# Patient Record
Sex: MALE | Race: WHITE | NOT HISPANIC OR LATINO | Employment: OTHER | URBAN - METROPOLITAN AREA
[De-identification: names, ages, dates, MRNs, and addresses within clinical notes are randomized per-mention and may not be internally consistent; named-entity substitution may affect disease eponyms.]

---

## 2018-01-01 ENCOUNTER — APPOINTMENT (OUTPATIENT)
Dept: ULTRASOUND IMAGING | Facility: HOSPITAL | Age: 83
End: 2018-01-01
Attending: HOSPITALIST

## 2018-01-01 ENCOUNTER — APPOINTMENT (OUTPATIENT)
Dept: GENERAL RADIOLOGY | Facility: HOSPITAL | Age: 83
End: 2018-01-01

## 2018-01-01 ENCOUNTER — APPOINTMENT (OUTPATIENT)
Dept: CARDIOLOGY | Facility: HOSPITAL | Age: 83
End: 2018-01-01

## 2018-01-01 ENCOUNTER — HOSPITAL ENCOUNTER (INPATIENT)
Facility: HOSPITAL | Age: 83
LOS: 9 days | Discharge: NURSING, REHAB OR LONG-TERM CARE | End: 2018-05-10
Attending: EMERGENCY MEDICINE | Admitting: FAMILY MEDICINE

## 2018-01-01 ENCOUNTER — HOSPITAL ENCOUNTER (INPATIENT)
Facility: HOSPITAL | Age: 83
LOS: 2 days | Discharge: HOSPICE | End: 2018-05-28
Attending: EMERGENCY MEDICINE | Admitting: INTERNAL MEDICINE

## 2018-01-01 ENCOUNTER — ANCILLARY PROCEDURE (OUTPATIENT)
Dept: SPEECH THERAPY | Facility: HOSPITAL | Age: 83
End: 2018-01-01
Attending: INTERNAL MEDICINE

## 2018-01-01 ENCOUNTER — HOSPITAL ENCOUNTER (INPATIENT)
Facility: HOSPITAL | Age: 83
LOS: 12 days | End: 2018-06-09
Attending: INTERNAL MEDICINE | Admitting: INTERNAL MEDICINE

## 2018-01-01 ENCOUNTER — APPOINTMENT (OUTPATIENT)
Dept: CT IMAGING | Facility: HOSPITAL | Age: 83
End: 2018-01-01

## 2018-01-01 VITALS
BODY MASS INDEX: 20.54 KG/M2 | DIASTOLIC BLOOD PRESSURE: 67 MMHG | SYSTOLIC BLOOD PRESSURE: 127 MMHG | RESPIRATION RATE: 16 BRPM | HEART RATE: 74 BPM | TEMPERATURE: 98.6 F | WEIGHT: 160.05 LBS | OXYGEN SATURATION: 96 % | HEIGHT: 74 IN

## 2018-01-01 VITALS
DIASTOLIC BLOOD PRESSURE: 51 MMHG | WEIGHT: 154.2 LBS | BODY MASS INDEX: 19.79 KG/M2 | OXYGEN SATURATION: 93 % | RESPIRATION RATE: 16 BRPM | SYSTOLIC BLOOD PRESSURE: 126 MMHG | TEMPERATURE: 97.8 F | HEART RATE: 43 BPM | HEIGHT: 74 IN

## 2018-01-01 VITALS
HEIGHT: 74 IN | SYSTOLIC BLOOD PRESSURE: 108 MMHG | TEMPERATURE: 99.6 F | WEIGHT: 160 LBS | HEART RATE: 78 BPM | DIASTOLIC BLOOD PRESSURE: 62 MMHG | RESPIRATION RATE: 20 BRPM | BODY MASS INDEX: 20.53 KG/M2 | OXYGEN SATURATION: 93 %

## 2018-01-01 DIAGNOSIS — Z78.9 IMPAIRED MOBILITY AND ADLS: ICD-10-CM

## 2018-01-01 DIAGNOSIS — R13.12 OROPHARYNGEAL DYSPHAGIA: ICD-10-CM

## 2018-01-01 DIAGNOSIS — E86.0 DEHYDRATION: Primary | ICD-10-CM

## 2018-01-01 DIAGNOSIS — Z78.9 IMPAIRED MOBILITY AND ACTIVITIES OF DAILY LIVING: ICD-10-CM

## 2018-01-01 DIAGNOSIS — J96.01 ACUTE RESPIRATORY FAILURE WITH HYPOXIA (HCC): ICD-10-CM

## 2018-01-01 DIAGNOSIS — R79.89 ELEVATED SERUM CREATININE: ICD-10-CM

## 2018-01-01 DIAGNOSIS — Z74.09 IMPAIRED MOBILITY AND ACTIVITIES OF DAILY LIVING: ICD-10-CM

## 2018-01-01 DIAGNOSIS — T07.XXXA MULTIPLE CONTUSIONS: ICD-10-CM

## 2018-01-01 DIAGNOSIS — R53.1 GENERALIZED WEAKNESS: ICD-10-CM

## 2018-01-01 DIAGNOSIS — Z74.09 IMPAIRED MOBILITY AND ADLS: ICD-10-CM

## 2018-01-01 DIAGNOSIS — Z74.09 IMPAIRED FUNCTIONAL MOBILITY, BALANCE, GAIT, AND ENDURANCE: ICD-10-CM

## 2018-01-01 DIAGNOSIS — J18.9 PNEUMONIA OF LEFT LOWER LOBE DUE TO INFECTIOUS ORGANISM: Primary | ICD-10-CM

## 2018-01-01 LAB
ALBUMIN SERPL-MCNC: 3 G/DL (ref 3.2–4.8)
ALBUMIN SERPL-MCNC: 3 G/DL (ref 3.2–4.8)
ALBUMIN SERPL-MCNC: 3.5 G/DL (ref 3.2–4.8)
ALBUMIN SERPL-MCNC: 3.9 G/DL (ref 3.2–4.8)
ALBUMIN/GLOB SERPL: 1.1 G/DL (ref 1.5–2.5)
ALBUMIN/GLOB SERPL: 1.2 G/DL (ref 1.5–2.5)
ALP SERPL-CCNC: 104 U/L (ref 25–100)
ALP SERPL-CCNC: 111 U/L (ref 25–100)
ALP SERPL-CCNC: 120 U/L (ref 25–100)
ALP SERPL-CCNC: 171 U/L (ref 25–100)
ALT SERPL W P-5'-P-CCNC: 15 U/L (ref 7–40)
ALT SERPL W P-5'-P-CCNC: 22 U/L (ref 7–40)
ALT SERPL W P-5'-P-CCNC: 23 U/L (ref 7–40)
ALT SERPL W P-5'-P-CCNC: 38 U/L (ref 7–40)
ANION GAP SERPL CALCULATED.3IONS-SCNC: 11 MMOL/L (ref 3–11)
ANION GAP SERPL CALCULATED.3IONS-SCNC: 11 MMOL/L (ref 3–11)
ANION GAP SERPL CALCULATED.3IONS-SCNC: 5 MMOL/L (ref 3–11)
ANION GAP SERPL CALCULATED.3IONS-SCNC: 6 MMOL/L (ref 3–11)
ANION GAP SERPL CALCULATED.3IONS-SCNC: 7 MMOL/L (ref 3–11)
ANION GAP SERPL CALCULATED.3IONS-SCNC: 7 MMOL/L (ref 3–11)
ANION GAP SERPL CALCULATED.3IONS-SCNC: 8 MMOL/L (ref 3–11)
APTT PPP: 35.2 SECONDS (ref 55–70)
APTT PPP: 43.6 SECONDS (ref 55–70)
APTT PPP: 54.2 SECONDS (ref 55–70)
APTT PPP: 62.6 SECONDS (ref 55–70)
APTT PPP: 66.9 SECONDS (ref 55–70)
APTT PPP: 71.7 SECONDS (ref 55–70)
ARTERIAL PATENCY WRIST A: ABNORMAL
ARTICHOKE IGE QN: 75 MG/DL (ref 0–130)
AST SERPL-CCNC: 16 U/L (ref 0–33)
AST SERPL-CCNC: 19 U/L (ref 0–33)
AST SERPL-CCNC: 26 U/L (ref 0–33)
AST SERPL-CCNC: 31 U/L (ref 0–33)
ATMOSPHERIC PRESS: ABNORMAL MMHG
BACTERIA BLD CULT: ABNORMAL
BACTERIA SPEC AEROBE CULT: ABNORMAL
BACTERIA SPEC AEROBE CULT: NORMAL
BACTERIA SPEC RESP CULT: ABNORMAL
BACTERIA SPEC RESP CULT: ABNORMAL
BASE EXCESS BLDA CALC-SCNC: -3 MMOL/L (ref 0–2)
BASOPHILS # BLD AUTO: 0.01 10*3/MM3 (ref 0–0.2)
BASOPHILS # BLD AUTO: 0.02 10*3/MM3 (ref 0–0.2)
BASOPHILS # BLD MANUAL: 0 10*3/MM3 (ref 0–0.2)
BASOPHILS NFR BLD AUTO: 0 % (ref 0–1)
BASOPHILS NFR BLD AUTO: 0.1 % (ref 0–1)
BDY SITE: ABNORMAL
BH CV ECHO MEAS - AO ROOT AREA (BSA CORRECTED): 1.7
BH CV ECHO MEAS - AO ROOT AREA: 9.3 CM^2
BH CV ECHO MEAS - AO ROOT DIAM: 3.4 CM
BH CV ECHO MEAS - BSA(HAYCOCK): 2 M^2
BH CV ECHO MEAS - BSA(HAYCOCK): 2 M^2
BH CV ECHO MEAS - BSA: 2 M^2
BH CV ECHO MEAS - BSA: 2 M^2
BH CV ECHO MEAS - BZI_BMI: 21.1 KILOGRAMS/M^2
BH CV ECHO MEAS - BZI_BMI: 21.1 KILOGRAMS/M^2
BH CV ECHO MEAS - BZI_METRIC_HEIGHT: 188 CM
BH CV ECHO MEAS - BZI_METRIC_HEIGHT: 188 CM
BH CV ECHO MEAS - BZI_METRIC_WEIGHT: 74.4 KG
BH CV ECHO MEAS - BZI_METRIC_WEIGHT: 74.4 KG
BH CV ECHO MEAS - EDV(CUBED): 107 ML
BH CV ECHO MEAS - EDV(TEICH): 104.8 ML
BH CV ECHO MEAS - EF(CUBED): 53.3 %
BH CV ECHO MEAS - EF(TEICH): 45.2 %
BH CV ECHO MEAS - ESV(CUBED): 49.9 ML
BH CV ECHO MEAS - ESV(TEICH): 57.5 ML
BH CV ECHO MEAS - FS: 22.4 %
BH CV ECHO MEAS - IVS/LVPW: 1
BH CV ECHO MEAS - IVSD: 0.94 CM
BH CV ECHO MEAS - LA DIMENSION: 2.1 CM
BH CV ECHO MEAS - LA/AO: 0.61
BH CV ECHO MEAS - LV MASS(C)D: 153.2 GRAMS
BH CV ECHO MEAS - LV MASS(C)DI: 76.7 GRAMS/M^2
BH CV ECHO MEAS - LVIDD: 4.7 CM
BH CV ECHO MEAS - LVIDS: 3.7 CM
BH CV ECHO MEAS - LVPWD: 0.94 CM
BH CV ECHO MEAS - MV A MAX VEL: 121.9 CM/SEC
BH CV ECHO MEAS - MV DEC SLOPE: 414.6 CM/SEC^2
BH CV ECHO MEAS - MV DEC TIME: 0.23 SEC
BH CV ECHO MEAS - MV E MAX VEL: 97.2 CM/SEC
BH CV ECHO MEAS - MV E/A: 0.8
BH CV ECHO MEAS - MV P1/2T MAX VEL: 115.4 CM/SEC
BH CV ECHO MEAS - MV P1/2T: 81.5 MSEC
BH CV ECHO MEAS - MVA P1/2T LCG: 1.9 CM^2
BH CV ECHO MEAS - MVA(P1/2T): 2.7 CM^2
BH CV ECHO MEAS - PA ACC SLOPE: 453.4 CM/SEC^2
BH CV ECHO MEAS - PA ACC TIME: 0.17 SEC
BH CV ECHO MEAS - PA PR(ACCEL): 4.5 MMHG
BH CV ECHO MEAS - RAP SYSTOLE: 3 MMHG
BH CV ECHO MEAS - RV MAX PG: 1.7 MMHG
BH CV ECHO MEAS - RV V1 MAX: 65.2 CM/SEC
BH CV ECHO MEAS - RVSP: 30 MMHG
BH CV ECHO MEAS - SI(CUBED): 28.6 ML/M^2
BH CV ECHO MEAS - SI(TEICH): 23.7 ML/M^2
BH CV ECHO MEAS - SV(CUBED): 57.1 ML
BH CV ECHO MEAS - SV(TEICH): 47.3 ML
BH CV ECHO MEAS - TAPSE (>1.6): 2.1 CM2
BH CV ECHO MEAS - TR MAX VEL: 258 CM/SEC
BH CV LOWER VASCULAR LEFT COMMON FEMORAL AUGMENT: NORMAL
BH CV LOWER VASCULAR LEFT COMMON FEMORAL COMPRESS: NORMAL
BH CV LOWER VASCULAR LEFT COMMON FEMORAL PHASIC: NORMAL
BH CV LOWER VASCULAR LEFT COMMON FEMORAL SPONT: NORMAL
BH CV LOWER VASCULAR LEFT DISTAL FEMORAL COMPRESS: NORMAL
BH CV LOWER VASCULAR LEFT GASTRONEMIUS COMPRESS: NORMAL
BH CV LOWER VASCULAR LEFT GREATER SAPH AK COMPRESS: NORMAL
BH CV LOWER VASCULAR LEFT GREATER SAPH BK COMPRESS: NORMAL
BH CV LOWER VASCULAR LEFT MID FEMORAL AUGMENT: NORMAL
BH CV LOWER VASCULAR LEFT MID FEMORAL COMPRESS: NORMAL
BH CV LOWER VASCULAR LEFT MID FEMORAL PHASIC: NORMAL
BH CV LOWER VASCULAR LEFT MID FEMORAL SPONT: NORMAL
BH CV LOWER VASCULAR LEFT POPLITEAL AUGMENT: NORMAL
BH CV LOWER VASCULAR LEFT POPLITEAL COMPRESS: NORMAL
BH CV LOWER VASCULAR LEFT POPLITEAL PHASIC: NORMAL
BH CV LOWER VASCULAR LEFT POPLITEAL SPONT: NORMAL
BH CV LOWER VASCULAR LEFT POSTERIOR TIBIAL COMPRESS: NORMAL
BH CV LOWER VASCULAR LEFT PROXIMAL FEMORAL COMPRESS: NORMAL
BH CV LOWER VASCULAR LEFT SAPHENOFEMORAL JUNCTION AUGMENT: NORMAL
BH CV LOWER VASCULAR LEFT SAPHENOFEMORAL JUNCTION COMPRESS: NORMAL
BH CV LOWER VASCULAR LEFT SAPHENOFEMORAL JUNCTION PHASIC: NORMAL
BH CV LOWER VASCULAR LEFT SAPHENOFEMORAL JUNCTION SPONT: NORMAL
BH CV LOWER VASCULAR RIGHT COMMON FEMORAL AUGMENT: NORMAL
BH CV LOWER VASCULAR RIGHT COMMON FEMORAL COMPRESS: NORMAL
BH CV LOWER VASCULAR RIGHT COMMON FEMORAL PHASIC: NORMAL
BH CV LOWER VASCULAR RIGHT COMMON FEMORAL SPONT: NORMAL
BH CV LOWER VASCULAR RIGHT DISTAL FEMORAL COMPRESS: NORMAL
BH CV LOWER VASCULAR RIGHT GASTRONEMIUS COMPRESS: NORMAL
BH CV LOWER VASCULAR RIGHT GREATER SAPH BK COMPRESS: NORMAL
BH CV LOWER VASCULAR RIGHT MID FEMORAL AUGMENT: NORMAL
BH CV LOWER VASCULAR RIGHT MID FEMORAL COMPRESS: NORMAL
BH CV LOWER VASCULAR RIGHT MID FEMORAL PHASIC: NORMAL
BH CV LOWER VASCULAR RIGHT MID FEMORAL SPONT: NORMAL
BH CV LOWER VASCULAR RIGHT PERONEAL COMPRESS: NORMAL
BH CV LOWER VASCULAR RIGHT POPLITEAL AUGMENT: NORMAL
BH CV LOWER VASCULAR RIGHT POPLITEAL COMPRESS: NORMAL
BH CV LOWER VASCULAR RIGHT POPLITEAL PHASIC: NORMAL
BH CV LOWER VASCULAR RIGHT POPLITEAL SPONT: NORMAL
BH CV LOWER VASCULAR RIGHT POSTERIOR TIBIAL COMPRESS: NORMAL
BH CV LOWER VASCULAR RIGHT PROXIMAL FEMORAL COMPRESS: NORMAL
BH CV LOWER VASCULAR RIGHT SAPHENOFEMORAL JUNCTION AUGMENT: NORMAL
BH CV LOWER VASCULAR RIGHT SAPHENOFEMORAL JUNCTION COMPRESS: NORMAL
BH CV LOWER VASCULAR RIGHT SAPHENOFEMORAL JUNCTION PHASIC: NORMAL
BH CV LOWER VASCULAR RIGHT SAPHENOFEMORAL JUNCTION SPONT: NORMAL
BH CV XLRA - RV BASE: 3.6 CM
BH CV XLRA - RV LENGTH: 6.5 CM
BH CV XLRA - RV MID: 3.3 CM
BH CV XLRA - TDI S': 11.6 CM/SEC
BILIRUB SERPL-MCNC: 0.6 MG/DL (ref 0.3–1.2)
BILIRUB SERPL-MCNC: 0.7 MG/DL (ref 0.3–1.2)
BILIRUB SERPL-MCNC: 0.8 MG/DL (ref 0.3–1.2)
BILIRUB SERPL-MCNC: 1.1 MG/DL (ref 0.3–1.2)
BILIRUB UR QL STRIP: NEGATIVE
BNP SERPL-MCNC: 1252 PG/ML (ref 0–100)
BNP SERPL-MCNC: 1401 PG/ML (ref 0–100)
BNP SERPL-MCNC: 302 PG/ML (ref 0–100)
BNP SERPL-MCNC: 974 PG/ML (ref 0–100)
BUN BLD-MCNC: 19 MG/DL (ref 9–23)
BUN BLD-MCNC: 19 MG/DL (ref 9–23)
BUN BLD-MCNC: 20 MG/DL (ref 9–23)
BUN BLD-MCNC: 22 MG/DL (ref 9–23)
BUN BLD-MCNC: 28 MG/DL (ref 9–23)
BUN BLD-MCNC: 29 MG/DL (ref 9–23)
BUN BLD-MCNC: 29 MG/DL (ref 9–23)
BUN BLD-MCNC: 35 MG/DL (ref 9–23)
BUN/CREAT SERPL: 15.8 (ref 7–25)
BUN/CREAT SERPL: 16.9 (ref 7–25)
BUN/CREAT SERPL: 17.1 (ref 7–25)
BUN/CREAT SERPL: 17.3 (ref 7–25)
BUN/CREAT SERPL: 18.3 (ref 7–25)
BUN/CREAT SERPL: 18.4 (ref 7–25)
BUN/CREAT SERPL: 18.7 (ref 7–25)
BUN/CREAT SERPL: 20 (ref 7–25)
BUN/CREAT SERPL: 20 (ref 7–25)
BUN/CREAT SERPL: 32.2 (ref 7–25)
CALCIUM SPEC-SCNC: 8.1 MG/DL (ref 8.7–10.4)
CALCIUM SPEC-SCNC: 8.2 MG/DL (ref 8.7–10.4)
CALCIUM SPEC-SCNC: 8.3 MG/DL (ref 8.7–10.4)
CALCIUM SPEC-SCNC: 8.3 MG/DL (ref 8.7–10.4)
CALCIUM SPEC-SCNC: 8.6 MG/DL (ref 8.7–10.4)
CALCIUM SPEC-SCNC: 8.7 MG/DL (ref 8.7–10.4)
CALCIUM SPEC-SCNC: 8.8 MG/DL (ref 8.7–10.4)
CALCIUM SPEC-SCNC: 9.7 MG/DL (ref 8.7–10.4)
CHLORIDE SERPL-SCNC: 106 MMOL/L (ref 99–109)
CHLORIDE SERPL-SCNC: 106 MMOL/L (ref 99–109)
CHLORIDE SERPL-SCNC: 110 MMOL/L (ref 99–109)
CHLORIDE SERPL-SCNC: 110 MMOL/L (ref 99–109)
CHLORIDE SERPL-SCNC: 111 MMOL/L (ref 99–109)
CHLORIDE SERPL-SCNC: 111 MMOL/L (ref 99–109)
CHLORIDE SERPL-SCNC: 112 MMOL/L (ref 99–109)
CHLORIDE SERPL-SCNC: 112 MMOL/L (ref 99–109)
CHLORIDE SERPL-SCNC: 113 MMOL/L (ref 99–109)
CHLORIDE SERPL-SCNC: 114 MMOL/L (ref 99–109)
CHOLEST SERPL-MCNC: 121 MG/DL (ref 0–200)
CLARITY UR: CLEAR
CO2 BLDA-SCNC: 21.9 MMOL/L (ref 22–33)
CO2 SERPL-SCNC: 22 MMOL/L (ref 20–31)
CO2 SERPL-SCNC: 23 MMOL/L (ref 20–31)
CO2 SERPL-SCNC: 23 MMOL/L (ref 20–31)
CO2 SERPL-SCNC: 25 MMOL/L (ref 20–31)
CO2 SERPL-SCNC: 26 MMOL/L (ref 20–31)
CO2 SERPL-SCNC: 27 MMOL/L (ref 20–31)
CO2 SERPL-SCNC: 27 MMOL/L (ref 20–31)
CO2 SERPL-SCNC: 28 MMOL/L (ref 20–31)
COHGB MFR BLD: 1.1 % (ref 0–2)
COLOR UR: YELLOW
CREAT BLD-MCNC: 0.9 MG/DL (ref 0.6–1.3)
CREAT BLD-MCNC: 1 MG/DL (ref 0.6–1.3)
CREAT BLD-MCNC: 1.1 MG/DL (ref 0.6–1.3)
CREAT BLD-MCNC: 1.1 MG/DL (ref 0.6–1.3)
CREAT BLD-MCNC: 1.2 MG/DL (ref 0.6–1.3)
CREAT BLD-MCNC: 1.2 MG/DL (ref 0.6–1.3)
CREAT BLD-MCNC: 1.3 MG/DL (ref 0.6–1.3)
CREAT BLD-MCNC: 1.5 MG/DL (ref 0.6–1.3)
CREAT BLD-MCNC: 1.7 MG/DL (ref 0.6–1.3)
CREAT BLD-MCNC: 1.9 MG/DL (ref 0.6–1.3)
CREAT UR-MCNC: 93.3 MG/DL
D-LACTATE SERPL-SCNC: 1.1 MMOL/L (ref 0.5–2)
D-LACTATE SERPL-SCNC: 2.6 MMOL/L (ref 0.5–2)
D-LACTATE SERPL-SCNC: 2.6 MMOL/L (ref 0.5–2)
DEPRECATED RDW RBC AUTO: 47.8 FL (ref 37–54)
DEPRECATED RDW RBC AUTO: 47.9 FL (ref 37–54)
DEPRECATED RDW RBC AUTO: 48 FL (ref 37–54)
DEPRECATED RDW RBC AUTO: 48.1 FL (ref 37–54)
DEPRECATED RDW RBC AUTO: 48.7 FL (ref 37–54)
DEPRECATED RDW RBC AUTO: 49.4 FL (ref 37–54)
DEPRECATED RDW RBC AUTO: 52.3 FL (ref 37–54)
DEPRECATED RDW RBC AUTO: 53.3 FL (ref 37–54)
DEPRECATED RDW RBC AUTO: 53.6 FL (ref 37–54)
DEPRECATED RDW RBC AUTO: 53.6 FL (ref 37–54)
DIGOXIN SERPL-MCNC: 1.25 NG/ML (ref 0.8–2)
EOSINOPHIL # BLD AUTO: 0 10*3/MM3 (ref 0–0.3)
EOSINOPHIL # BLD AUTO: 0 10*3/MM3 (ref 0–0.3)
EOSINOPHIL # BLD AUTO: 0.01 10*3/MM3 (ref 0–0.3)
EOSINOPHIL # BLD AUTO: 0.02 10*3/MM3 (ref 0–0.3)
EOSINOPHIL # BLD MANUAL: 0 10*3/MM3 (ref 0.1–0.3)
EOSINOPHIL NFR BLD AUTO: 0 % (ref 0–3)
EOSINOPHIL NFR BLD AUTO: 0.3 % (ref 0–3)
EOSINOPHIL NFR BLD MANUAL: 0 % (ref 0–3)
ERYTHROCYTE [DISTWIDTH] IN BLOOD BY AUTOMATED COUNT: 13.5 % (ref 11.3–14.5)
ERYTHROCYTE [DISTWIDTH] IN BLOOD BY AUTOMATED COUNT: 13.6 % (ref 11.3–14.5)
ERYTHROCYTE [DISTWIDTH] IN BLOOD BY AUTOMATED COUNT: 13.7 % (ref 11.3–14.5)
ERYTHROCYTE [DISTWIDTH] IN BLOOD BY AUTOMATED COUNT: 15.2 % (ref 11.3–14.5)
ERYTHROCYTE [DISTWIDTH] IN BLOOD BY AUTOMATED COUNT: 15.2 % (ref 11.3–14.5)
ERYTHROCYTE [DISTWIDTH] IN BLOOD BY AUTOMATED COUNT: 15.3 % (ref 11.3–14.5)
ERYTHROCYTE [DISTWIDTH] IN BLOOD BY AUTOMATED COUNT: 15.4 % (ref 11.3–14.5)
FERRITIN SERPL-MCNC: 590 NG/ML (ref 22–322)
GFR SERPL CREATININE-BSD FRML MDRD: 34 ML/MIN/1.73
GFR SERPL CREATININE-BSD FRML MDRD: 38 ML/MIN/1.73
GFR SERPL CREATININE-BSD FRML MDRD: 44 ML/MIN/1.73
GFR SERPL CREATININE-BSD FRML MDRD: 52 ML/MIN/1.73
GFR SERPL CREATININE-BSD FRML MDRD: 57 ML/MIN/1.73
GFR SERPL CREATININE-BSD FRML MDRD: 57 ML/MIN/1.73
GFR SERPL CREATININE-BSD FRML MDRD: 63 ML/MIN/1.73
GFR SERPL CREATININE-BSD FRML MDRD: 63 ML/MIN/1.73
GFR SERPL CREATININE-BSD FRML MDRD: 71 ML/MIN/1.73
GFR SERPL CREATININE-BSD FRML MDRD: 80 ML/MIN/1.73
GLOBULIN UR ELPH-MCNC: 2.5 GM/DL
GLOBULIN UR ELPH-MCNC: 2.6 GM/DL
GLOBULIN UR ELPH-MCNC: 3 GM/DL
GLOBULIN UR ELPH-MCNC: 3.6 GM/DL
GLUCOSE BLD-MCNC: 100 MG/DL (ref 70–100)
GLUCOSE BLD-MCNC: 101 MG/DL (ref 70–100)
GLUCOSE BLD-MCNC: 102 MG/DL (ref 70–100)
GLUCOSE BLD-MCNC: 105 MG/DL (ref 70–100)
GLUCOSE BLD-MCNC: 129 MG/DL (ref 70–100)
GLUCOSE BLD-MCNC: 140 MG/DL (ref 70–100)
GLUCOSE BLD-MCNC: 85 MG/DL (ref 70–100)
GLUCOSE BLD-MCNC: 87 MG/DL (ref 70–100)
GLUCOSE BLD-MCNC: 93 MG/DL (ref 70–100)
GLUCOSE BLD-MCNC: 96 MG/DL (ref 70–100)
GLUCOSE UR STRIP-MCNC: NEGATIVE MG/DL
GRAM STN SPEC: ABNORMAL
HBA1C MFR BLD: 6 % (ref 4.8–5.6)
HCO3 BLDA-SCNC: 20.9 MMOL/L (ref 20–26)
HCT VFR BLD AUTO: 26.1 % (ref 38.9–50.9)
HCT VFR BLD AUTO: 27 % (ref 38.9–50.9)
HCT VFR BLD AUTO: 28.2 % (ref 38.9–50.9)
HCT VFR BLD AUTO: 28.5 % (ref 38.9–50.9)
HCT VFR BLD AUTO: 28.5 % (ref 38.9–50.9)
HCT VFR BLD AUTO: 29.1 % (ref 38.9–50.9)
HCT VFR BLD AUTO: 29.6 % (ref 38.9–50.9)
HCT VFR BLD AUTO: 30.1 % (ref 38.9–50.9)
HCT VFR BLD AUTO: 30.2 % (ref 38.9–50.9)
HCT VFR BLD AUTO: 37.2 % (ref 38.9–50.9)
HCT VFR BLD CALC: 32 %
HDLC SERPL-MCNC: 33 MG/DL (ref 40–60)
HGB BLD-MCNC: 11.4 G/DL (ref 13.1–17.5)
HGB BLD-MCNC: 8.1 G/DL (ref 13.1–17.5)
HGB BLD-MCNC: 8.4 G/DL (ref 13.1–17.5)
HGB BLD-MCNC: 8.7 G/DL (ref 13.1–17.5)
HGB BLD-MCNC: 8.8 G/DL (ref 13.1–17.5)
HGB BLD-MCNC: 8.8 G/DL (ref 13.1–17.5)
HGB BLD-MCNC: 8.9 G/DL (ref 13.1–17.5)
HGB BLD-MCNC: 9.1 G/DL (ref 13.1–17.5)
HGB BLD-MCNC: 9.2 G/DL (ref 13.1–17.5)
HGB BLD-MCNC: 9.3 G/DL (ref 13.1–17.5)
HGB BLDA-MCNC: 10.4 G/DL (ref 13.5–17.5)
HGB UR QL STRIP.AUTO: NEGATIVE
HOLD SPECIMEN: NORMAL
HOLD SPECIMEN: NORMAL
HOROWITZ INDEX BLD+IHG-RTO: 80 %
IMM GRANULOCYTES # BLD: 0.02 10*3/MM3 (ref 0–0.03)
IMM GRANULOCYTES # BLD: 0.14 10*3/MM3 (ref 0–0.03)
IMM GRANULOCYTES # BLD: 0.16 10*3/MM3 (ref 0–0.03)
IMM GRANULOCYTES # BLD: 0.18 10*3/MM3 (ref 0–0.03)
IMM GRANULOCYTES NFR BLD: 0.3 % (ref 0–0.6)
IMM GRANULOCYTES NFR BLD: 0.7 % (ref 0–0.6)
IMM GRANULOCYTES NFR BLD: 0.8 % (ref 0–0.6)
IMM GRANULOCYTES NFR BLD: 1.3 % (ref 0–0.6)
INR PPP: 1.31 (ref 0.91–1.09)
IRON 24H UR-MRATE: 10 MCG/DL (ref 50–175)
IRON 24H UR-MRATE: 7 MCG/DL (ref 50–175)
IRON SATN MFR SERPL: 4 % (ref 20–50)
IRON SATN MFR SERPL: 4 % (ref 20–50)
ISOLATED FROM: ABNORMAL
KETONES UR QL STRIP: NEGATIVE
LEFT ATRIUM VOLUME INDEX: 25 ML/M2
LEFT ATRIUM VOLUME: 50 CM3
LEUKOCYTE ESTERASE UR QL STRIP.AUTO: NEGATIVE
LYMPHOCYTES # BLD AUTO: 0.28 10*3/MM3 (ref 0.6–4.8)
LYMPHOCYTES # BLD AUTO: 0.51 10*3/MM3 (ref 0.6–4.8)
LYMPHOCYTES # BLD AUTO: 0.54 10*3/MM3 (ref 0.6–4.8)
LYMPHOCYTES # BLD AUTO: 1.33 10*3/MM3 (ref 0.6–4.8)
LYMPHOCYTES # BLD MANUAL: 0.32 10*3/MM3 (ref 0.6–4.8)
LYMPHOCYTES NFR BLD AUTO: 1.5 % (ref 24–44)
LYMPHOCYTES NFR BLD AUTO: 3.7 % (ref 24–44)
LYMPHOCYTES NFR BLD AUTO: 6.3 % (ref 24–44)
LYMPHOCYTES NFR BLD AUTO: 7 % (ref 24–44)
LYMPHOCYTES NFR BLD MANUAL: 2 % (ref 24–44)
LYMPHOCYTES NFR BLD MANUAL: 4 % (ref 0–12)
MAGNESIUM SERPL-MCNC: 1.7 MG/DL (ref 1.3–2.7)
MAGNESIUM SERPL-MCNC: 2 MG/DL (ref 1.3–2.7)
MAGNESIUM SERPL-MCNC: 2.1 MG/DL (ref 1.3–2.7)
MAGNESIUM SERPL-MCNC: 2.2 MG/DL (ref 1.3–2.7)
MAGNESIUM SERPL-MCNC: 2.3 MG/DL (ref 1.3–2.7)
MAXIMAL PREDICTED HEART RATE: 134 BPM
MCH RBC QN AUTO: 29.5 PG (ref 27–31)
MCH RBC QN AUTO: 29.6 PG (ref 27–31)
MCH RBC QN AUTO: 29.7 PG (ref 27–31)
MCH RBC QN AUTO: 29.8 PG (ref 27–31)
MCH RBC QN AUTO: 29.8 PG (ref 27–31)
MCH RBC QN AUTO: 29.9 PG (ref 27–31)
MCH RBC QN AUTO: 30 PG (ref 27–31)
MCH RBC QN AUTO: 30.2 PG (ref 27–31)
MCHC RBC AUTO-ENTMCNC: 30.2 G/DL (ref 32–36)
MCHC RBC AUTO-ENTMCNC: 30.6 G/DL (ref 32–36)
MCHC RBC AUTO-ENTMCNC: 30.6 G/DL (ref 32–36)
MCHC RBC AUTO-ENTMCNC: 30.8 G/DL (ref 32–36)
MCHC RBC AUTO-ENTMCNC: 30.9 G/DL (ref 32–36)
MCHC RBC AUTO-ENTMCNC: 31 G/DL (ref 32–36)
MCHC RBC AUTO-ENTMCNC: 31.1 G/DL (ref 32–36)
MCHC RBC AUTO-ENTMCNC: 31.1 G/DL (ref 32–36)
MCV RBC AUTO: 94.9 FL (ref 80–99)
MCV RBC AUTO: 95.6 FL (ref 80–99)
MCV RBC AUTO: 95.6 FL (ref 80–99)
MCV RBC AUTO: 96.2 FL (ref 80–99)
MCV RBC AUTO: 96.4 FL (ref 80–99)
MCV RBC AUTO: 96.4 FL (ref 80–99)
MCV RBC AUTO: 96.9 FL (ref 80–99)
MCV RBC AUTO: 97.1 FL (ref 80–99)
MCV RBC AUTO: 97.9 FL (ref 80–99)
MCV RBC AUTO: 98.7 FL (ref 80–99)
METAMYELOCYTES NFR BLD MANUAL: 1 % (ref 0–0)
METHGB BLD QL: 1.3 % (ref 0–1.5)
MODALITY: ABNORMAL
MONOCYTES # BLD AUTO: 0.65 10*3/MM3 (ref 0–1)
MONOCYTES # BLD AUTO: 0.66 10*3/MM3 (ref 0–1)
MONOCYTES # BLD AUTO: 0.84 10*3/MM3 (ref 0–1)
MONOCYTES # BLD AUTO: 1.01 10*3/MM3 (ref 0–1)
MONOCYTES # BLD AUTO: 1.24 10*3/MM3 (ref 0–1)
MONOCYTES NFR BLD AUTO: 16 % (ref 0–12)
MONOCYTES NFR BLD AUTO: 4.4 % (ref 0–12)
MONOCYTES NFR BLD AUTO: 4.8 % (ref 0–12)
MONOCYTES NFR BLD AUTO: 4.8 % (ref 0–12)
NEUTROPHILS # BLD AUTO: 12.61 10*3/MM3 (ref 1.5–8.3)
NEUTROPHILS # BLD AUTO: 15 10*3/MM3 (ref 1.5–8.3)
NEUTROPHILS # BLD AUTO: 17.84 10*3/MM3 (ref 1.5–8.3)
NEUTROPHILS # BLD AUTO: 18.87 10*3/MM3 (ref 1.5–8.3)
NEUTROPHILS # BLD AUTO: 5.92 10*3/MM3 (ref 1.5–8.3)
NEUTROPHILS NFR BLD AUTO: 76.6 % (ref 41–71)
NEUTROPHILS NFR BLD AUTO: 88.8 % (ref 41–71)
NEUTROPHILS NFR BLD AUTO: 91.4 % (ref 41–71)
NEUTROPHILS NFR BLD AUTO: 94 % (ref 41–71)
NEUTROPHILS NFR BLD MANUAL: 74 % (ref 41–71)
NEUTS BAND NFR BLD MANUAL: 19 % (ref 0–5)
NITRITE UR QL STRIP: NEGATIVE
OXYHGB MFR BLDV: 93.3 % (ref 94–99)
PCO2 BLDA: 32.8 MM HG (ref 35–48)
PH BLDA: 7.41 PH UNITS (ref 7.35–7.45)
PH UR STRIP.AUTO: <=5 [PH] (ref 5–8)
PLAT MORPH BLD: NORMAL
PLAT MORPH BLD: NORMAL
PLATELET # BLD AUTO: 149 10*3/MM3 (ref 150–450)
PLATELET # BLD AUTO: 158 10*3/MM3 (ref 150–450)
PLATELET # BLD AUTO: 167 10*3/MM3 (ref 150–450)
PLATELET # BLD AUTO: 175 10*3/MM3 (ref 150–450)
PLATELET # BLD AUTO: 214 10*3/MM3 (ref 150–450)
PLATELET # BLD AUTO: 221 10*3/MM3 (ref 150–450)
PLATELET # BLD AUTO: 256 10*3/MM3 (ref 150–450)
PLATELET # BLD AUTO: 305 10*3/MM3 (ref 150–450)
PLATELET # BLD AUTO: 338 10*3/MM3 (ref 150–450)
PLATELET # BLD AUTO: 341 10*3/MM3 (ref 150–450)
PMV BLD AUTO: 10 FL (ref 6–12)
PMV BLD AUTO: 10 FL (ref 6–12)
PMV BLD AUTO: 10.2 FL (ref 6–12)
PMV BLD AUTO: 10.3 FL (ref 6–12)
PMV BLD AUTO: 10.4 FL (ref 6–12)
PMV BLD AUTO: 10.4 FL (ref 6–12)
PMV BLD AUTO: 10.5 FL (ref 6–12)
PMV BLD AUTO: 10.6 FL (ref 6–12)
PMV BLD AUTO: 10.7 FL (ref 6–12)
PMV BLD AUTO: 10.8 FL (ref 6–12)
PO2 BLDA: 80.5 MM HG (ref 83–108)
POTASSIUM BLD-SCNC: 3.4 MMOL/L (ref 3.5–5.5)
POTASSIUM BLD-SCNC: 3.7 MMOL/L (ref 3.5–5.5)
POTASSIUM BLD-SCNC: 3.8 MMOL/L (ref 3.5–5.5)
POTASSIUM BLD-SCNC: 4 MMOL/L (ref 3.5–5.5)
PROCALCITONIN SERPL-MCNC: 2.39 NG/ML
PROT SERPL-MCNC: 5.5 G/DL (ref 5.7–8.2)
PROT SERPL-MCNC: 5.6 G/DL (ref 5.7–8.2)
PROT SERPL-MCNC: 6.5 G/DL (ref 5.7–8.2)
PROT SERPL-MCNC: 7.5 G/DL (ref 5.7–8.2)
PROT UR QL STRIP: ABNORMAL
PROT UR-MCNC: 23 MG/DL (ref 1–14)
PROTHROMBIN TIME: 13.8 SECONDS (ref 9.6–11.5)
RBC # BLD AUTO: 2.73 10*6/MM3 (ref 4.2–5.76)
RBC # BLD AUTO: 2.8 10*6/MM3 (ref 4.2–5.76)
RBC # BLD AUTO: 2.91 10*6/MM3 (ref 4.2–5.76)
RBC # BLD AUTO: 2.91 10*6/MM3 (ref 4.2–5.76)
RBC # BLD AUTO: 2.98 10*6/MM3 (ref 4.2–5.76)
RBC # BLD AUTO: 3.02 10*6/MM3 (ref 4.2–5.76)
RBC # BLD AUTO: 3.05 10*6/MM3 (ref 4.2–5.76)
RBC # BLD AUTO: 3.12 10*6/MM3 (ref 4.2–5.76)
RBC # BLD AUTO: 3.14 10*6/MM3 (ref 4.2–5.76)
RBC # BLD AUTO: 3.83 10*6/MM3 (ref 4.2–5.76)
RBC MORPH BLD: NORMAL
RBC MORPH BLD: NORMAL
SODIUM BLD-SCNC: 139 MMOL/L (ref 132–146)
SODIUM BLD-SCNC: 140 MMOL/L (ref 132–146)
SODIUM BLD-SCNC: 142 MMOL/L (ref 132–146)
SODIUM BLD-SCNC: 143 MMOL/L (ref 132–146)
SODIUM BLD-SCNC: 144 MMOL/L (ref 132–146)
SODIUM BLD-SCNC: 145 MMOL/L (ref 132–146)
SODIUM BLD-SCNC: 145 MMOL/L (ref 132–146)
SODIUM BLD-SCNC: 146 MMOL/L (ref 132–146)
SODIUM UR-SCNC: 61 MMOL/L (ref 30–90)
SP GR UR STRIP: 1.02 (ref 1–1.03)
STRESS TARGET HR: 114 BPM
TIBC SERPL-MCNC: 176 MCG/DL (ref 250–450)
TIBC SERPL-MCNC: 228 MCG/DL (ref 250–450)
TRIGL SERPL-MCNC: 72 MG/DL (ref 0–150)
TROPONIN I SERPL-MCNC: 0.01 NG/ML (ref 0–0.07)
TROPONIN I SERPL-MCNC: 0.04 NG/ML
TROPONIN I SERPL-MCNC: 0.05 NG/ML
TROPONIN I SERPL-MCNC: 0.06 NG/ML
TROPONIN I SERPL-MCNC: 0.09 NG/ML (ref 0–0.07)
TROPONIN I SERPL-MCNC: 0.57 NG/ML (ref 0–0.07)
TROPONIN I SERPL-MCNC: 1.96 NG/ML
TROPONIN I SERPL-MCNC: 2.21 NG/ML
TSH SERPL DL<=0.05 MIU/L-ACNC: 1.03 MIU/ML (ref 0.35–5.35)
UROBILINOGEN UR QL STRIP: ABNORMAL
VANCOMYCIN SERPL-MCNC: 16.4 MCG/ML (ref 5–40)
VIT B12 BLD-MCNC: 1387 PG/ML (ref 211–911)
VIT B12 BLD-MCNC: 931 PG/ML (ref 211–911)
WBC MORPH BLD: NORMAL
WBC MORPH BLD: NORMAL
WBC NRBC COR # BLD: 13.79 10*3/MM3 (ref 3.5–10.8)
WBC NRBC COR # BLD: 16.13 10*3/MM3 (ref 3.5–10.8)
WBC NRBC COR # BLD: 18.97 10*3/MM3 (ref 3.5–10.8)
WBC NRBC COR # BLD: 21.24 10*3/MM3 (ref 3.5–10.8)
WBC NRBC COR # BLD: 6.5 10*3/MM3 (ref 3.5–10.8)
WBC NRBC COR # BLD: 6.59 10*3/MM3 (ref 3.5–10.8)
WBC NRBC COR # BLD: 7.56 10*3/MM3 (ref 3.5–10.8)
WBC NRBC COR # BLD: 7.71 10*3/MM3 (ref 3.5–10.8)
WBC NRBC COR # BLD: 7.73 10*3/MM3 (ref 3.5–10.8)
WBC NRBC COR # BLD: 7.99 10*3/MM3 (ref 3.5–10.8)
WHOLE BLOOD HOLD SPECIMEN: NORMAL
WHOLE BLOOD HOLD SPECIMEN: NORMAL

## 2018-01-01 PROCEDURE — 25010000002 HEPARIN (PORCINE) PER 1000 UNITS

## 2018-01-01 PROCEDURE — 73060 X-RAY EXAM OF HUMERUS: CPT

## 2018-01-01 PROCEDURE — 25010000002 PIPERACILLIN SOD-TAZOBACTAM PER 1 G: Performed by: FAMILY MEDICINE

## 2018-01-01 PROCEDURE — 63710000001 PREDNISONE PER 1 MG: Performed by: FAMILY MEDICINE

## 2018-01-01 PROCEDURE — 82570 ASSAY OF URINE CREATININE: CPT | Performed by: HOSPITALIST

## 2018-01-01 PROCEDURE — 25010000002 HEPARIN (PORCINE) PER 1000 UNITS: Performed by: PHYSICIAN ASSISTANT

## 2018-01-01 PROCEDURE — A9270 NON-COVERED ITEM OR SERVICE: HCPCS | Performed by: FAMILY MEDICINE

## 2018-01-01 PROCEDURE — 93005 ELECTROCARDIOGRAM TRACING: CPT | Performed by: EMERGENCY MEDICINE

## 2018-01-01 PROCEDURE — 85730 THROMBOPLASTIN TIME PARTIAL: CPT | Performed by: INTERNAL MEDICINE

## 2018-01-01 PROCEDURE — 94760 N-INVAS EAR/PLS OXIMETRY 1: CPT

## 2018-01-01 PROCEDURE — 99285 EMERGENCY DEPT VISIT HI MDM: CPT

## 2018-01-01 PROCEDURE — 25010000002 LORAZEPAM PER 2 MG: Performed by: NURSE PRACTITIONER

## 2018-01-01 PROCEDURE — 25010000002 MORPHINE SULFATE (PF) 2 MG/ML SOLUTION: Performed by: INTERNAL MEDICINE

## 2018-01-01 PROCEDURE — 97110 THERAPEUTIC EXERCISES: CPT

## 2018-01-01 PROCEDURE — 84300 ASSAY OF URINE SODIUM: CPT | Performed by: HOSPITALIST

## 2018-01-01 PROCEDURE — 25010000002 DIGOXIN PER 500 MCG: Performed by: PHYSICIAN ASSISTANT

## 2018-01-01 PROCEDURE — 99222 1ST HOSP IP/OBS MODERATE 55: CPT | Performed by: PHYSICIAN ASSISTANT

## 2018-01-01 PROCEDURE — 87077 CULTURE AEROBIC IDENTIFY: CPT | Performed by: EMERGENCY MEDICINE

## 2018-01-01 PROCEDURE — 99233 SBSQ HOSP IP/OBS HIGH 50: CPT | Performed by: PHYSICIAN ASSISTANT

## 2018-01-01 PROCEDURE — 85027 COMPLETE CBC AUTOMATED: CPT | Performed by: NURSE PRACTITIONER

## 2018-01-01 PROCEDURE — 70450 CT HEAD/BRAIN W/O DYE: CPT

## 2018-01-01 PROCEDURE — 83880 ASSAY OF NATRIURETIC PEPTIDE: CPT | Performed by: EMERGENCY MEDICINE

## 2018-01-01 PROCEDURE — 93010 ELECTROCARDIOGRAM REPORT: CPT | Performed by: INTERNAL MEDICINE

## 2018-01-01 PROCEDURE — 25010000002 LEVOFLOXACIN PER 250 MG: Performed by: EMERGENCY MEDICINE

## 2018-01-01 PROCEDURE — 63710000001 ASPIRIN EC 325 MG TABLET DELAYED-RELEASE: Performed by: FAMILY MEDICINE

## 2018-01-01 PROCEDURE — 93005 ELECTROCARDIOGRAM TRACING: CPT | Performed by: FAMILY MEDICINE

## 2018-01-01 PROCEDURE — 29584 APPL MLTLAY CMPRN SYS UP ARM: CPT

## 2018-01-01 PROCEDURE — 99232 SBSQ HOSP IP/OBS MODERATE 35: CPT | Performed by: PHYSICIAN ASSISTANT

## 2018-01-01 PROCEDURE — 82728 ASSAY OF FERRITIN: CPT | Performed by: PHYSICIAN ASSISTANT

## 2018-01-01 PROCEDURE — 94640 AIRWAY INHALATION TREATMENT: CPT

## 2018-01-01 PROCEDURE — 99284 EMERGENCY DEPT VISIT MOD MDM: CPT

## 2018-01-01 PROCEDURE — 25010000002 MORPHINE PER 10 MG: Performed by: NURSE PRACTITIONER

## 2018-01-01 PROCEDURE — 85007 BL SMEAR W/DIFF WBC COUNT: CPT | Performed by: FAMILY MEDICINE

## 2018-01-01 PROCEDURE — 81003 URINALYSIS AUTO W/O SCOPE: CPT | Performed by: PHYSICIAN ASSISTANT

## 2018-01-01 PROCEDURE — 71045 X-RAY EXAM CHEST 1 VIEW: CPT

## 2018-01-01 PROCEDURE — 99223 1ST HOSP IP/OBS HIGH 75: CPT | Performed by: FAMILY MEDICINE

## 2018-01-01 PROCEDURE — 25010000002 DEXAMETHASONE PER 1 MG: Performed by: NURSE PRACTITIONER

## 2018-01-01 PROCEDURE — 99239 HOSP IP/OBS DSCHRG MGMT >30: CPT | Performed by: NURSE PRACTITIONER

## 2018-01-01 PROCEDURE — 99232 SBSQ HOSP IP/OBS MODERATE 35: CPT | Performed by: NURSE PRACTITIONER

## 2018-01-01 PROCEDURE — 82607 VITAMIN B-12: CPT | Performed by: INTERNAL MEDICINE

## 2018-01-01 PROCEDURE — 82805 BLOOD GASES W/O2 SATURATION: CPT | Performed by: EMERGENCY MEDICINE

## 2018-01-01 PROCEDURE — 85025 COMPLETE CBC W/AUTO DIFF WBC: CPT | Performed by: FAMILY MEDICINE

## 2018-01-01 PROCEDURE — 83735 ASSAY OF MAGNESIUM: CPT | Performed by: FAMILY MEDICINE

## 2018-01-01 PROCEDURE — 84156 ASSAY OF PROTEIN URINE: CPT | Performed by: HOSPITALIST

## 2018-01-01 PROCEDURE — 93970 EXTREMITY STUDY: CPT

## 2018-01-01 PROCEDURE — 25010000002 VANCOMYCIN 10 G RECONSTITUTED SOLUTION: Performed by: EMERGENCY MEDICINE

## 2018-01-01 PROCEDURE — 25010000002 HEPARIN (PORCINE) PER 1000 UNITS: Performed by: FAMILY MEDICINE

## 2018-01-01 PROCEDURE — 97530 THERAPEUTIC ACTIVITIES: CPT

## 2018-01-01 PROCEDURE — 83735 ASSAY OF MAGNESIUM: CPT | Performed by: INTERNAL MEDICINE

## 2018-01-01 PROCEDURE — 25010000002 PIPERACILLIN-TAZOBACTAM: Performed by: EMERGENCY MEDICINE

## 2018-01-01 PROCEDURE — 25010000002 MORPHINE SULFATE (PF) 2 MG/ML SOLUTION: Performed by: NURSE PRACTITIONER

## 2018-01-01 PROCEDURE — 99239 HOSP IP/OBS DSCHRG MGMT >30: CPT | Performed by: INTERNAL MEDICINE

## 2018-01-01 PROCEDURE — 87040 BLOOD CULTURE FOR BACTERIA: CPT | Performed by: EMERGENCY MEDICINE

## 2018-01-01 PROCEDURE — 73590 X-RAY EXAM OF LOWER LEG: CPT

## 2018-01-01 PROCEDURE — 99222 1ST HOSP IP/OBS MODERATE 55: CPT | Performed by: INTERNAL MEDICINE

## 2018-01-01 PROCEDURE — 25010000002 LORAZEPAM PER 2 MG: Performed by: INTERNAL MEDICINE

## 2018-01-01 PROCEDURE — 83735 ASSAY OF MAGNESIUM: CPT | Performed by: HOSPITALIST

## 2018-01-01 PROCEDURE — 94799 UNLISTED PULMONARY SVC/PX: CPT

## 2018-01-01 PROCEDURE — 99291 CRITICAL CARE FIRST HOUR: CPT | Performed by: INTERNAL MEDICINE

## 2018-01-01 PROCEDURE — 83605 ASSAY OF LACTIC ACID: CPT | Performed by: FAMILY MEDICINE

## 2018-01-01 PROCEDURE — 83605 ASSAY OF LACTIC ACID: CPT | Performed by: EMERGENCY MEDICINE

## 2018-01-01 PROCEDURE — 92610 EVALUATE SWALLOWING FUNCTION: CPT

## 2018-01-01 PROCEDURE — 85027 COMPLETE CBC AUTOMATED: CPT | Performed by: HOSPITALIST

## 2018-01-01 PROCEDURE — 25010000002 FUROSEMIDE PER 20 MG: Performed by: INTERNAL MEDICINE

## 2018-01-01 PROCEDURE — 80202 ASSAY OF VANCOMYCIN: CPT | Performed by: FAMILY MEDICINE

## 2018-01-01 PROCEDURE — 87186 SC STD MICRODIL/AGAR DIL: CPT | Performed by: EMERGENCY MEDICINE

## 2018-01-01 PROCEDURE — 36600 WITHDRAWAL OF ARTERIAL BLOOD: CPT | Performed by: EMERGENCY MEDICINE

## 2018-01-01 PROCEDURE — 87150 DNA/RNA AMPLIFIED PROBE: CPT | Performed by: EMERGENCY MEDICINE

## 2018-01-01 PROCEDURE — 25010000002 MAGNESIUM SULFATE 2 GM/50ML SOLUTION

## 2018-01-01 PROCEDURE — 82607 VITAMIN B-12: CPT | Performed by: FAMILY MEDICINE

## 2018-01-01 PROCEDURE — 80048 BASIC METABOLIC PNL TOTAL CA: CPT

## 2018-01-01 PROCEDURE — 80048 BASIC METABOLIC PNL TOTAL CA: CPT | Performed by: PHYSICIAN ASSISTANT

## 2018-01-01 PROCEDURE — 25010000002 DIGOXIN PER 500 MCG: Performed by: FAMILY MEDICINE

## 2018-01-01 PROCEDURE — 25010000002 METHYLPREDNISOLONE PER 40 MG: Performed by: INTERNAL MEDICINE

## 2018-01-01 PROCEDURE — 85730 THROMBOPLASTIN TIME PARTIAL: CPT

## 2018-01-01 PROCEDURE — 99233 SBSQ HOSP IP/OBS HIGH 50: CPT | Performed by: NURSE PRACTITIONER

## 2018-01-01 PROCEDURE — 85730 THROMBOPLASTIN TIME PARTIAL: CPT | Performed by: FAMILY MEDICINE

## 2018-01-01 PROCEDURE — 83540 ASSAY OF IRON: CPT | Performed by: PHYSICIAN ASSISTANT

## 2018-01-01 PROCEDURE — 80053 COMPREHEN METABOLIC PANEL: CPT | Performed by: HOSPITALIST

## 2018-01-01 PROCEDURE — 85007 BL SMEAR W/DIFF WBC COUNT: CPT | Performed by: INTERNAL MEDICINE

## 2018-01-01 PROCEDURE — 93306 TTE W/DOPPLER COMPLETE: CPT

## 2018-01-01 PROCEDURE — 83550 IRON BINDING TEST: CPT | Performed by: INTERNAL MEDICINE

## 2018-01-01 PROCEDURE — 80048 BASIC METABOLIC PNL TOTAL CA: CPT | Performed by: NURSE PRACTITIONER

## 2018-01-01 PROCEDURE — 84484 ASSAY OF TROPONIN QUANT: CPT | Performed by: FAMILY MEDICINE

## 2018-01-01 PROCEDURE — 25010000002 VANCOMYCIN PER 500 MG

## 2018-01-01 PROCEDURE — 97166 OT EVAL MOD COMPLEX 45 MIN: CPT

## 2018-01-01 PROCEDURE — 80061 LIPID PANEL: CPT | Performed by: HOSPITALIST

## 2018-01-01 PROCEDURE — 80053 COMPREHEN METABOLIC PANEL: CPT | Performed by: PHYSICIAN ASSISTANT

## 2018-01-01 PROCEDURE — 80162 ASSAY OF DIGOXIN TOTAL: CPT | Performed by: FAMILY MEDICINE

## 2018-01-01 PROCEDURE — 93005 ELECTROCARDIOGRAM TRACING: CPT | Performed by: PHYSICIAN ASSISTANT

## 2018-01-01 PROCEDURE — 85025 COMPLETE CBC W/AUTO DIFF WBC: CPT | Performed by: EMERGENCY MEDICINE

## 2018-01-01 PROCEDURE — 87070 CULTURE OTHR SPECIMN AEROBIC: CPT | Performed by: PHYSICIAN ASSISTANT

## 2018-01-01 PROCEDURE — 84484 ASSAY OF TROPONIN QUANT: CPT | Performed by: PHYSICIAN ASSISTANT

## 2018-01-01 PROCEDURE — 85025 COMPLETE CBC W/AUTO DIFF WBC: CPT | Performed by: PHYSICIAN ASSISTANT

## 2018-01-01 PROCEDURE — 73560 X-RAY EXAM OF KNEE 1 OR 2: CPT

## 2018-01-01 PROCEDURE — 84484 ASSAY OF TROPONIN QUANT: CPT

## 2018-01-01 PROCEDURE — 97116 GAIT TRAINING THERAPY: CPT

## 2018-01-01 PROCEDURE — 83550 IRON BINDING TEST: CPT | Performed by: PHYSICIAN ASSISTANT

## 2018-01-01 PROCEDURE — 97164 PT RE-EVAL EST PLAN CARE: CPT

## 2018-01-01 PROCEDURE — 83540 ASSAY OF IRON: CPT | Performed by: INTERNAL MEDICINE

## 2018-01-01 PROCEDURE — 99233 SBSQ HOSP IP/OBS HIGH 50: CPT | Performed by: FAMILY MEDICINE

## 2018-01-01 PROCEDURE — 80048 BASIC METABOLIC PNL TOTAL CA: CPT | Performed by: FAMILY MEDICINE

## 2018-01-01 PROCEDURE — 84443 ASSAY THYROID STIM HORMONE: CPT | Performed by: PHYSICIAN ASSISTANT

## 2018-01-01 PROCEDURE — 99232 SBSQ HOSP IP/OBS MODERATE 35: CPT | Performed by: FAMILY MEDICINE

## 2018-01-01 PROCEDURE — 85027 COMPLETE CBC AUTOMATED: CPT | Performed by: PHYSICIAN ASSISTANT

## 2018-01-01 PROCEDURE — 83880 ASSAY OF NATRIURETIC PEPTIDE: CPT | Performed by: PHYSICIAN ASSISTANT

## 2018-01-01 PROCEDURE — 83735 ASSAY OF MAGNESIUM: CPT | Performed by: PHYSICIAN ASSISTANT

## 2018-01-01 PROCEDURE — 92612 ENDOSCOPY SWALLOW (FEES) VID: CPT

## 2018-01-01 PROCEDURE — 85025 COMPLETE CBC W/AUTO DIFF WBC: CPT | Performed by: INTERNAL MEDICINE

## 2018-01-01 PROCEDURE — 76775 US EXAM ABDO BACK WALL LIM: CPT

## 2018-01-01 PROCEDURE — 84145 PROCALCITONIN (PCT): CPT | Performed by: EMERGENCY MEDICINE

## 2018-01-01 PROCEDURE — 80053 COMPREHEN METABOLIC PANEL: CPT | Performed by: EMERGENCY MEDICINE

## 2018-01-01 PROCEDURE — 72125 CT NECK SPINE W/O DYE: CPT

## 2018-01-01 PROCEDURE — 87147 CULTURE TYPE IMMUNOLOGIC: CPT | Performed by: EMERGENCY MEDICINE

## 2018-01-01 PROCEDURE — 97162 PT EVAL MOD COMPLEX 30 MIN: CPT

## 2018-01-01 PROCEDURE — 87205 SMEAR GRAM STAIN: CPT | Performed by: PHYSICIAN ASSISTANT

## 2018-01-01 PROCEDURE — 99233 SBSQ HOSP IP/OBS HIGH 50: CPT | Performed by: HOSPITALIST

## 2018-01-01 PROCEDURE — 83880 ASSAY OF NATRIURETIC PEPTIDE: CPT | Performed by: INTERNAL MEDICINE

## 2018-01-01 PROCEDURE — 85610 PROTHROMBIN TIME: CPT | Performed by: FAMILY MEDICINE

## 2018-01-01 PROCEDURE — 83036 HEMOGLOBIN GLYCOSYLATED A1C: CPT | Performed by: PHYSICIAN ASSISTANT

## 2018-01-01 RX ORDER — METOPROLOL SUCCINATE 25 MG/1
12.5 TABLET, EXTENDED RELEASE ORAL DAILY
COMMUNITY
End: 2018-01-01 | Stop reason: HOSPADM

## 2018-01-01 RX ORDER — IPRATROPIUM BROMIDE AND ALBUTEROL SULFATE 2.5; .5 MG/3ML; MG/3ML
3 SOLUTION RESPIRATORY (INHALATION) EVERY 4 HOURS PRN
COMMUNITY

## 2018-01-01 RX ORDER — LORAZEPAM 2 MG/ML
0.25 INJECTION INTRAMUSCULAR
Status: DISCONTINUED | OUTPATIENT
Start: 2018-01-01 | End: 2018-01-01

## 2018-01-01 RX ORDER — KETOROLAC TROMETHAMINE 30 MG/ML
15 INJECTION, SOLUTION INTRAMUSCULAR; INTRAVENOUS EVERY 6 HOURS PRN
Status: DISCONTINUED | OUTPATIENT
Start: 2018-01-01 | End: 2018-01-01 | Stop reason: HOSPADM

## 2018-01-01 RX ORDER — SENNA AND DOCUSATE SODIUM 50; 8.6 MG/1; MG/1
2 TABLET, FILM COATED ORAL 2 TIMES DAILY
Status: DISCONTINUED | OUTPATIENT
Start: 2018-01-01 | End: 2018-01-01

## 2018-01-01 RX ORDER — SODIUM CHLORIDE 0.9 % (FLUSH) 0.9 %
10 SYRINGE (ML) INJECTION AS NEEDED
Status: DISCONTINUED | OUTPATIENT
Start: 2018-01-01 | End: 2018-01-01 | Stop reason: HOSPADM

## 2018-01-01 RX ORDER — LORAZEPAM 2 MG/ML
0.5 INJECTION INTRAMUSCULAR EVERY 4 HOURS PRN
Status: CANCELLED | OUTPATIENT
Start: 2018-01-01 | End: 2018-01-01

## 2018-01-01 RX ORDER — DIGOXIN 0.25 MG/ML
250 INJECTION INTRAMUSCULAR; INTRAVENOUS ONCE
Status: COMPLETED | OUTPATIENT
Start: 2018-01-01 | End: 2018-01-01

## 2018-01-01 RX ORDER — PANTOPRAZOLE SODIUM 40 MG/1
40 TABLET, DELAYED RELEASE ORAL NIGHTLY
Status: DISCONTINUED | OUTPATIENT
Start: 2018-01-01 | End: 2018-01-01

## 2018-01-01 RX ORDER — LEVOFLOXACIN 5 MG/ML
750 INJECTION, SOLUTION INTRAVENOUS ONCE
Status: COMPLETED | OUTPATIENT
Start: 2018-01-01 | End: 2018-01-01

## 2018-01-01 RX ORDER — MORPHINE SULFATE 2 MG/ML
1 INJECTION, SOLUTION INTRAMUSCULAR; INTRAVENOUS EVERY 6 HOURS
Status: DISCONTINUED | OUTPATIENT
Start: 2018-01-01 | End: 2018-01-01 | Stop reason: HOSPADM

## 2018-01-01 RX ORDER — DOCUSATE SODIUM 100 MG/1
100 CAPSULE, LIQUID FILLED ORAL 2 TIMES DAILY PRN
Status: DISCONTINUED | OUTPATIENT
Start: 2018-01-01 | End: 2018-01-01

## 2018-01-01 RX ORDER — HYDROCODONE BITARTRATE AND ACETAMINOPHEN 5; 325 MG/1; MG/1
1 TABLET ORAL EVERY 6 HOURS PRN
Status: DISCONTINUED | OUTPATIENT
Start: 2018-01-01 | End: 2018-01-01

## 2018-01-01 RX ORDER — POTASSIUM CHLORIDE 7.45 MG/ML
10 INJECTION INTRAVENOUS
Status: DISCONTINUED | OUTPATIENT
Start: 2018-01-01 | End: 2018-01-01

## 2018-01-01 RX ORDER — MORPHINE SULFATE 2 MG/ML
2 INJECTION, SOLUTION INTRAMUSCULAR; INTRAVENOUS
Status: DISCONTINUED | OUTPATIENT
Start: 2018-01-01 | End: 2018-01-01 | Stop reason: HOSPADM

## 2018-01-01 RX ORDER — MINERAL OIL AND WHITE PETROLATUM 150; 830 MG/G; MG/G
OINTMENT OPHTHALMIC 2 TIMES DAILY
Status: DISCONTINUED | OUTPATIENT
Start: 2018-01-01 | End: 2018-01-01 | Stop reason: HOSPADM

## 2018-01-01 RX ORDER — IPRATROPIUM BROMIDE AND ALBUTEROL SULFATE 2.5; .5 MG/3ML; MG/3ML
3 SOLUTION RESPIRATORY (INHALATION)
Status: CANCELLED | OUTPATIENT
Start: 2018-01-01

## 2018-01-01 RX ORDER — SODIUM CHLORIDE 0.9 % (FLUSH) 0.9 %
1-10 SYRINGE (ML) INJECTION AS NEEDED
Status: DISCONTINUED | OUTPATIENT
Start: 2018-01-01 | End: 2018-01-01 | Stop reason: HOSPADM

## 2018-01-01 RX ORDER — SODIUM CHLORIDE FOR INHALATION 3 %
4 VIAL, NEBULIZER (ML) INHALATION
Status: DISCONTINUED | OUTPATIENT
Start: 2018-01-01 | End: 2018-01-01

## 2018-01-01 RX ORDER — MORPHINE SULFATE 1 MG/ML
INJECTION INTRAVENOUS CONTINUOUS
Status: DISCONTINUED | OUTPATIENT
Start: 2018-01-01 | End: 2018-01-01

## 2018-01-01 RX ORDER — HEPARIN SODIUM 5000 [USP'U]/ML
5000 INJECTION, SOLUTION INTRAVENOUS; SUBCUTANEOUS EVERY 8 HOURS SCHEDULED
Status: DISCONTINUED | OUTPATIENT
Start: 2018-01-01 | End: 2018-01-01 | Stop reason: HOSPADM

## 2018-01-01 RX ORDER — MORPHINE SULFATE 2 MG/ML
1 INJECTION, SOLUTION INTRAMUSCULAR; INTRAVENOUS EVERY 6 HOURS
Status: CANCELLED | OUTPATIENT
Start: 2018-01-01

## 2018-01-01 RX ORDER — MORPHINE SULFATE 2 MG/ML
1 INJECTION, SOLUTION INTRAMUSCULAR; INTRAVENOUS
Status: CANCELLED | OUTPATIENT
Start: 2018-01-01

## 2018-01-01 RX ORDER — DIGOXIN 0.25 MG/ML
125 INJECTION INTRAMUSCULAR; INTRAVENOUS
Status: DISCONTINUED | OUTPATIENT
Start: 2018-01-01 | End: 2018-01-01 | Stop reason: HOSPADM

## 2018-01-01 RX ORDER — IPRATROPIUM BROMIDE AND ALBUTEROL SULFATE 2.5; .5 MG/3ML; MG/3ML
3 SOLUTION RESPIRATORY (INHALATION) EVERY 4 HOURS PRN
Status: DISCONTINUED | OUTPATIENT
Start: 2018-01-01 | End: 2018-01-01 | Stop reason: HOSPADM

## 2018-01-01 RX ORDER — VANCOMYCIN HYDROCHLORIDE 1 G/200ML
1000 INJECTION, SOLUTION INTRAVENOUS EVERY 24 HOURS
Status: DISCONTINUED | OUTPATIENT
Start: 2018-01-01 | End: 2018-01-01

## 2018-01-01 RX ORDER — DIGOXIN 0.25 MG/ML
125 INJECTION INTRAMUSCULAR; INTRAVENOUS
Status: CANCELLED | OUTPATIENT
Start: 2018-01-01

## 2018-01-01 RX ORDER — LEVOFLOXACIN 750 MG/1
750 TABLET ORAL DAILY
COMMUNITY
Start: 2018-01-01 | End: 2018-01-01

## 2018-01-01 RX ORDER — DEXTROMETHORPHAN POLISTIREX 30 MG/5ML
10 SUSPENSION ORAL 2 TIMES DAILY PRN
COMMUNITY

## 2018-01-01 RX ORDER — GLYCOPYRROLATE 0.2 MG/ML
0.4 INJECTION INTRAMUSCULAR; INTRAVENOUS EVERY 4 HOURS PRN
Status: DISCONTINUED | OUTPATIENT
Start: 2018-01-01 | End: 2018-01-01 | Stop reason: HOSPADM

## 2018-01-01 RX ORDER — LORAZEPAM 2 MG/ML
0.5 INJECTION INTRAMUSCULAR
Status: DISCONTINUED | OUTPATIENT
Start: 2018-01-01 | End: 2018-01-01

## 2018-01-01 RX ORDER — POTASSIUM CHLORIDE 1.5 G/1.77G
40 POWDER, FOR SOLUTION ORAL AS NEEDED
Status: DISCONTINUED | OUTPATIENT
Start: 2018-01-01 | End: 2018-01-01

## 2018-01-01 RX ORDER — POLYVINYL ALCOHOL 14 MG/ML
1 SOLUTION/ DROPS OPHTHALMIC
Status: DISCONTINUED | OUTPATIENT
Start: 2018-01-01 | End: 2018-01-01 | Stop reason: HOSPADM

## 2018-01-01 RX ORDER — SODIUM CHLORIDE 9 MG/ML
100 INJECTION, SOLUTION INTRAVENOUS CONTINUOUS
Status: DISCONTINUED | OUTPATIENT
Start: 2018-01-01 | End: 2018-01-01

## 2018-01-01 RX ORDER — DEXAMETHASONE SODIUM PHOSPHATE 4 MG/ML
4 INJECTION, SOLUTION INTRA-ARTICULAR; INTRALESIONAL; INTRAMUSCULAR; INTRAVENOUS; SOFT TISSUE 2 TIMES DAILY
Status: DISCONTINUED | OUTPATIENT
Start: 2018-01-01 | End: 2018-01-01

## 2018-01-01 RX ORDER — PANTOPRAZOLE SODIUM 40 MG/10ML
40 INJECTION, POWDER, LYOPHILIZED, FOR SOLUTION INTRAVENOUS
Status: DISCONTINUED | OUTPATIENT
Start: 2018-01-01 | End: 2018-01-01

## 2018-01-01 RX ORDER — TAMSULOSIN HYDROCHLORIDE 0.4 MG/1
0.4 CAPSULE ORAL NIGHTLY
COMMUNITY

## 2018-01-01 RX ORDER — MORPHINE SULFATE 1 MG/ML
INJECTION INTRAVENOUS CONTINUOUS
Status: DISCONTINUED | OUTPATIENT
Start: 2018-01-01 | End: 2018-01-01 | Stop reason: HOSPADM

## 2018-01-01 RX ORDER — DILTIAZEM HYDROCHLORIDE 5 MG/ML
10 INJECTION INTRAVENOUS ONCE
Status: DISCONTINUED | OUTPATIENT
Start: 2018-01-01 | End: 2018-01-01 | Stop reason: CLARIF

## 2018-01-01 RX ORDER — MORPHINE SULFATE 2 MG/ML
1 INJECTION, SOLUTION INTRAMUSCULAR; INTRAVENOUS
Status: DISCONTINUED | OUTPATIENT
Start: 2018-01-01 | End: 2018-01-01 | Stop reason: HOSPADM

## 2018-01-01 RX ORDER — POTASSIUM CHLORIDE 750 MG/1
40 CAPSULE, EXTENDED RELEASE ORAL ONCE
Status: COMPLETED | OUTPATIENT
Start: 2018-01-01 | End: 2018-01-01

## 2018-01-01 RX ORDER — TRAMADOL HYDROCHLORIDE 50 MG/1
50 TABLET ORAL EVERY 6 HOURS PRN
Qty: 12 TABLET | Refills: 0
Start: 2018-01-01 | End: 2018-01-01

## 2018-01-01 RX ORDER — MORPHINE SULFATE 2 MG/ML
1 INJECTION, SOLUTION INTRAMUSCULAR; INTRAVENOUS EVERY 6 HOURS
Status: DISCONTINUED | OUTPATIENT
Start: 2018-01-01 | End: 2018-01-01

## 2018-01-01 RX ORDER — SODIUM CHLORIDE FOR INHALATION 3 %
4 VIAL, NEBULIZER (ML) INHALATION EVERY 4 HOURS PRN
Status: DISCONTINUED | OUTPATIENT
Start: 2018-01-01 | End: 2018-01-01 | Stop reason: HOSPADM

## 2018-01-01 RX ORDER — METHYLPREDNISOLONE SODIUM SUCCINATE 40 MG/ML
20 INJECTION, POWDER, LYOPHILIZED, FOR SOLUTION INTRAMUSCULAR; INTRAVENOUS EVERY 12 HOURS
Status: DISCONTINUED | OUTPATIENT
Start: 2018-01-01 | End: 2018-01-01 | Stop reason: HOSPADM

## 2018-01-01 RX ORDER — MAGNESIUM SULFATE HEPTAHYDRATE 40 MG/ML
2 INJECTION, SOLUTION INTRAVENOUS AS NEEDED
Status: DISCONTINUED | OUTPATIENT
Start: 2018-01-01 | End: 2018-01-01

## 2018-01-01 RX ORDER — MORPHINE SULFATE 2 MG/ML
2 INJECTION, SOLUTION INTRAMUSCULAR; INTRAVENOUS EVERY 4 HOURS
Status: DISCONTINUED | OUTPATIENT
Start: 2018-01-01 | End: 2018-01-01

## 2018-01-01 RX ORDER — DIGOXIN 0.25 MG/ML
250 INJECTION INTRAMUSCULAR; INTRAVENOUS
Status: COMPLETED | OUTPATIENT
Start: 2018-01-01 | End: 2018-01-01

## 2018-01-01 RX ORDER — SODIUM CHLORIDE 0.9 % (FLUSH) 0.9 %
1-10 SYRINGE (ML) INJECTION AS NEEDED
Status: CANCELLED | OUTPATIENT
Start: 2018-01-01

## 2018-01-01 RX ORDER — FUROSEMIDE 40 MG/1
40 TABLET ORAL DAILY
COMMUNITY
End: 2018-01-01 | Stop reason: HOSPADM

## 2018-01-01 RX ORDER — PREDNISONE 20 MG/1
20 TABLET ORAL DAILY
Status: COMPLETED | OUTPATIENT
Start: 2018-01-01 | End: 2018-01-01

## 2018-01-01 RX ORDER — TAMSULOSIN HYDROCHLORIDE 0.4 MG/1
0.4 CAPSULE ORAL NIGHTLY
Status: DISCONTINUED | OUTPATIENT
Start: 2018-01-01 | End: 2018-01-01 | Stop reason: HOSPADM

## 2018-01-01 RX ORDER — KETOROLAC TROMETHAMINE 30 MG/ML
15 INJECTION, SOLUTION INTRAMUSCULAR; INTRAVENOUS EVERY 6 HOURS PRN
Status: ACTIVE | OUTPATIENT
Start: 2018-01-01 | End: 2018-01-01

## 2018-01-01 RX ORDER — ASPIRIN 81 MG/1
81 TABLET ORAL DAILY
Status: DISCONTINUED | OUTPATIENT
Start: 2018-01-01 | End: 2018-01-01 | Stop reason: HOSPADM

## 2018-01-01 RX ORDER — BISACODYL 5 MG/1
10 TABLET, DELAYED RELEASE ORAL DAILY PRN
Status: DISCONTINUED | OUTPATIENT
Start: 2018-01-01 | End: 2018-01-01 | Stop reason: HOSPADM

## 2018-01-01 RX ORDER — IPRATROPIUM BROMIDE AND ALBUTEROL SULFATE 2.5; .5 MG/3ML; MG/3ML
3 SOLUTION RESPIRATORY (INHALATION)
Status: DISCONTINUED | OUTPATIENT
Start: 2018-01-01 | End: 2018-01-01

## 2018-01-01 RX ORDER — BISACODYL 10 MG
10 SUPPOSITORY, RECTAL RECTAL DAILY PRN
Status: DISCONTINUED | OUTPATIENT
Start: 2018-01-01 | End: 2018-01-01 | Stop reason: HOSPADM

## 2018-01-01 RX ORDER — HEPARIN SODIUM 1000 [USP'U]/ML
30 INJECTION, SOLUTION INTRAVENOUS; SUBCUTANEOUS AS NEEDED
Status: DISCONTINUED | OUTPATIENT
Start: 2018-01-01 | End: 2018-01-01 | Stop reason: SDUPTHER

## 2018-01-01 RX ORDER — MAGNESIUM SULFATE HEPTAHYDRATE 40 MG/ML
2 INJECTION, SOLUTION INTRAVENOUS ONCE
Status: COMPLETED | OUTPATIENT
Start: 2018-01-01 | End: 2018-01-01

## 2018-01-01 RX ORDER — MAGNESIUM SULFATE HEPTAHYDRATE 40 MG/ML
4 INJECTION, SOLUTION INTRAVENOUS AS NEEDED
Status: DISCONTINUED | OUTPATIENT
Start: 2018-01-01 | End: 2018-01-01

## 2018-01-01 RX ORDER — BISACODYL 10 MG
10 SUPPOSITORY, RECTAL RECTAL DAILY
Status: DISCONTINUED | OUTPATIENT
Start: 2018-01-01 | End: 2018-01-01

## 2018-01-01 RX ORDER — MORPHINE SULFATE 2 MG/ML
2 INJECTION, SOLUTION INTRAMUSCULAR; INTRAVENOUS
Status: DISCONTINUED | OUTPATIENT
Start: 2018-01-01 | End: 2018-01-01

## 2018-01-01 RX ORDER — MAGNESIUM CARB/ALUMINUM HYDROX 105-160MG
296 TABLET,CHEWABLE ORAL ONCE
Status: DISCONTINUED | OUTPATIENT
Start: 2018-01-01 | End: 2018-01-01 | Stop reason: HOSPADM

## 2018-01-01 RX ORDER — ASPIRIN 325 MG
325 TABLET, DELAYED RELEASE (ENTERIC COATED) ORAL DAILY
Status: DISCONTINUED | OUTPATIENT
Start: 2018-01-01 | End: 2018-01-01

## 2018-01-01 RX ORDER — SENNA AND DOCUSATE SODIUM 50; 8.6 MG/1; MG/1
2 TABLET, FILM COATED ORAL 2 TIMES DAILY
Status: DISCONTINUED | OUTPATIENT
Start: 2018-01-01 | End: 2018-01-01 | Stop reason: HOSPADM

## 2018-01-01 RX ORDER — DOCUSATE SODIUM 100 MG/1
100 CAPSULE, LIQUID FILLED ORAL 2 TIMES DAILY
Status: DISCONTINUED | OUTPATIENT
Start: 2018-01-01 | End: 2018-01-01

## 2018-01-01 RX ORDER — POTASSIUM CHLORIDE 750 MG/1
40 CAPSULE, EXTENDED RELEASE ORAL AS NEEDED
Status: DISCONTINUED | OUTPATIENT
Start: 2018-01-01 | End: 2018-01-01

## 2018-01-01 RX ORDER — GLYCOPYRROLATE 0.2 MG/ML
0.1 INJECTION INTRAMUSCULAR; INTRAVENOUS EVERY 4 HOURS PRN
Status: DISCONTINUED | OUTPATIENT
Start: 2018-01-01 | End: 2018-01-01

## 2018-01-01 RX ORDER — MORPHINE SULFATE 2 MG/ML
2 INJECTION, SOLUTION INTRAMUSCULAR; INTRAVENOUS
Status: CANCELLED | OUTPATIENT
Start: 2018-01-01

## 2018-01-01 RX ORDER — GUAIFENESIN 600 MG/1
600 TABLET, EXTENDED RELEASE ORAL 2 TIMES DAILY
COMMUNITY

## 2018-01-01 RX ORDER — SENNA AND DOCUSATE SODIUM 50; 8.6 MG/1; MG/1
2 TABLET, FILM COATED ORAL 2 TIMES DAILY
Qty: 60 TABLET | Refills: 0
Start: 2018-01-01

## 2018-01-01 RX ORDER — ACETAMINOPHEN 325 MG/1
650 TABLET ORAL EVERY 6 HOURS PRN
Status: DISCONTINUED | OUTPATIENT
Start: 2018-01-01 | End: 2018-01-01 | Stop reason: HOSPADM

## 2018-01-01 RX ORDER — SODIUM CHLORIDE FOR INHALATION 3 %
4 VIAL, NEBULIZER (ML) INHALATION ONCE
Status: COMPLETED | OUTPATIENT
Start: 2018-01-01 | End: 2018-01-01

## 2018-01-01 RX ORDER — BISACODYL 10 MG
10 SUPPOSITORY, RECTAL RECTAL ONCE
Status: DISCONTINUED | OUTPATIENT
Start: 2018-01-01 | End: 2018-01-01 | Stop reason: HOSPADM

## 2018-01-01 RX ORDER — HEPARIN SODIUM 1000 [USP'U]/ML
60 INJECTION, SOLUTION INTRAVENOUS; SUBCUTANEOUS AS NEEDED
Status: DISCONTINUED | OUTPATIENT
Start: 2018-01-01 | End: 2018-01-01 | Stop reason: SDUPTHER

## 2018-01-01 RX ORDER — HYDROCODONE BITARTRATE AND ACETAMINOPHEN 5; 325 MG/1; MG/1
1 TABLET ORAL EVERY 4 HOURS PRN
Status: DISCONTINUED | OUTPATIENT
Start: 2018-01-01 | End: 2018-01-01

## 2018-01-01 RX ORDER — DIGOXIN 0.25 MG/ML
250 INJECTION INTRAMUSCULAR; INTRAVENOUS EVERY 4 HOURS
Status: COMPLETED | OUTPATIENT
Start: 2018-01-01 | End: 2018-01-01

## 2018-01-01 RX ORDER — HEPARIN SODIUM 5000 [USP'U]/.5ML
5000 INJECTION, SOLUTION INTRAVENOUS; SUBCUTANEOUS EVERY 8 HOURS SCHEDULED
COMMUNITY
Start: 2018-01-01 | End: 2018-01-01

## 2018-01-01 RX ORDER — ACETAMINOPHEN 325 MG/1
650 TABLET ORAL EVERY 4 HOURS PRN
Status: DISCONTINUED | OUTPATIENT
Start: 2018-01-01 | End: 2018-01-01

## 2018-01-01 RX ORDER — LORAZEPAM 2 MG/ML
0.5 INJECTION INTRAMUSCULAR EVERY 4 HOURS PRN
Status: DISCONTINUED | OUTPATIENT
Start: 2018-01-01 | End: 2018-01-01

## 2018-01-01 RX ORDER — TRAMADOL HYDROCHLORIDE 50 MG/1
25 TABLET ORAL EVERY 8 HOURS PRN
COMMUNITY

## 2018-01-01 RX ORDER — DIGOXIN 0.25 MG/ML
125 INJECTION INTRAMUSCULAR; INTRAVENOUS
Status: DISCONTINUED | OUTPATIENT
Start: 2018-01-01 | End: 2018-01-01 | Stop reason: SDUPTHER

## 2018-01-01 RX ORDER — POLYVINYL ALCOHOL 14 MG/ML
2 SOLUTION/ DROPS OPHTHALMIC 3 TIMES DAILY
Status: DISCONTINUED | OUTPATIENT
Start: 2018-01-01 | End: 2018-01-01

## 2018-01-01 RX ORDER — MORPHINE SULFATE 2 MG/ML
1 INJECTION, SOLUTION INTRAMUSCULAR; INTRAVENOUS
Status: DISCONTINUED | OUTPATIENT
Start: 2018-01-01 | End: 2018-01-01

## 2018-01-01 RX ORDER — ASPIRIN 325 MG
325 TABLET, DELAYED RELEASE (ENTERIC COATED) ORAL DAILY
COMMUNITY

## 2018-01-01 RX ORDER — LORAZEPAM 2 MG/ML
1 INJECTION INTRAMUSCULAR
Status: DISCONTINUED | OUTPATIENT
Start: 2018-01-01 | End: 2018-01-01 | Stop reason: HOSPADM

## 2018-01-01 RX ORDER — MAGNESIUM SULFATE 1 G/100ML
1 INJECTION INTRAVENOUS AS NEEDED
Status: DISCONTINUED | OUTPATIENT
Start: 2018-01-01 | End: 2018-01-01

## 2018-01-01 RX ORDER — PANTOPRAZOLE SODIUM 40 MG/1
40 TABLET, DELAYED RELEASE ORAL NIGHTLY
Qty: 30 TABLET | Refills: 0
Start: 2018-01-01

## 2018-01-01 RX ORDER — GLYCOPYRROLATE 0.2 MG/ML
0.2 INJECTION INTRAMUSCULAR; INTRAVENOUS EVERY 4 HOURS PRN
Status: DISCONTINUED | OUTPATIENT
Start: 2018-01-01 | End: 2018-01-01

## 2018-01-01 RX ORDER — NALOXONE HCL 0.4 MG/ML
0.1 VIAL (ML) INJECTION
Status: DISCONTINUED | OUTPATIENT
Start: 2018-01-01 | End: 2018-01-01

## 2018-01-01 RX ORDER — IPRATROPIUM BROMIDE AND ALBUTEROL SULFATE 2.5; .5 MG/3ML; MG/3ML
3 SOLUTION RESPIRATORY (INHALATION)
Status: DISCONTINUED | OUTPATIENT
Start: 2018-01-01 | End: 2018-01-01 | Stop reason: HOSPADM

## 2018-01-01 RX ORDER — HEPARIN SODIUM 5000 [USP'U]/.5ML
5000 INJECTION, SOLUTION INTRAVENOUS; SUBCUTANEOUS EVERY 12 HOURS SCHEDULED
Status: DISCONTINUED | OUTPATIENT
Start: 2018-01-01 | End: 2018-01-01

## 2018-01-01 RX ORDER — SODIUM CHLORIDE 9 MG/ML
75 INJECTION, SOLUTION INTRAVENOUS CONTINUOUS
Status: ACTIVE | OUTPATIENT
Start: 2018-01-01 | End: 2018-01-01

## 2018-01-01 RX ORDER — FUROSEMIDE 10 MG/ML
40 INJECTION INTRAMUSCULAR; INTRAVENOUS ONCE
Status: COMPLETED | OUTPATIENT
Start: 2018-01-01 | End: 2018-01-01

## 2018-01-01 RX ORDER — LORAZEPAM 2 MG/ML
0.5 INJECTION INTRAMUSCULAR EVERY 4 HOURS PRN
Status: DISCONTINUED | OUTPATIENT
Start: 2018-01-01 | End: 2018-01-01 | Stop reason: HOSPADM

## 2018-01-01 RX ORDER — BISACODYL 5 MG/1
10 TABLET, DELAYED RELEASE ORAL ONCE
Status: COMPLETED | OUTPATIENT
Start: 2018-01-01 | End: 2018-01-01

## 2018-01-01 RX ORDER — LORAZEPAM 2 MG/ML
0.5 INJECTION INTRAMUSCULAR EVERY 6 HOURS
Status: DISCONTINUED | OUTPATIENT
Start: 2018-01-01 | End: 2018-01-01 | Stop reason: HOSPADM

## 2018-01-01 RX ORDER — LORAZEPAM 2 MG/ML
0.25 INJECTION INTRAMUSCULAR NIGHTLY
Status: DISCONTINUED | OUTPATIENT
Start: 2018-01-01 | End: 2018-01-01

## 2018-01-01 RX ORDER — HEPARIN SODIUM 1000 [USP'U]/ML
2000 INJECTION, SOLUTION INTRAVENOUS; SUBCUTANEOUS ONCE
Status: COMPLETED | OUTPATIENT
Start: 2018-01-01 | End: 2018-01-01

## 2018-01-01 RX ORDER — PANTOPRAZOLE SODIUM 40 MG/10ML
40 INJECTION, POWDER, LYOPHILIZED, FOR SOLUTION INTRAVENOUS
Status: CANCELLED | OUTPATIENT
Start: 2018-01-01

## 2018-01-01 RX ORDER — PANTOPRAZOLE SODIUM 40 MG/10ML
40 INJECTION, POWDER, LYOPHILIZED, FOR SOLUTION INTRAVENOUS
Status: DISCONTINUED | OUTPATIENT
Start: 2018-01-01 | End: 2018-01-01 | Stop reason: HOSPADM

## 2018-01-01 RX ORDER — PANTOPRAZOLE SODIUM 40 MG/1
40 TABLET, DELAYED RELEASE ORAL NIGHTLY
Status: DISCONTINUED | OUTPATIENT
Start: 2018-01-01 | End: 2018-01-01 | Stop reason: HOSPADM

## 2018-01-01 RX ORDER — ONDANSETRON 4 MG/1
4 TABLET, FILM COATED ORAL EVERY 6 HOURS PRN
Status: DISCONTINUED | OUTPATIENT
Start: 2018-01-01 | End: 2018-01-01

## 2018-01-01 RX ORDER — METHYLPREDNISOLONE SODIUM SUCCINATE 40 MG/ML
20 INJECTION, POWDER, LYOPHILIZED, FOR SOLUTION INTRAMUSCULAR; INTRAVENOUS EVERY 12 HOURS
Status: CANCELLED | OUTPATIENT
Start: 2018-01-01

## 2018-01-01 RX ORDER — SODIUM CHLORIDE 0.9 % (FLUSH) 0.9 %
10 SYRINGE (ML) INJECTION AS NEEDED
Status: CANCELLED | OUTPATIENT
Start: 2018-01-01

## 2018-01-01 RX ORDER — IPRATROPIUM BROMIDE AND ALBUTEROL SULFATE 2.5; .5 MG/3ML; MG/3ML
3 SOLUTION RESPIRATORY (INHALATION) EVERY 4 HOURS PRN
Status: CANCELLED | OUTPATIENT
Start: 2018-01-01

## 2018-01-01 RX ORDER — FUROSEMIDE 10 MG/ML
20 INJECTION INTRAMUSCULAR; INTRAVENOUS ONCE
Status: DISCONTINUED | OUTPATIENT
Start: 2018-01-01 | End: 2018-01-01

## 2018-01-01 RX ORDER — POTASSIUM CHLORIDE 750 MG/1
10 TABLET, EXTENDED RELEASE ORAL DAILY
COMMUNITY
End: 2018-01-01 | Stop reason: HOSPADM

## 2018-01-01 RX ORDER — LORAZEPAM 2 MG/ML
0.25 INJECTION INTRAMUSCULAR EVERY 8 HOURS
Status: DISCONTINUED | OUTPATIENT
Start: 2018-01-01 | End: 2018-01-01

## 2018-01-01 RX ORDER — TAMSULOSIN HYDROCHLORIDE 0.4 MG/1
0.4 CAPSULE ORAL NIGHTLY
Status: DISCONTINUED | OUTPATIENT
Start: 2018-01-01 | End: 2018-01-01

## 2018-01-01 RX ORDER — SODIUM CHLORIDE 9 MG/ML
75 INJECTION, SOLUTION INTRAVENOUS CONTINUOUS
Status: DISCONTINUED | OUTPATIENT
Start: 2018-01-01 | End: 2018-01-01

## 2018-01-01 RX ORDER — SODIUM CHLORIDE 9 MG/ML
50 INJECTION, SOLUTION INTRAVENOUS CONTINUOUS
Status: ACTIVE | OUTPATIENT
Start: 2018-01-01 | End: 2018-01-01

## 2018-01-01 RX ORDER — TRAMADOL HYDROCHLORIDE 50 MG/1
50 TABLET ORAL EVERY 6 HOURS PRN
Status: DISCONTINUED | OUTPATIENT
Start: 2018-01-01 | End: 2018-01-01 | Stop reason: HOSPADM

## 2018-01-01 RX ORDER — MORPHINE SULFATE 2 MG/ML
1 INJECTION, SOLUTION INTRAMUSCULAR; INTRAVENOUS EVERY 4 HOURS PRN
Status: DISCONTINUED | OUTPATIENT
Start: 2018-01-01 | End: 2018-01-01

## 2018-01-01 RX ORDER — METHYLPREDNISOLONE SODIUM SUCCINATE 40 MG/ML
20 INJECTION, POWDER, LYOPHILIZED, FOR SOLUTION INTRAMUSCULAR; INTRAVENOUS EVERY 12 HOURS
Status: DISCONTINUED | OUTPATIENT
Start: 2018-01-01 | End: 2018-01-01

## 2018-01-01 RX ADMIN — MORPHINE SULFATE 1 MG: 10 INJECTION INTRAVENOUS at 05:15

## 2018-01-01 RX ADMIN — ASPIRIN 325 MG: 325 TABLET, DELAYED RELEASE ORAL at 09:36

## 2018-01-01 RX ADMIN — HYDROGEN PEROXIDE: 3 LIQUID TOPICAL at 20:34

## 2018-01-01 RX ADMIN — METHYLPREDNISOLONE SODIUM SUCCINATE 20 MG: 40 INJECTION, POWDER, FOR SOLUTION INTRAMUSCULAR; INTRAVENOUS at 11:20

## 2018-01-01 RX ADMIN — MORPHINE SULFATE: 1 INJECTION INTRAVENOUS at 20:34

## 2018-01-01 RX ADMIN — MORPHINE SULFATE 2 MG: 10 INJECTION INTRAVENOUS at 07:41

## 2018-01-01 RX ADMIN — METOPROLOL TARTRATE 25 MG: 25 TABLET ORAL at 20:54

## 2018-01-01 RX ADMIN — PANTOPRAZOLE SODIUM 40 MG: 40 TABLET, DELAYED RELEASE ORAL at 20:26

## 2018-01-01 RX ADMIN — NYSTATIN 500000 UNITS: 100000 SUSPENSION ORAL at 12:31

## 2018-01-01 RX ADMIN — PANTOPRAZOLE SODIUM 40 MG: 40 TABLET, DELAYED RELEASE ORAL at 21:25

## 2018-01-01 RX ADMIN — HEPARIN SODIUM 5000 UNITS: 5000 INJECTION, SOLUTION INTRAVENOUS; SUBCUTANEOUS at 05:13

## 2018-01-01 RX ADMIN — DICLOFENAC SODIUM 2 G: 10 GEL TOPICAL at 23:11

## 2018-01-01 RX ADMIN — METOPROLOL TARTRATE 25 MG: 25 TABLET ORAL at 22:08

## 2018-01-01 RX ADMIN — LORAZEPAM 0.5 MG: 2 INJECTION INTRAMUSCULAR; INTRAVENOUS at 05:38

## 2018-01-01 RX ADMIN — DICLOFENAC SODIUM 2 G: 10 GEL TOPICAL at 12:59

## 2018-01-01 RX ADMIN — ASPIRIN 325 MG: 325 TABLET, DELAYED RELEASE ORAL at 10:28

## 2018-01-01 RX ADMIN — HYDROGEN PEROXIDE: 3 LIQUID TOPICAL at 09:19

## 2018-01-01 RX ADMIN — LORAZEPAM 0.5 MG: 2 INJECTION INTRAMUSCULAR; INTRAVENOUS at 12:37

## 2018-01-01 RX ADMIN — HEPARIN SODIUM 5000 UNITS: 5000 INJECTION, SOLUTION INTRAVENOUS; SUBCUTANEOUS at 05:22

## 2018-01-01 RX ADMIN — POLYVINYL ALCOHOL 2 DROP: 14 SOLUTION/ DROPS OPHTHALMIC at 09:43

## 2018-01-01 RX ADMIN — HEPARIN SODIUM 5000 UNITS: 5000 INJECTION, SOLUTION INTRAVENOUS; SUBCUTANEOUS at 15:43

## 2018-01-01 RX ADMIN — LORAZEPAM 0.5 MG: 2 INJECTION INTRAMUSCULAR; INTRAVENOUS at 13:01

## 2018-01-01 RX ADMIN — MORPHINE SULFATE 2 MG: 2 INJECTION, SOLUTION INTRAMUSCULAR; INTRAVENOUS at 15:33

## 2018-01-01 RX ADMIN — POLYETHYLENE GLYCOL (3350) 17 G: 17 POWDER, FOR SOLUTION ORAL at 09:31

## 2018-01-01 RX ADMIN — HEPARIN SODIUM 5000 UNITS: 5000 INJECTION, SOLUTION INTRAVENOUS; SUBCUTANEOUS at 21:03

## 2018-01-01 RX ADMIN — SODIUM CHLORIDE 75 ML/HR: 9 INJECTION, SOLUTION INTRAVENOUS at 21:43

## 2018-01-01 RX ADMIN — LORAZEPAM 0.5 MG: 2 INJECTION INTRAMUSCULAR; INTRAVENOUS at 18:01

## 2018-01-01 RX ADMIN — HEPARIN SODIUM 5000 UNITS: 5000 INJECTION, SOLUTION INTRAVENOUS; SUBCUTANEOUS at 06:05

## 2018-01-01 RX ADMIN — LORAZEPAM 0.5 MG: 2 INJECTION INTRAMUSCULAR; INTRAVENOUS at 11:18

## 2018-01-01 RX ADMIN — Medication 10 ML: at 15:40

## 2018-01-01 RX ADMIN — DICLOFENAC SODIUM 2 G: 10 GEL TOPICAL at 15:43

## 2018-01-01 RX ADMIN — LORAZEPAM 0.5 MG: 2 INJECTION INTRAMUSCULAR; INTRAVENOUS at 00:12

## 2018-01-01 RX ADMIN — NYSTATIN 500000 UNITS: 100000 SUSPENSION ORAL at 13:44

## 2018-01-01 RX ADMIN — LORAZEPAM 0.5 MG: 2 INJECTION INTRAMUSCULAR; INTRAVENOUS at 17:02

## 2018-01-01 RX ADMIN — BISACODYL 10 MG: 10 SUPPOSITORY RECTAL at 09:06

## 2018-01-01 RX ADMIN — IPRATROPIUM BROMIDE AND ALBUTEROL SULFATE 3 ML: 2.5; .5 SOLUTION RESPIRATORY (INHALATION) at 19:33

## 2018-01-01 RX ADMIN — SODIUM CHLORIDE 100 ML/HR: 9 INJECTION, SOLUTION INTRAVENOUS at 18:08

## 2018-01-01 RX ADMIN — PREDNISONE 20 MG: 20 TABLET ORAL at 08:16

## 2018-01-01 RX ADMIN — MORPHINE SULFATE 1 MG: 10 INJECTION INTRAVENOUS at 21:34

## 2018-01-01 RX ADMIN — ASPIRIN 325 MG: 325 TABLET, DELAYED RELEASE ORAL at 09:23

## 2018-01-01 RX ADMIN — Medication 2 TABLET: at 16:04

## 2018-01-01 RX ADMIN — IPRATROPIUM BROMIDE AND ALBUTEROL SULFATE 3 ML: 2.5; .5 SOLUTION RESPIRATORY (INHALATION) at 22:53

## 2018-01-01 RX ADMIN — LORAZEPAM 0.5 MG: 2 INJECTION INTRAMUSCULAR; INTRAVENOUS at 06:16

## 2018-01-01 RX ADMIN — METOPROLOL TARTRATE 25 MG: 25 TABLET ORAL at 21:02

## 2018-01-01 RX ADMIN — DOXYCYCLINE 100 MG: 100 INJECTION, POWDER, LYOPHILIZED, FOR SOLUTION INTRAVENOUS at 11:06

## 2018-01-01 RX ADMIN — PANTOPRAZOLE SODIUM 40 MG: 40 TABLET, DELAYED RELEASE ORAL at 21:03

## 2018-01-01 RX ADMIN — SODIUM CHLORIDE 500 ML: 9 INJECTION, SOLUTION INTRAVENOUS at 12:59

## 2018-01-01 RX ADMIN — ASPIRIN 81 MG: 81 TABLET, COATED ORAL at 08:07

## 2018-01-01 RX ADMIN — Medication 2 TABLET: at 08:44

## 2018-01-01 RX ADMIN — ASPIRIN 81 MG: 81 TABLET, COATED ORAL at 08:16

## 2018-01-01 RX ADMIN — HEPARIN SODIUM 5000 UNITS: 5000 INJECTION, SOLUTION INTRAVENOUS; SUBCUTANEOUS at 06:07

## 2018-01-01 RX ADMIN — DICLOFENAC SODIUM 2 G: 10 GEL TOPICAL at 17:59

## 2018-01-01 RX ADMIN — NYSTATIN 500000 UNITS: 100000 SUSPENSION ORAL at 08:43

## 2018-01-01 RX ADMIN — MORPHINE SULFATE 2 MG: 10 INJECTION INTRAVENOUS at 11:43

## 2018-01-01 RX ADMIN — LORAZEPAM 0.5 MG: 2 INJECTION INTRAMUSCULAR; INTRAVENOUS at 17:40

## 2018-01-01 RX ADMIN — HEPARIN SODIUM 5000 UNITS: 5000 INJECTION, SOLUTION INTRAVENOUS; SUBCUTANEOUS at 16:04

## 2018-01-01 RX ADMIN — METHYLPREDNISOLONE SODIUM SUCCINATE 20 MG: 40 INJECTION, POWDER, FOR SOLUTION INTRAMUSCULAR; INTRAVENOUS at 11:05

## 2018-01-01 RX ADMIN — IPRATROPIUM BROMIDE AND ALBUTEROL SULFATE 3 ML: 2.5; .5 SOLUTION RESPIRATORY (INHALATION) at 16:47

## 2018-01-01 RX ADMIN — MORPHINE SULFATE 1 MG: 10 INJECTION INTRAVENOUS at 02:53

## 2018-01-01 RX ADMIN — NYSTATIN 500000 UNITS: 100000 SUSPENSION ORAL at 21:00

## 2018-01-01 RX ADMIN — METHYLPREDNISOLONE SODIUM SUCCINATE 20 MG: 40 INJECTION, POWDER, FOR SOLUTION INTRAMUSCULAR; INTRAVENOUS at 00:22

## 2018-01-01 RX ADMIN — TAZOBACTAM SODIUM AND PIPERACILLIN SODIUM 4.5 G: 500; 4 INJECTION, SOLUTION INTRAVENOUS at 13:36

## 2018-01-01 RX ADMIN — MORPHINE SULFATE 1 MG: 10 INJECTION INTRAVENOUS at 12:25

## 2018-01-01 RX ADMIN — HEPARIN SODIUM 12 UNITS/KG/HR: 10000 INJECTION, SOLUTION INTRAVENOUS at 22:43

## 2018-01-01 RX ADMIN — IPRATROPIUM BROMIDE AND ALBUTEROL SULFATE 3 ML: 2.5; .5 SOLUTION RESPIRATORY (INHALATION) at 16:07

## 2018-01-01 RX ADMIN — MORPHINE SULFATE 2 MG: 10 INJECTION INTRAVENOUS at 23:57

## 2018-01-01 RX ADMIN — PANTOPRAZOLE SODIUM 40 MG: 40 TABLET, DELAYED RELEASE ORAL at 20:38

## 2018-01-01 RX ADMIN — TAMSULOSIN HYDROCHLORIDE 0.4 MG: 0.4 CAPSULE ORAL at 20:40

## 2018-01-01 RX ADMIN — GLYCOPYRROLATE 0.4 MG: 0.2 INJECTION, SOLUTION INTRAMUSCULAR; INTRAVENOUS at 13:15

## 2018-01-01 RX ADMIN — GLYCERIN 2 G: 2 SUPPOSITORY RECTAL at 14:01

## 2018-01-01 RX ADMIN — MORPHINE SULFATE 1 MG: 10 INJECTION INTRAVENOUS at 17:33

## 2018-01-01 RX ADMIN — LORAZEPAM 0.25 MG: 2 INJECTION INTRAMUSCULAR; INTRAVENOUS at 21:10

## 2018-01-01 RX ADMIN — PANTOPRAZOLE SODIUM 40 MG: 40 TABLET, DELAYED RELEASE ORAL at 21:02

## 2018-01-01 RX ADMIN — DICLOFENAC SODIUM 2 G: 10 GEL TOPICAL at 21:28

## 2018-01-01 RX ADMIN — HEPARIN SODIUM 5000 UNITS: 5000 INJECTION, SOLUTION INTRAVENOUS; SUBCUTANEOUS at 06:30

## 2018-01-01 RX ADMIN — LORAZEPAM 0.5 MG: 2 INJECTION INTRAMUSCULAR; INTRAVENOUS at 12:32

## 2018-01-01 RX ADMIN — MORPHINE SULFATE 2 MG: 2 INJECTION, SOLUTION INTRAMUSCULAR; INTRAVENOUS at 04:36

## 2018-01-01 RX ADMIN — METOPROLOL TARTRATE 25 MG: 25 TABLET ORAL at 08:16

## 2018-01-01 RX ADMIN — HYDROGEN PEROXIDE: 3 LIQUID TOPICAL at 21:49

## 2018-01-01 RX ADMIN — SODIUM CHLORIDE 500 ML: 9 INJECTION, SOLUTION INTRAVENOUS at 15:47

## 2018-01-01 RX ADMIN — LORAZEPAM 0.5 MG: 2 INJECTION INTRAMUSCULAR; INTRAVENOUS at 11:42

## 2018-01-01 RX ADMIN — TAMSULOSIN HYDROCHLORIDE 0.4 MG: 0.4 CAPSULE ORAL at 21:03

## 2018-01-01 RX ADMIN — DOCUSATE SODIUM 100 MG: 100 CAPSULE, LIQUID FILLED ORAL at 11:06

## 2018-01-01 RX ADMIN — MORPHINE SULFATE 2 MG: 10 INJECTION INTRAVENOUS at 18:10

## 2018-01-01 RX ADMIN — DOCUSATE SODIUM 100 MG: 100 CAPSULE, LIQUID FILLED ORAL at 20:38

## 2018-01-01 RX ADMIN — HYDROGEN PEROXIDE: 3 LIQUID TOPICAL at 20:02

## 2018-01-01 RX ADMIN — SODIUM CHLORIDE SOLN NEBU 3% 4 ML: 3 NEBU SOLN at 17:43

## 2018-01-01 RX ADMIN — ASPIRIN 81 MG: 81 TABLET, COATED ORAL at 08:43

## 2018-01-01 RX ADMIN — HYDROGEN PEROXIDE: 3 LIQUID TOPICAL at 08:45

## 2018-01-01 RX ADMIN — MAGNESIUM SULFATE IN WATER 2 G: 40 INJECTION, SOLUTION INTRAVENOUS at 15:26

## 2018-01-01 RX ADMIN — HYDROGEN PEROXIDE: 3 LIQUID TOPICAL at 09:43

## 2018-01-01 RX ADMIN — LORAZEPAM 0.5 MG: 2 INJECTION INTRAMUSCULAR; INTRAVENOUS at 17:50

## 2018-01-01 RX ADMIN — MORPHINE SULFATE 2 MG: 10 INJECTION INTRAVENOUS at 15:42

## 2018-01-01 RX ADMIN — MORPHINE SULFATE: 1 INJECTION INTRAVENOUS at 10:12

## 2018-01-01 RX ADMIN — HEPARIN SODIUM 5000 UNITS: 5000 INJECTION, SOLUTION INTRAVENOUS; SUBCUTANEOUS at 21:26

## 2018-01-01 RX ADMIN — MORPHINE SULFATE 1 MG: 10 INJECTION INTRAVENOUS at 17:28

## 2018-01-01 RX ADMIN — PREDNISONE 20 MG: 20 TABLET ORAL at 08:08

## 2018-01-01 RX ADMIN — MORPHINE SULFATE 1 MG: 10 INJECTION INTRAVENOUS at 23:33

## 2018-01-01 RX ADMIN — Medication 2 TABLET: at 09:24

## 2018-01-01 RX ADMIN — LORAZEPAM 0.5 MG: 2 INJECTION INTRAMUSCULAR; INTRAVENOUS at 05:23

## 2018-01-01 RX ADMIN — MORPHINE SULFATE 2 MG: 2 INJECTION, SOLUTION INTRAMUSCULAR; INTRAVENOUS at 18:02

## 2018-01-01 RX ADMIN — TAZOBACTAM SODIUM AND PIPERACILLIN SODIUM 4.5 G: 500; 4 INJECTION, SOLUTION INTRAVENOUS at 20:27

## 2018-01-01 RX ADMIN — DICLOFENAC SODIUM 2 G: 10 GEL TOPICAL at 21:06

## 2018-01-01 RX ADMIN — MORPHINE SULFATE: 1 INJECTION INTRAVENOUS at 16:41

## 2018-01-01 RX ADMIN — HEPARIN SODIUM 15 UNITS/KG/HR: 10000 INJECTION, SOLUTION INTRAVENOUS at 00:24

## 2018-01-01 RX ADMIN — LORAZEPAM 0.5 MG: 2 INJECTION INTRAMUSCULAR; INTRAVENOUS at 23:43

## 2018-01-01 RX ADMIN — POLYETHYLENE GLYCOL (3350) 17 G: 17 POWDER, FOR SOLUTION ORAL at 09:24

## 2018-01-01 RX ADMIN — MORPHINE SULFATE 2 MG: 10 INJECTION INTRAVENOUS at 03:57

## 2018-01-01 RX ADMIN — HEPARIN SODIUM 5000 UNITS: 5000 INJECTION, SOLUTION INTRAVENOUS; SUBCUTANEOUS at 14:10

## 2018-01-01 RX ADMIN — HYDROGEN PEROXIDE: 3 LIQUID TOPICAL at 21:11

## 2018-01-01 RX ADMIN — HEPARIN SODIUM 5000 UNITS: 5000 INJECTION, SOLUTION INTRAVENOUS; SUBCUTANEOUS at 05:27

## 2018-01-01 RX ADMIN — MORPHINE SULFATE 1 MG: 10 INJECTION INTRAVENOUS at 11:54

## 2018-01-01 RX ADMIN — DIGOXIN 250 MCG: 0.25 INJECTION INTRAMUSCULAR; INTRAVENOUS at 13:36

## 2018-01-01 RX ADMIN — DEXAMETHASONE SODIUM PHOSPHATE 4 MG: 4 INJECTION, SOLUTION INTRAMUSCULAR; INTRAVENOUS at 09:00

## 2018-01-01 RX ADMIN — Medication 2 TABLET: at 21:24

## 2018-01-01 RX ADMIN — PANTOPRAZOLE SODIUM 40 MG: 40 INJECTION, POWDER, FOR SOLUTION INTRAVENOUS at 05:15

## 2018-01-01 RX ADMIN — POLYETHYLENE GLYCOL (3350) 17 G: 17 POWDER, FOR SOLUTION ORAL at 17:38

## 2018-01-01 RX ADMIN — HEPARIN SODIUM 5000 UNITS: 5000 INJECTION, SOLUTION INTRAVENOUS; SUBCUTANEOUS at 17:56

## 2018-01-01 RX ADMIN — GLYCOPYRROLATE 0.1 MG: 0.2 INJECTION INTRAMUSCULAR; INTRAVENOUS at 20:27

## 2018-01-01 RX ADMIN — IPRATROPIUM BROMIDE AND ALBUTEROL SULFATE 3 ML: 2.5; .5 SOLUTION RESPIRATORY (INHALATION) at 19:44

## 2018-01-01 RX ADMIN — TAZOBACTAM SODIUM AND PIPERACILLIN SODIUM 4.5 G: 500; 4 INJECTION, SOLUTION INTRAVENOUS at 03:04

## 2018-01-01 RX ADMIN — HYDROGEN PEROXIDE: 3 LIQUID TOPICAL at 21:09

## 2018-01-01 RX ADMIN — TAMSULOSIN HYDROCHLORIDE 0.4 MG: 0.4 CAPSULE ORAL at 20:54

## 2018-01-01 RX ADMIN — HEPARIN SODIUM 5000 UNITS: 5000 INJECTION, SOLUTION INTRAVENOUS; SUBCUTANEOUS at 21:43

## 2018-01-01 RX ADMIN — BISACODYL 10 MG: 5 TABLET, COATED ORAL at 13:56

## 2018-01-01 RX ADMIN — MORPHINE SULFATE 2 MG: 2 INJECTION, SOLUTION INTRAMUSCULAR; INTRAVENOUS at 16:33

## 2018-01-01 RX ADMIN — PANTOPRAZOLE SODIUM 40 MG: 40 TABLET, DELAYED RELEASE ORAL at 20:54

## 2018-01-01 RX ADMIN — MORPHINE SULFATE 2 MG: 10 INJECTION INTRAVENOUS at 19:51

## 2018-01-01 RX ADMIN — HYDROGEN PEROXIDE: 3 LIQUID TOPICAL at 10:11

## 2018-01-01 RX ADMIN — DOCUSATE SODIUM 100 MG: 100 CAPSULE, LIQUID FILLED ORAL at 09:09

## 2018-01-01 RX ADMIN — ASPIRIN 325 MG: 325 TABLET, DELAYED RELEASE ORAL at 08:13

## 2018-01-01 RX ADMIN — DICLOFENAC SODIUM 2 G: 10 GEL TOPICAL at 08:19

## 2018-01-01 RX ADMIN — DEXAMETHASONE SODIUM PHOSPHATE 4 MG: 4 INJECTION, SOLUTION INTRAMUSCULAR; INTRAVENOUS at 12:25

## 2018-01-01 RX ADMIN — HYDROGEN PEROXIDE: 3 LIQUID TOPICAL at 08:15

## 2018-01-01 RX ADMIN — MORPHINE SULFATE 1 MG: 10 INJECTION INTRAVENOUS at 12:39

## 2018-01-01 RX ADMIN — NYSTATIN 500000 UNITS: 100000 SUSPENSION ORAL at 12:26

## 2018-01-01 RX ADMIN — MORPHINE SULFATE 1 MG: 10 INJECTION INTRAVENOUS at 06:10

## 2018-01-01 RX ADMIN — LEVOFLOXACIN 750 MG: 5 INJECTION, SOLUTION INTRAVENOUS at 13:48

## 2018-01-01 RX ADMIN — DOXYCYCLINE 100 MG: 100 INJECTION, POWDER, LYOPHILIZED, FOR SOLUTION INTRAVENOUS at 00:22

## 2018-01-01 RX ADMIN — MINERAL OIL AND WHITE PETROLATUM: 150; 830 OINTMENT OPHTHALMIC at 20:25

## 2018-01-01 RX ADMIN — POLYETHYLENE GLYCOL (3350) 17 G: 17 POWDER, FOR SOLUTION ORAL at 08:12

## 2018-01-01 RX ADMIN — DIGOXIN 250 MCG: 0.25 INJECTION INTRAMUSCULAR; INTRAVENOUS at 18:10

## 2018-01-01 RX ADMIN — MINERAL OIL AND WHITE PETROLATUM: 150; 830 OINTMENT OPHTHALMIC at 08:15

## 2018-01-01 RX ADMIN — ASPIRIN 81 MG: 81 TABLET, COATED ORAL at 09:09

## 2018-01-01 RX ADMIN — DICLOFENAC SODIUM 2 G: 10 GEL TOPICAL at 12:52

## 2018-01-01 RX ADMIN — TAZOBACTAM SODIUM AND PIPERACILLIN SODIUM 4.5 G: .5; 4 INJECTION, POWDER, LYOPHILIZED, FOR SOLUTION INTRAVENOUS at 13:05

## 2018-01-01 RX ADMIN — HEPARIN SODIUM 5000 UNITS: 5000 INJECTION, SOLUTION INTRAVENOUS; SUBCUTANEOUS at 20:55

## 2018-01-01 RX ADMIN — HEPARIN SODIUM 5000 UNITS: 5000 INJECTION, SOLUTION INTRAVENOUS; SUBCUTANEOUS at 13:55

## 2018-01-01 RX ADMIN — Medication 10 ML: at 15:46

## 2018-01-01 RX ADMIN — HEPARIN SODIUM 2000 UNITS: 1000 INJECTION, SOLUTION INTRAVENOUS; SUBCUTANEOUS at 15:14

## 2018-01-01 RX ADMIN — HEPARIN SODIUM 5000 UNITS: 5000 INJECTION, SOLUTION INTRAVENOUS; SUBCUTANEOUS at 20:40

## 2018-01-01 RX ADMIN — Medication 2 TABLET: at 08:13

## 2018-01-01 RX ADMIN — VANCOMYCIN HYDROCHLORIDE 1000 MG: 1 INJECTION, SOLUTION INTRAVENOUS at 11:47

## 2018-01-01 RX ADMIN — LORAZEPAM 0.5 MG: 2 INJECTION INTRAMUSCULAR; INTRAVENOUS at 17:12

## 2018-01-01 RX ADMIN — MINERAL OIL AND WHITE PETROLATUM: 150; 830 OINTMENT OPHTHALMIC at 23:56

## 2018-01-01 RX ADMIN — PREDNISONE 20 MG: 20 TABLET ORAL at 16:04

## 2018-01-01 RX ADMIN — MORPHINE SULFATE 1 MG: 10 INJECTION INTRAVENOUS at 06:29

## 2018-01-01 RX ADMIN — HYDROGEN PEROXIDE: 3 LIQUID TOPICAL at 17:09

## 2018-01-01 RX ADMIN — ACETAMINOPHEN 650 MG: 325 TABLET ORAL at 22:10

## 2018-01-01 RX ADMIN — NYSTATIN 500000 UNITS: 100000 SUSPENSION ORAL at 17:05

## 2018-01-01 RX ADMIN — PANTOPRAZOLE SODIUM 40 MG: 40 TABLET, DELAYED RELEASE ORAL at 20:40

## 2018-01-01 RX ADMIN — HYDROGEN PEROXIDE: 3 LIQUID TOPICAL at 20:09

## 2018-01-01 RX ADMIN — ASPIRIN 325 MG: 325 TABLET, DELAYED RELEASE ORAL at 08:25

## 2018-01-01 RX ADMIN — DEXAMETHASONE SODIUM PHOSPHATE 4 MG: 4 INJECTION, SOLUTION INTRAMUSCULAR; INTRAVENOUS at 08:05

## 2018-01-01 RX ADMIN — VANCOMYCIN HYDROCHLORIDE 1500 MG: 10 INJECTION, POWDER, LYOPHILIZED, FOR SOLUTION INTRAVENOUS at 13:03

## 2018-01-01 RX ADMIN — HEPARIN SODIUM 5000 UNITS: 5000 INJECTION, SOLUTION INTRAVENOUS; SUBCUTANEOUS at 14:58

## 2018-01-01 RX ADMIN — LORAZEPAM 0.5 MG: 2 INJECTION INTRAMUSCULAR; INTRAVENOUS at 11:29

## 2018-01-01 RX ADMIN — LORAZEPAM 0.5 MG: 2 INJECTION INTRAMUSCULAR; INTRAVENOUS at 05:44

## 2018-01-01 RX ADMIN — TRAMADOL HYDROCHLORIDE 50 MG: 50 TABLET, COATED ORAL at 11:44

## 2018-01-01 RX ADMIN — MORPHINE SULFATE 2 MG: 2 INJECTION, SOLUTION INTRAMUSCULAR; INTRAVENOUS at 20:02

## 2018-01-01 RX ADMIN — MINERAL OIL AND WHITE PETROLATUM: 150; 830 OINTMENT OPHTHALMIC at 12:49

## 2018-01-01 RX ADMIN — HEPARIN SODIUM 5000 UNITS: 5000 INJECTION, SOLUTION INTRAVENOUS; SUBCUTANEOUS at 20:39

## 2018-01-01 RX ADMIN — MORPHINE SULFATE 1 MG: 10 INJECTION INTRAVENOUS at 23:41

## 2018-01-01 RX ADMIN — HEPARIN SODIUM 5000 UNITS: 5000 INJECTION, SOLUTION INTRAVENOUS; SUBCUTANEOUS at 13:56

## 2018-01-01 RX ADMIN — FUROSEMIDE 40 MG: 10 INJECTION, SOLUTION INTRAMUSCULAR; INTRAVENOUS at 13:38

## 2018-01-01 RX ADMIN — HYDROGEN PEROXIDE: 3 LIQUID TOPICAL at 17:03

## 2018-01-01 RX ADMIN — POLYETHYLENE GLYCOL (3350) 17 G: 17 POWDER, FOR SOLUTION ORAL at 08:25

## 2018-01-01 RX ADMIN — Medication 2 TABLET: at 08:16

## 2018-01-01 RX ADMIN — METOPROLOL TARTRATE 25 MG: 25 TABLET ORAL at 09:09

## 2018-01-01 RX ADMIN — TAMSULOSIN HYDROCHLORIDE 0.4 MG: 0.4 CAPSULE ORAL at 21:26

## 2018-01-01 RX ADMIN — Medication 10 ML: at 22:09

## 2018-01-01 RX ADMIN — PANTOPRAZOLE SODIUM 40 MG: 40 TABLET, DELAYED RELEASE ORAL at 22:08

## 2018-01-01 RX ADMIN — MORPHINE SULFATE 1 MG: 10 INJECTION INTRAVENOUS at 05:13

## 2018-01-01 RX ADMIN — MORPHINE SULFATE 2 MG: 2 INJECTION, SOLUTION INTRAMUSCULAR; INTRAVENOUS at 17:59

## 2018-01-01 RX ADMIN — MORPHINE SULFATE 1 MG: 10 INJECTION INTRAVENOUS at 17:01

## 2018-01-01 RX ADMIN — PREDNISONE 20 MG: 20 TABLET ORAL at 08:43

## 2018-01-01 RX ADMIN — MORPHINE SULFATE 2 MG: 10 INJECTION INTRAVENOUS at 14:35

## 2018-01-01 RX ADMIN — BISACODYL 10 MG: 10 SUPPOSITORY RECTAL at 17:03

## 2018-01-01 RX ADMIN — DEXAMETHASONE SODIUM PHOSPHATE 4 MG: 4 INJECTION, SOLUTION INTRAMUSCULAR; INTRAVENOUS at 15:13

## 2018-01-01 RX ADMIN — LORAZEPAM 0.5 MG: 2 INJECTION INTRAMUSCULAR; INTRAVENOUS at 20:27

## 2018-01-01 RX ADMIN — POLYETHYLENE GLYCOL (3350) 17 G: 17 POWDER, FOR SOLUTION ORAL at 09:09

## 2018-01-01 RX ADMIN — MORPHINE SULFATE 2 MG: 10 INJECTION INTRAVENOUS at 08:05

## 2018-01-01 RX ADMIN — METOPROLOL TARTRATE 25 MG: 25 TABLET ORAL at 21:25

## 2018-01-01 RX ADMIN — NYSTATIN 500000 UNITS: 100000 SUSPENSION ORAL at 21:34

## 2018-01-01 RX ADMIN — DICLOFENAC SODIUM 2 G: 10 GEL TOPICAL at 08:07

## 2018-01-01 RX ADMIN — SODIUM CHLORIDE 75 ML/HR: 9 INJECTION, SOLUTION INTRAVENOUS at 02:09

## 2018-01-01 RX ADMIN — HYDROGEN PEROXIDE: 3 LIQUID TOPICAL at 16:58

## 2018-01-01 RX ADMIN — LORAZEPAM 0.25 MG: 2 INJECTION INTRAMUSCULAR; INTRAVENOUS at 16:24

## 2018-01-01 RX ADMIN — DOCUSATE SODIUM 100 MG: 100 CAPSULE, LIQUID FILLED ORAL at 20:54

## 2018-01-01 RX ADMIN — Medication 2 TABLET: at 21:02

## 2018-01-01 RX ADMIN — HYDROGEN PEROXIDE: 3 LIQUID TOPICAL at 15:42

## 2018-01-01 RX ADMIN — NYSTATIN 500000 UNITS: 100000 SUSPENSION ORAL at 17:34

## 2018-01-01 RX ADMIN — HYDROGEN PEROXIDE: 3 LIQUID TOPICAL at 15:43

## 2018-01-01 RX ADMIN — LORAZEPAM 0.5 MG: 2 INJECTION INTRAMUSCULAR; INTRAVENOUS at 23:19

## 2018-01-01 RX ADMIN — IPRATROPIUM BROMIDE AND ALBUTEROL SULFATE 3 ML: 2.5; .5 SOLUTION RESPIRATORY (INHALATION) at 12:14

## 2018-01-01 RX ADMIN — MORPHINE SULFATE 2 MG: 2 INJECTION, SOLUTION INTRAMUSCULAR; INTRAVENOUS at 13:15

## 2018-01-01 RX ADMIN — METOPROLOL TARTRATE 25 MG: 25 TABLET ORAL at 20:38

## 2018-01-01 RX ADMIN — LORAZEPAM 0.5 MG: 2 INJECTION INTRAMUSCULAR; INTRAVENOUS at 12:13

## 2018-01-01 RX ADMIN — HEPARIN SODIUM 5000 UNITS: 5000 INJECTION, SOLUTION INTRAVENOUS; SUBCUTANEOUS at 14:01

## 2018-01-01 RX ADMIN — MINERAL OIL AND WHITE PETROLATUM: 150; 830 OINTMENT OPHTHALMIC at 08:36

## 2018-01-01 RX ADMIN — MORPHINE SULFATE 2 MG: 10 INJECTION INTRAVENOUS at 13:43

## 2018-01-01 RX ADMIN — TAMSULOSIN HYDROCHLORIDE 0.4 MG: 0.4 CAPSULE ORAL at 21:02

## 2018-01-01 RX ADMIN — IPRATROPIUM BROMIDE AND ALBUTEROL SULFATE 3 ML: 2.5; .5 SOLUTION RESPIRATORY (INHALATION) at 08:03

## 2018-01-01 RX ADMIN — MORPHINE SULFATE 1 MG: 10 INJECTION INTRAVENOUS at 19:31

## 2018-01-01 RX ADMIN — HEPARIN SODIUM 5000 UNITS: 5000 INJECTION, SOLUTION INTRAVENOUS; SUBCUTANEOUS at 06:24

## 2018-01-01 RX ADMIN — MORPHINE SULFATE 2 MG: 2 INJECTION, SOLUTION INTRAMUSCULAR; INTRAVENOUS at 11:53

## 2018-01-01 RX ADMIN — GLYCOPYRROLATE 0.4 MG: 0.2 INJECTION, SOLUTION INTRAMUSCULAR; INTRAVENOUS at 20:02

## 2018-01-01 RX ADMIN — PREDNISONE 20 MG: 20 TABLET ORAL at 09:24

## 2018-01-01 RX ADMIN — HYDROGEN PEROXIDE: 3 LIQUID TOPICAL at 08:06

## 2018-01-01 RX ADMIN — POLYETHYLENE GLYCOL (3350) 17 G: 17 POWDER, FOR SOLUTION ORAL at 11:05

## 2018-01-01 RX ADMIN — ASPIRIN 325 MG: 325 TABLET, DELAYED RELEASE ORAL at 11:06

## 2018-01-01 RX ADMIN — DEXAMETHASONE SODIUM PHOSPHATE 4 MG: 4 INJECTION, SOLUTION INTRAMUSCULAR; INTRAVENOUS at 11:42

## 2018-01-01 RX ADMIN — MORPHINE SULFATE 1 MG: 10 INJECTION INTRAVENOUS at 23:13

## 2018-01-01 RX ADMIN — TAMSULOSIN HYDROCHLORIDE 0.4 MG: 0.4 CAPSULE ORAL at 20:26

## 2018-01-01 RX ADMIN — ACETAMINOPHEN 650 MG: 325 TABLET, FILM COATED ORAL at 21:02

## 2018-01-01 RX ADMIN — IPRATROPIUM BROMIDE AND ALBUTEROL SULFATE 3 ML: 2.5; .5 SOLUTION RESPIRATORY (INHALATION) at 07:42

## 2018-01-01 RX ADMIN — LORAZEPAM 0.5 MG: 2 INJECTION INTRAMUSCULAR; INTRAVENOUS at 23:56

## 2018-01-01 RX ADMIN — PANTOPRAZOLE SODIUM 40 MG: 40 INJECTION, POWDER, FOR SOLUTION INTRAVENOUS at 11:22

## 2018-01-01 RX ADMIN — LORAZEPAM 0.25 MG: 2 INJECTION INTRAMUSCULAR; INTRAVENOUS at 20:09

## 2018-01-01 RX ADMIN — TAMSULOSIN HYDROCHLORIDE 0.4 MG: 0.4 CAPSULE ORAL at 22:08

## 2018-01-01 RX ADMIN — LORAZEPAM 0.25 MG: 2 INJECTION INTRAMUSCULAR; INTRAVENOUS at 22:06

## 2018-01-01 RX ADMIN — Medication 2 TABLET: at 22:08

## 2018-01-01 RX ADMIN — METOPROLOL TARTRATE 25 MG: 25 TABLET ORAL at 09:14

## 2018-01-01 RX ADMIN — POTASSIUM CHLORIDE 40 MEQ: 750 CAPSULE, EXTENDED RELEASE ORAL at 09:36

## 2018-01-01 RX ADMIN — METOPROLOL TARTRATE 25 MG: 25 TABLET ORAL at 08:43

## 2018-01-01 RX ADMIN — Medication 2 TABLET: at 20:26

## 2018-01-01 RX ADMIN — HYDROGEN PEROXIDE: 3 LIQUID TOPICAL at 07:41

## 2018-01-01 RX ADMIN — MORPHINE SULFATE: 1 INJECTION INTRAVENOUS at 12:48

## 2018-01-01 RX ADMIN — MORPHINE SULFATE 2 MG: 10 INJECTION INTRAVENOUS at 11:18

## 2018-01-01 RX ADMIN — BISACODYL 10 MG: 5 TABLET, COATED ORAL at 14:10

## 2018-01-01 RX ADMIN — PANTOPRAZOLE SODIUM 40 MG: 40 INJECTION, POWDER, FOR SOLUTION INTRAVENOUS at 06:09

## 2018-01-01 RX ADMIN — MORPHINE SULFATE 1 MG: 10 INJECTION INTRAVENOUS at 17:53

## 2018-01-01 RX ADMIN — DICLOFENAC SODIUM 2 G: 10 GEL TOPICAL at 09:23

## 2018-01-01 RX ADMIN — DEXAMETHASONE SODIUM PHOSPHATE 4 MG: 4 INJECTION, SOLUTION INTRAMUSCULAR; INTRAVENOUS at 09:06

## 2018-01-01 RX ADMIN — MORPHINE SULFATE 2 MG: 2 INJECTION, SOLUTION INTRAMUSCULAR; INTRAVENOUS at 01:54

## 2018-01-01 RX ADMIN — HYDROGEN PEROXIDE: 3 LIQUID TOPICAL at 16:32

## 2018-01-01 RX ADMIN — TAMSULOSIN HYDROCHLORIDE 0.4 MG: 0.4 CAPSULE ORAL at 21:43

## 2018-01-01 RX ADMIN — LORAZEPAM 0.5 MG: 2 INJECTION INTRAMUSCULAR; INTRAVENOUS at 05:18

## 2018-01-01 RX ADMIN — HEPARIN SODIUM 5000 UNITS: 5000 INJECTION, SOLUTION INTRAVENOUS; SUBCUTANEOUS at 06:06

## 2018-01-01 RX ADMIN — MORPHINE SULFATE 1 MG: 10 INJECTION INTRAVENOUS at 10:51

## 2018-01-01 RX ADMIN — DICLOFENAC SODIUM 2 G: 10 GEL TOPICAL at 16:04

## 2018-01-01 RX ADMIN — DICLOFENAC SODIUM 2 G: 10 GEL TOPICAL at 08:44

## 2018-01-01 RX ADMIN — HYDROGEN PEROXIDE: 3 LIQUID TOPICAL at 16:46

## 2018-01-01 RX ADMIN — METOPROLOL TARTRATE 25 MG: 25 TABLET ORAL at 20:26

## 2018-01-01 RX ADMIN — DIGOXIN 125 MCG: 0.25 INJECTION INTRAMUSCULAR; INTRAVENOUS at 11:23

## 2018-01-01 RX ADMIN — HEPARIN SODIUM 5000 UNITS: 5000 INJECTION, SOLUTION INTRAVENOUS; SUBCUTANEOUS at 16:00

## 2018-01-01 RX ADMIN — MINERAL OIL AND WHITE PETROLATUM: 150; 830 OINTMENT OPHTHALMIC at 21:49

## 2018-01-01 RX ADMIN — DEXAMETHASONE SODIUM PHOSPHATE 4 MG: 4 INJECTION, SOLUTION INTRAMUSCULAR; INTRAVENOUS at 08:42

## 2018-01-01 RX ADMIN — DIGOXIN 250 MCG: 0.25 INJECTION INTRAMUSCULAR; INTRAVENOUS at 09:50

## 2018-01-01 RX ADMIN — MORPHINE SULFATE 2 MG: 2 INJECTION, SOLUTION INTRAMUSCULAR; INTRAVENOUS at 04:38

## 2018-01-01 RX ADMIN — HYDROGEN PEROXIDE: 3 LIQUID TOPICAL at 08:37

## 2018-01-01 RX ADMIN — TAZOBACTAM SODIUM AND PIPERACILLIN SODIUM 4.5 G: 500; 4 INJECTION, SOLUTION INTRAVENOUS at 20:40

## 2018-01-01 RX ADMIN — NYSTATIN 500000 UNITS: 100000 SUSPENSION ORAL at 08:50

## 2018-01-01 RX ADMIN — MINERAL OIL AND WHITE PETROLATUM: 150; 830 OINTMENT OPHTHALMIC at 21:09

## 2018-01-01 RX ADMIN — MORPHINE SULFATE: 1 INJECTION INTRAVENOUS at 02:26

## 2018-01-01 RX ADMIN — MORPHINE SULFATE 2 MG: 10 INJECTION INTRAVENOUS at 09:58

## 2018-01-01 RX ADMIN — NYSTATIN 500000 UNITS: 100000 SUSPENSION ORAL at 17:01

## 2018-01-01 RX ADMIN — DICLOFENAC SODIUM 2 G: 10 GEL TOPICAL at 20:40

## 2018-01-01 RX ADMIN — Medication 2 TABLET: at 21:03

## 2018-01-01 RX ADMIN — HEPARIN SODIUM 5000 UNITS: 5000 INJECTION, SOLUTION INTRAVENOUS; SUBCUTANEOUS at 05:48

## 2018-01-01 RX ADMIN — Medication 2 TABLET: at 08:25

## 2018-01-01 RX ADMIN — TAZOBACTAM SODIUM AND PIPERACILLIN SODIUM 4.5 G: 500; 4 INJECTION, SOLUTION INTRAVENOUS at 08:25

## 2018-01-01 RX ADMIN — DICLOFENAC SODIUM 2 G: 10 GEL TOPICAL at 18:23

## 2018-01-01 RX ADMIN — LORAZEPAM 0.5 MG: 2 INJECTION INTRAMUSCULAR; INTRAVENOUS at 23:36

## 2018-01-01 RX ADMIN — MORPHINE SULFATE: 1 INJECTION INTRAVENOUS at 00:48

## 2018-01-01 RX ADMIN — MINERAL OIL AND WHITE PETROLATUM: 150; 830 OINTMENT OPHTHALMIC at 08:44

## 2018-01-01 RX ADMIN — DICLOFENAC SODIUM 2 G: 10 GEL TOPICAL at 13:11

## 2018-01-01 RX ADMIN — POLYETHYLENE GLYCOL (3350) 17 G: 17 POWDER, FOR SOLUTION ORAL at 09:14

## 2018-01-01 RX ADMIN — MORPHINE SULFATE 2 MG: 10 INJECTION INTRAVENOUS at 15:13

## 2018-01-01 RX ADMIN — LORAZEPAM 0.25 MG: 2 INJECTION INTRAMUSCULAR; INTRAVENOUS at 01:23

## 2018-01-01 RX ADMIN — LORAZEPAM 0.5 MG: 2 INJECTION INTRAMUSCULAR; INTRAVENOUS at 16:31

## 2018-01-01 RX ADMIN — ASPIRIN 81 MG: 81 TABLET, COATED ORAL at 09:14

## 2018-01-01 RX ADMIN — METOPROLOL TARTRATE 25 MG: 25 TABLET ORAL at 08:13

## 2018-01-01 RX ADMIN — HEPARIN SODIUM 5000 UNITS: 5000 INJECTION, SOLUTION INTRAVENOUS; SUBCUTANEOUS at 12:59

## 2018-01-01 RX ADMIN — DIGOXIN 250 MCG: 0.25 INJECTION INTRAMUSCULAR; INTRAVENOUS at 09:41

## 2018-01-01 RX ADMIN — HYDROGEN PEROXIDE: 3 LIQUID TOPICAL at 20:25

## 2018-01-01 RX ADMIN — HEPARIN SODIUM 5000 UNITS: 5000 INJECTION, SOLUTION INTRAVENOUS; SUBCUTANEOUS at 05:15

## 2018-01-01 RX ADMIN — METOPROLOL TARTRATE 25 MG: 25 TABLET ORAL at 08:25

## 2018-01-01 RX ADMIN — HYDROGEN PEROXIDE: 3 LIQUID TOPICAL at 21:00

## 2018-01-01 RX ADMIN — LORAZEPAM 0.5 MG: 2 INJECTION INTRAMUSCULAR; INTRAVENOUS at 17:59

## 2018-01-01 RX ADMIN — DOCUSATE SODIUM 100 MG: 100 CAPSULE, LIQUID FILLED ORAL at 21:03

## 2018-01-01 RX ADMIN — LORAZEPAM 0.5 MG: 2 INJECTION INTRAMUSCULAR; INTRAVENOUS at 23:55

## 2018-01-01 RX ADMIN — SODIUM CHLORIDE 1000 ML: 9 INJECTION, SOLUTION INTRAVENOUS at 23:41

## 2018-01-01 RX ADMIN — DOCUSATE SODIUM 100 MG: 100 CAPSULE, LIQUID FILLED ORAL at 09:14

## 2018-01-01 RX ADMIN — LORAZEPAM 0.25 MG: 2 INJECTION INTRAMUSCULAR; INTRAVENOUS at 05:55

## 2018-01-01 RX ADMIN — NYSTATIN 500000 UNITS: 100000 SUSPENSION ORAL at 20:28

## 2018-01-01 RX ADMIN — HYDROGEN PEROXIDE: 3 LIQUID TOPICAL at 15:54

## 2018-01-01 RX ADMIN — LORAZEPAM 0.5 MG: 2 INJECTION INTRAMUSCULAR; INTRAVENOUS at 17:09

## 2018-01-01 RX ADMIN — TAZOBACTAM SODIUM AND PIPERACILLIN SODIUM 4.5 G: 500; 4 INJECTION, SOLUTION INTRAVENOUS at 02:35

## 2018-01-01 RX ADMIN — LORAZEPAM 0.5 MG: 2 INJECTION INTRAMUSCULAR; INTRAVENOUS at 05:47

## 2018-01-01 RX ADMIN — MORPHINE SULFATE 1 MG: 10 INJECTION INTRAVENOUS at 12:26

## 2018-01-01 RX ADMIN — TAMSULOSIN HYDROCHLORIDE 0.4 MG: 0.4 CAPSULE ORAL at 20:38

## 2018-01-01 RX ADMIN — ASPIRIN 81 MG: 81 TABLET, COATED ORAL at 09:24

## 2018-01-01 RX ADMIN — ASPIRIN 325 MG: 325 TABLET, DELAYED RELEASE ORAL at 18:08

## 2018-05-01 PROBLEM — R73.9 HYPERGLYCEMIA: Status: ACTIVE | Noted: 2018-01-01

## 2018-05-01 PROBLEM — I50.9 CHF (CONGESTIVE HEART FAILURE) (HCC): Status: ACTIVE | Noted: 2018-01-01

## 2018-05-01 PROBLEM — M79.89 LEFT LEG SWELLING: Status: ACTIVE | Noted: 2018-01-01

## 2018-05-01 PROBLEM — I10 HTN (HYPERTENSION): Status: ACTIVE | Noted: 2018-01-01

## 2018-05-01 PROBLEM — R79.89 ELEVATED SERUM CREATININE: Status: ACTIVE | Noted: 2018-01-01

## 2018-05-01 PROBLEM — M48.00 SPINAL STENOSIS: Status: ACTIVE | Noted: 2018-01-01

## 2018-05-01 PROBLEM — D64.9 NORMOCYTIC ANEMIA: Status: ACTIVE | Noted: 2018-01-01

## 2018-05-01 PROBLEM — E86.0 DEHYDRATION: Status: ACTIVE | Noted: 2018-01-01

## 2018-05-01 PROBLEM — R77.8 ELEVATED TROPONIN: Status: ACTIVE | Noted: 2018-01-01

## 2018-05-01 PROBLEM — R53.1 GENERALIZED WEAKNESS: Status: ACTIVE | Noted: 2018-01-01

## 2018-05-01 PROBLEM — R60.0 BILATERAL LOWER EXTREMITY EDEMA: Status: ACTIVE | Noted: 2018-01-01

## 2018-05-01 PROBLEM — I49.3 PVC (PREMATURE VENTRICULAR CONTRACTION): Status: ACTIVE | Noted: 2018-01-01

## 2018-05-01 NOTE — ED PROVIDER NOTES
"Subjective   86-year-old male resents to the emergency department for evaluation for generalized weakness.  The patient states that he had a fall on April 8.  He states that he was in the kitchen and turned off the lights to go to bed and lost his balance and fell on the kitchen floor.  He was assisted back up by his spouse but fell a second and a third time.  He was not evaluated after the fall.  Since then, he has had generalized weakness and complains of pain in the neck, left upper arm and left leg.  Over the past few days, he has been \"too weak to get up\".  He also complains of swelling of the left lower extremity.  The patient denies any headache.  No chest pain or shortness of breath.  No abdominal pain.  No nausea vomiting or diarrhea.  No urinary symptoms.  Past medical history of congestive heart failure, non-Hodgkin's lymphoma (in remission since radiation in 2007), prostate enlargement (on Flomax).  His PCP is Dr. Coley.        History provided by:  Patient and spouse    Associated symptoms: weakness (generalized weakness)    Associated symptoms: no chest pain, no confusion, no fever, no headaches, no nausea, no shortness of breath and no vomiting    Weakness - Generalized   Severity:  Severe  Onset quality:  Gradual  Duration: ongoing since he fell April 8th.  Timing:  Constant  Progression:  Worsening  Chronicity:  New  Relieved by:  Nothing  Worsened by:  Nothing  Ineffective treatments:  None tried  Associated symptoms: no abdominal pain, no chest pain, no cough, no diarrhea, no dysuria, no fever, no headaches, no melena, no nausea, no shortness of breath and no vomiting        Review of Systems   Constitutional: Positive for fatigue. Negative for appetite change, chills and fever.   HENT: Negative for ear pain, nosebleeds and sore throat.    Eyes: Negative for visual disturbance.   Respiratory: Negative for cough and shortness of breath.    Cardiovascular: Positive for leg swelling (left lower leg " swelling). Negative for chest pain.   Gastrointestinal: Negative for abdominal pain, blood in stool, diarrhea, melena, nausea and vomiting.   Endocrine: Negative for polydipsia, polyphagia and polyuria.   Genitourinary: Negative for decreased urine volume and dysuria.   Musculoskeletal: Negative for back pain and neck pain.   Skin: Negative for wound.   Allergic/Immunologic: Negative for immunocompromised state.   Neurological: Positive for syncope, weakness (generalized weakness) and light-headedness. Negative for headaches.   Hematological: Negative.  Negative for adenopathy.   Psychiatric/Behavioral: Negative for confusion.       Past Medical History:   Diagnosis Date   • Congestive heart failure (CHF)    • Hypertension    • Non Hodgkin's lymphoma        No Known Allergies    Past Surgical History:   Procedure Laterality Date   • HERNIA REPAIR     • PROSTATE SURGERY         History reviewed. No pertinent family history.    Social History     Social History   • Marital status:      Social History Main Topics   • Smoking status: Never Smoker   • Smokeless tobacco: Never Used   • Alcohol use No   • Drug use: No     Other Topics Concern   • Not on file           Objective   Physical Exam   Constitutional: He is oriented to person, place, and time. He appears well-developed. No distress.   Frail appearing with poor skin turgor.   HENT:   Head: Normocephalic.   Nose: Nose normal.   Mouth/Throat: Oropharynx is clear and moist.   Eyes: Conjunctivae and EOM are normal. Pupils are equal, round, and reactive to light.   Neck: Normal range of motion.   Mild paravertebral tenderness.  No point tenderness.   Cardiovascular: Normal rate, normal heart sounds and intact distal pulses.    Irregular at times   Pulmonary/Chest: Effort normal and breath sounds normal. No respiratory distress.   Abdominal: Soft. Bowel sounds are normal. There is no tenderness.   Musculoskeletal: Normal range of motion. He exhibits tenderness  (tender left upper arm with moderate bruising noted.  Unable to raise overhead due to pain.  Moderate tenderness on palpaton left anterior knee.  No swelling or redness.  2+ pitting edema in the left lower leg.  Mild edema in right lower leg.).   Neurological: He is alert and oriented to person, place, and time.   Moderate weakness in both legs.   Skin: Skin is warm and dry.   Psychiatric: He has a normal mood and affect.       Procedures         ED Course  ED Course      10:52 PM  The pt appears frail and dehydrated.  He has poor skin turgor.  His tongue is dry and furrowed.  His creatinine is elevated at 1.9.  No old labs for comparison.  Will hydrate with a bolus of saline.  His CT head and c-spine show no acute abnormality.  His xrays shows no fractures.  Awaiting UA as the pt has not been able to void yet.  Unable to doppler his left leg tonight due to no one available to do the study tonight.  I tried to have the RN stand him for orthostatics but he is too weak to stand.  I think he warrants admission for hydration and to get the doppler on his left leg.  I think he will need rehab at discharge.  Spoke with Dr. Roberson who agrees to admit.  Recent Results (from the past 24 hour(s))   Comprehensive Metabolic Panel    Collection Time: 04/30/18  8:26 PM   Result Value Ref Range    Glucose 140 (H) 70 - 100 mg/dL    BUN 35 (H) 9 - 23 mg/dL    Creatinine 1.90 (H) 0.60 - 1.30 mg/dL    Sodium 140 132 - 146 mmol/L    Potassium 3.8 3.5 - 5.5 mmol/L    Chloride 106 99 - 109 mmol/L    CO2 28.0 20.0 - 31.0 mmol/L    Calcium 8.8 8.7 - 10.4 mg/dL    Total Protein 6.5 5.7 - 8.2 g/dL    Albumin 3.50 3.20 - 4.80 g/dL    ALT (SGPT) 15 7 - 40 U/L    AST (SGOT) 16 0 - 33 U/L    Alkaline Phosphatase 104 (H) 25 - 100 U/L    Total Bilirubin 0.6 0.3 - 1.2 mg/dL    eGFR Non African Amer 34 (L) >60 mL/min/1.73    Globulin 3.0 gm/dL    A/G Ratio 1.2 (L) 1.5 - 2.5 g/dL    BUN/Creatinine Ratio 18.4 7.0 - 25.0    Anion Gap 6.0 3.0 -  11.0 mmol/L   CBC Auto Differential    Collection Time: 04/30/18  8:26 PM   Result Value Ref Range    WBC 7.73 3.50 - 10.80 10*3/mm3    RBC 3.14 (L) 4.20 - 5.76 10*6/mm3    Hemoglobin 9.3 (L) 13.1 - 17.5 g/dL    Hematocrit 30.2 (L) 38.9 - 50.9 %    MCV 96.2 80.0 - 99.0 fL    MCH 29.6 27.0 - 31.0 pg    MCHC 30.8 (L) 32.0 - 36.0 g/dL    RDW 13.6 11.3 - 14.5 %    RDW-SD 48.1 37.0 - 54.0 fl    MPV 10.0 6.0 - 12.0 fL    Platelets 175 150 - 450 10*3/mm3    Neutrophil % 76.6 (H) 41.0 - 71.0 %    Lymphocyte % 7.0 (L) 24.0 - 44.0 %    Monocyte % 16.0 (H) 0.0 - 12.0 %    Eosinophil % 0.3 0.0 - 3.0 %    Basophil % 0.1 0.0 - 1.0 %    Immature Grans % 0.3 0.0 - 0.6 %    Neutrophils, Absolute 5.92 1.50 - 8.30 10*3/mm3    Lymphocytes, Absolute 0.54 (L) 0.60 - 4.80 10*3/mm3    Monocytes, Absolute 1.24 (H) 0.00 - 1.00 10*3/mm3    Eosinophils, Absolute 0.02 0.00 - 0.30 10*3/mm3    Basophils, Absolute 0.01 0.00 - 0.20 10*3/mm3    Immature Grans, Absolute 0.02 0.00 - 0.03 10*3/mm3   Troponin    Collection Time: 04/30/18  8:26 PM   Result Value Ref Range    Troponin I 0.040 (H) <=0.039 ng/mL   POC Troponin, Rapid    Collection Time: 04/30/18  8:37 PM   Result Value Ref Range    Troponin I 0.01 0.00 - 0.07 ng/mL   BNP    Collection Time: 04/30/18  9:56 PM   Result Value Ref Range    .0 (H) 0.0 - 100.0 pg/mL     Note: In addition to lab results from this visit, the labs listed above may include labs taken at another facility or during a different encounter within the last 24 hours. Please correlate lab times with ED admission and discharge times for further clarification of the services performed during this visit.    XR Tibia Fibula 2 View Left   Final Result   1.  Minimal DJD of knee.   2.  Knee joint effusion.   3.  Soft tissue swelling.   4.  No dislocation or acute fracture of tibia or fibula.      THIS DOCUMENT HAS BEEN ELECTRONICALLY SIGNED BY PALOMA ROMAN MD      XR Knee 1 or 2 View Left   Final Result   1.  Soft tissue  "swelling.   2.  Joint effusion.   3.  Minimal DJD.   4.  No dislocation or acute fracture visualized.      THIS DOCUMENT HAS BEEN ELECTRONICALLY SIGNED BY PALOMA ROMAN MD      XR Humerus Left   Final Result   1.  DJD of left glenohumeral and acromioclavicular joints.   2.  Mild soft tissue swelling.   3.  No dislocation or acute fracture visualized.      THIS DOCUMENT HAS BEEN ELECTRONICALLY SIGNED BY PALOMA ROMAN MD      CT Head Without Contrast   Final Result   1.  Mild cerebral and cerebellar atrophy.   2.  No acute intracranial hemorrhage.   3.  Microvascular ischemic disease of supratentorial brain.      THIS DOCUMENT HAS BEEN ELECTRONICALLY SIGNED BY PALOMA ROMAN MD      CT Cervical Spine Without Contrast   Final Result   1.  No acute fracture visualized.   2.  Mild anterolisthesis C6-C7, C7-T1, T1-T2, uncertain chronicity.   3.  Multilevel DDD and facet DJD.   4.  Multilevel acquired spinal stenosis.   5.  Suspect C6-C7 disc bulging.   6.  Mild right maxillary sinusitis.      THIS DOCUMENT HAS BEEN ELECTRONICALLY SIGNED BY PALOMA ROMAN MD      XR Chest 1 View   Final Result   1.  Suspect mild cardiomegaly.   2.  No pneumonia or CHF.      THIS DOCUMENT HAS BEEN ELECTRONICALLY SIGNED BY PALOMA ROMAN MD        Vitals:    04/30/18 2002 04/30/18 2005 04/30/18 2150 04/30/18 2155   BP:  123/67 121/76    Pulse: 82   84   Resp: 18      Temp: 98.5 °F (36.9 °C)      TempSrc: Oral      SpO2: 99%  94%    Weight: 72.6 kg (160 lb)      Height: 185.4 cm (73\")        Medications   sodium chloride 0.9 % bolus 1,000 mL (not administered)     ECG/EMG Results (last 24 hours)     Procedure Component Value Units Date/Time    ECG 12 Lead [[id number]] Collected:  04/30/18 2029     Updated:  04/30/18 2030                    MDM    Final diagnoses:   Dehydration   Elevated serum creatinine   Generalized weakness   Impaired mobility and activities of daily living   Multiple contusions            Federico Galvez, " PA  04/30/18 7204

## 2018-05-01 NOTE — PROGRESS NOTES
[facility] Medicine Services  PROGRESS NOTE    Patient Name: Hector Mayfield Jr.  : 1931  MRN: [id number]    Date of Admission: 2018  Length of Stay: 0  Primary Care Physician: Terrence Salazar MD    Subjective   Subjective     CC:  F/u generalized weakness     HPI:  Patient reports that he was unable to stand with assist earlier today.  Continues to note aching and discomfort to his left side and a subjective feeling of weakness on that side but he does note overall weakness.  Has lost 5 pounds over the past month due to decreased appetite.  No fevers or chills.  Denies difficulty urinating.  Denies a previous history of kidney impairment or known history of chronic kidney disease.  Wife at bedside.  They report that his peripheral edema is far worse than his typical and that he has had asymmetric left greater than right peripheral edema for only the past few weeks.    Review of Systems  Gen- No fevers, chills  CV- No chest pain, palpitations  Resp- No cough, dyspnea  GI- No N/V/D, abd pain    Otherwise ROS is negative except as mentioned in the HPI.    Objective   Objective     Vital Signs:   Temp:  [98.2 °F (36.8 °C)-98.5 °F (36.9 °C)] 98.2 °F (36.8 °C)  Heart Rate:  [70-84] 70  Resp:  [16-18] 16  BP: (110-130)/(60-80) 126/65     Physical Exam:  Constitutional: No acute distress, awake, alert, chronically ill-appearing  HENT: NCAT, mucous membranes moist  Respiratory: Clear to auscultation bilaterally, respiratory effort normal   Cardiovascular: RRR, no murmurs, rubs, or gallops, palpable pedal pulses bilaterally  Gastrointestinal: Positive bowel sounds, soft, nontender, nondistended  Musculoskeletal: 3+ left ankle pitting edema, 1+ right ankle pitting edema  Psychiatric: Appropriate affect, cooperative  Neurologic: Oriented x 3, strength symmetric in all extremities but 4/5, Cranial Nerves grossly intact to confrontation, speech clear  Skin: No rashes    Results  Reviewed:  I have personally reviewed current lab, radiology, and data and agree.      Results from last 7 days  Lab Units 05/01/18  0321 04/30/18 2026   WBC 10*3/mm3 6.50 7.73   HEMOGLOBIN g/dL 8.4* 9.3*   HEMATOCRIT % 27.0* 30.2*   PLATELETS 10*3/mm3 158 175       Results from last 7 days  Lab Units 05/01/18  1240 05/01/18  0321 04/30/18 2026   SODIUM mmol/L  --  143 140   POTASSIUM mmol/L  --  3.4* 3.8   CHLORIDE mmol/L  --  111* 106   CO2 mmol/L  --  27.0 28.0   BUN mg/dL  --  29* 35*   CREATININE mg/dL  --  1.70* 1.90*   GLUCOSE mg/dL  --  96 140*   CALCIUM mg/dL  --  8.3* 8.8   ALT (SGPT) U/L  --   --  15   AST (SGOT) U/L  --   --  16   TROPONIN I ng/mL 0.052* 0.062* 0.040*     Estimated Creatinine Clearance: 32.8 mL/min (by C-G formula based on SCr of 1.7 mg/dL (H)).  BNP   Date Value Ref Range Status   04/30/2018 302.0 (H) 0.0 - 100.0 pg/mL Final     Comment:     Results may be falsely decreased if patient taking Biotin.     No results found for: PHART    Microbiology Results Abnormal     None          Imaging Results (last 24 hours)     Procedure Component Value Units Date/Time    XR Tibia Fibula 2 View Left [[id number]] Collected:  04/30/18 2101     Updated:  04/30/18 2206    Narrative:       EXAM:    XR Left Tibia and Fibula, 2 Views    CLINICAL HISTORY:    86 years old, male; Pain; Lower leg; Left; Additional info: Fall, left lower   leg pain    TECHNIQUE:    Frontal and lateral views of the left tibia and fibula.    COMPARISON:    No relevant prior studies available.    FINDINGS:    Bones/joints:  Minimal DJD of knee.  Knee joint effusion.  No dislocation or   acute fracture of tibia or fibula.  Small tibial and patellar osteophyte   formation.  Minimal osteophytes at dorsal aspect of talus and navicular bones.    Soft tissues:  Soft tissue swelling.  No radiodense foreign body.    Vasculature:  Scattered atherosclerotic placques are seen along some vessels.      Impression:       1.  Minimal DJD of  knee.  2.  Knee joint effusion.  3.  Soft tissue swelling.  4.  No dislocation or acute fracture of tibia or fibula.    THIS DOCUMENT HAS BEEN ELECTRONICALLY SIGNED BY PALOMA ROMAN MD    XR Knee 1 or 2 View Left [[id number]] Collected:  04/30/18 2057     Updated:  04/30/18 2200    Narrative:       EXAM:    XR Left Knee, 1 or 2 views    CLINICAL HISTORY:    86 years old, male; Pain; Knee; Left; Additional info: Left knee pain S/P fall    TECHNIQUE:    Frontal and/or lateral views of the left knee.    COMPARISON:    No relevant prior studies available.    FINDINGS:    Bones/joints:  Joint effusion.  Small osteophytes of patella and tibia.    Minimal DJD.  No dislocation or acute fracture visualized.    Soft tissues:  Soft tissue swelling.  No radiodense foreign body.    Vasculature:  Scattered atherosclerotic placques are seen along some vessels.      Impression:       1.  Soft tissue swelling.  2.  Joint effusion.  3.  Minimal DJD.  4.  No dislocation or acute fracture visualized.    THIS DOCUMENT HAS BEEN ELECTRONICALLY SIGNED BY PALOMA ROMAN MD    XR Humerus Left [[id number]] Collected:  04/30/18 2056     Updated:  04/30/18 2158    Narrative:       EXAM:    XR Left Humerus, 2 or More Views    CLINICAL HISTORY:    86 years old, male; Pain; Upper arm; Left; Additional info: Fall, left upper   arm pain    TECHNIQUE:    Frontal and lateral views of the left humerus.    COMPARISON:    No relevant prior studies available.    FINDINGS:    Bones/joints:  DJD of left glenohumeral and acromioclavicular joints.  No   dislocation.  No acute fracture visualized.  No significant elbow joint   effusion visualized.    Soft tissues:  Mild soft tissue swelling.  No radiodense foreign body.      Impression:       1.  DJD of left glenohumeral and acromioclavicular joints.  2.  Mild soft tissue swelling.  3.  No dislocation or acute fracture visualized.    THIS DOCUMENT HAS BEEN ELECTRONICALLY SIGNED BY PALOMA ROMAN MD    CT  Head Without Contrast [[id number]] Collected:  04/30/18 2016     Updated:  04/30/18 2154    Narrative:       EXAM:    CT Head Without Intravenous Contrast    CLINICAL HISTORY:    86 years old, male; Injury or trauma; Fall; Initial encounter; Blunt trauma   (contusions or hematomas); Additional info: Syncope    TECHNIQUE:    Axial computed tomography images of the head/brain without intravenous   contrast.  All CT scans at this facility use one or more dose reduction   techniques, viz.: automated exposure control; ma/kV adjustment per patient size   (including targeted exams where dose is matched to indication; i.e. head); or   iterative reconstruction technique.    Coronal and sagittal reformatted images were created and reviewed.    COMPARISON:    No relevant prior studies available.    FINDINGS:    Brain:  Mild cerebral and cerebellar atrophy.  No acute intracranial   hemorrhage.  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  No midline shift of the brain, acute mass effect, or   downward herniation.  The gray-white matter differentiation pattern is   preserved.    Ventricles:  No ventriculomegaly.    Bones/joints:  DJD of atlantoaxial joint.  No acute fracture of calvarium.    In regards to complete findings of the cervical spine, please refer to specific   CT Cervical Spine report today.    Soft tissues:  No significant soft tissue swelling of the scalp.    Vasculature:  Scattered atherosclerotic placques are seen along some vessels.    Sinuses:  No definite acute sinusitis, as visualized.    Mastoid air cells:  Unremarkable as visualized.  No mastoid effusion.    Orbits:  Visualized orbits are unremarkable.    Nasal cavity/septum:  Slight deviation of nasal septum.    Dental:  Patient is nearly edentulous.      Impression:       1.  Mild cerebral and cerebellar atrophy.  2.  No acute  intracranial hemorrhage.  3.  Microvascular ischemic disease of supratentorial brain.    THIS DOCUMENT HAS BEEN ELECTRONICALLY SIGNED BY PALOMA ROMAN MD    CT Cervical Spine Without Contrast [[id number]] Collected:  04/30/18 2059     Updated:  04/30/18 2146    Narrative:       EXAM:    CT Cervical Spine Without Intravenous Contrast    CLINICAL HISTORY:    86 years old, male; Injury or trauma; Fall; Initial encounter; Blunt trauma;   Additional info: Neck pain S/P fall    TECHNIQUE:    Axial computed tomography images of the cervical spine without intravenous   contrast.  All CT scans at this facility use one or more dose reduction   techniques, viz.: automated exposure control; ma/kV adjustment per patient size   (including targeted exams where dose is matched to indication; i.e. head); or   iterative reconstruction technique.    Coronal and sagittal reformatted images were created and reviewed.    COMPARISON:    No relevant prior studies available.    FINDINGS:    Limitations:  Motion artifact does somewhat limit the sensitivity of this   examination.    Vertebrae:  No acute fracture visualized.  Mild anterolisthesis C6-C7, C7-T1,   T1-T2, of uncertain chronicity.  DJD atlantoaxial joint.  Degenerative benign   cystic changes at odontoid.  Osteophytes are scattered along the spine.    Thickening of transverse ligament bordering the odontoid.  Multilevel facet   joint DJD.  No dislocation.    Discs/spinal canal/neural foramina:  Multilevel degenerative disc narrowing   bordered by osteophytes at C3-C4, C4-C5, C5-C6, C6-C7, C7-T1, T1-T2.    Multifactorial multilevel acquired spinal stenosis related to osteophytes and   subluxation (at some levels) causing narrowing of several lateral neural   foramina and spinal canal.  Suspect C6-C7 disc bulging.    Soft tissues:  Soft tissue swelling of the nasal turbinates, which could   relate to allergic rhinitis.  There is no significant    prevertebral/paravertebral soft  tissue swelling.    Vasculature:  Scattered atherosclerotic placques are seen along some vessels.    Sinuses:  Mild right maxillary sinusitis.    Mastoid air cells:  Visualized mastoids are clear.    Dental:  Patient is nearly edentulous.    Nasal cavity/septum:  Mild deviation of the nasal septum.    Lung apices:  Biapical scarring.  Wedge-shaped infiltrate/atelectasis or   scarring at the apex RUL.    Other findings:  Airway is satisfactory.        Impression:       1.  No acute fracture visualized.  2.  Mild anterolisthesis C6-C7, C7-T1, T1-T2, uncertain chronicity.  3.  Multilevel DDD and facet DJD.  4.  Multilevel acquired spinal stenosis.  5.  Suspect C6-C7 disc bulging.  6.  Mild right maxillary sinusitis.    THIS DOCUMENT HAS BEEN ELECTRONICALLY SIGNED BY PALOMA ROMAN MD    XR Chest 1 View [[id number]] Collected:  04/30/18 2017     Updated:  04/30/18 2119    Narrative:       EXAM:    XR Chest, 1 View    CLINICAL HISTORY:    86 years old, male; Signs and symptoms; Other: Syncope; Additional info:   Syncope, generalized weakness    TECHNIQUE:    Frontal view of the chest.    COMPARISON:    CR - XR CHEST PA AND LATERAL 2013-08-10 19:29    FINDINGS:    Lungs:  No acute infiltrates.  No pneumonia or CHF.  Old granulomatous   disease lung.    Pleural space:  No pleural effusion or pneumothorax.    Heart:  Suboptimal inspiratory effort restricts accurate heart size   evaluation, and it appears somewhat accentuated technically.  However, suspect   mild cardiomegaly.    Mediastinum:  No acute mediastinal abnormality compared to prior study.    Bones/joints:  Minimal scoliosis.  Degenerative changes of the spine.  Mild   DJD of bilateral acromioclavicular and glenohumeral joints.    Vasculature:  Mild aortic tortuosity.    Lymph nodes:  Calcified granuloma at right tracheobronchial angle, (azygos   lymph node region) from old granulomatous disease.      Impression:       1.  Suspect mild cardiomegaly.  2.  No  pneumonia or CHF.    THIS DOCUMENT HAS BEEN ELECTRONICALLY SIGNED BY PALOMA ROMAN MD             I have reviewed the medications.    Assessment/Plan   Assessment / Plan     Hospital Problem List     * (Principal)Generalized weakness    HTN (hypertension)    CHF (congestive heart failure)    Elevated troponin    Hyperglycemia    Elevated serum creatinine    Normocytic anemia    Spinal stenosis    Dehydration    Bilateral lower extremity edema    PVC (premature ventricular contraction)        Brief Hospital Course to date:  Hector Mayfield Jr. is a 86 y.o. male with a past medical history of chronic heart failure, BPH, hypertension, chronic constipation, and non-Hodgkin's lymphoma who presented to the [facility] emergency department late on 4/30 with progressive generalized weakness for the past month associated with recurrent falls without significant trauma, loss of consciousness, or injury.  Patient also reported increasing peripheral edema and evaluation the ER disclosed mildly elevated BNP.  Patient was admitted to observation status for further evaluation and supportive care.      Assessment & Plan:    Progressive Generalized weakness with frequent falls  Soft Tissue injuries due to fall with left-sided pain  -No evidence of acute or significant injuries on imaging  -PT/OT, discussed case with case management at multidisciplinary rounds, anticipate need for short term rehab at discharge   --Pt unable to stand today with OT    Chronic heart failure, data deficient  - Echo pending   - No pulmonary edema on admission CXR, will hold on diuresis for peripheral edema until Echo report complete    Elevated troponin  -No evidence of ACS, ? Related to renal dysfunction, f/u echo    Acute kidney injury versus chronic kidney disease  -Repeat BMP in AM as pt has received gentle hydration  -Unknown baseline creatinine  - Check urine studies, Renal ultrasound, request outside labs from Dr. Salazar  - Strict  I/O    Bilateral lower extremity edema, Asymmetric  - Duplex negative for DVT, f/u echo  - No proteinuria on UA, No evidence of hepatic dysfunction     Normocytic anemia of chronic disease  - Repeat CBC in AM    Hypokalemia  - Check Mg in AM, repeat K in AM    Hypertension  - BP stable    DVT Prophylaxis:  SQ heparin  CODE STATUS: Full Code    Disposition: I expect the patient to be discharged to short term rehab in to be determined days.    I believe that Mr Mayfield meets inpatient criteria due to high risk for morbidity/mortality if his recent, progressive, and rapid loss of functional status is not appropriately addressed.  Patient is unable to stand today under his own power and thus is unable to perform activities of daily living.  In the setting of a history of chronic heart failure, advanced kidney disease (unknown if acute or chronic), and worsening peripheral edema, he warrants an urgent and comprehensive diagnostic evaluation that requires greater than 2 midnights of a hospital stay.    Electronically signed by Gil Cochran MD, 05/01/18, 3:19 PM.

## 2018-05-01 NOTE — THERAPY EVALUATION
Acute Care - Occupational Therapy Initial Evaluation  [facility]     Patient Name: Hector Mayfield Jr.  : 1931  MRN: [id number]  Today's Date: 2018  Onset of Illness/Injury or Date of Surgery: 18  Date of Referral to OT: 18  Referring Physician: ANGEL Jaramillo    Admit Date: 2018       ICD-10-CM ICD-9-CM   1. Dehydration E86.0 276.51   2. Elevated serum creatinine R79.89 790.99   3. Generalized weakness R53.1 780.79   4. Impaired mobility and activities of daily living Z74.09 799.89   5. Multiple contusions T07.XXXA 924.8   6. Impaired mobility and ADLs Z74.09 799.89     Patient Active Problem List   Diagnosis   • HTN (hypertension)   • CHF (congestive heart failure)   • Elevated troponin   • Hyperglycemia   • Elevated serum creatinine   • Normocytic anemia   • Spinal stenosis   • Generalized weakness   • Dehydration   • Bilateral lower extremity edema   • PVC (premature ventricular contraction)     Past Medical History:   Diagnosis Date   • Congestive heart failure (CHF)    • Hypertension    • Non Hodgkin's lymphoma      Past Surgical History:   Procedure Laterality Date   • HERNIA REPAIR     • PROSTATE SURGERY            OT ASSESSMENT FLOWSHEET (last 72 hours)      Occupational Therapy Evaluation     Row Name 18 1417                   OT Evaluation Time/Intention    Subjective Information complains of;weakness;fatigue;pain;swelling  -TB        Document Type evaluation  -TB        Mode of Treatment individual therapy  -TB        Patient Effort adequate  -TB        Symptoms Noted During/After Treatment fatigue;increased pain  -TB        Comment All imaging (CT/XRs) (-) for acute findings, indicates widespread DDD/DJD, C6-7 bulging disc; B LE duplex pending  -TB           General Information    Patient Profile Reviewed? yes  -TB        Onset of Illness/Injury or Date of Surgery 18  -TB        Referring Physician ANGEL Jaramillo  -TB        Patient Observations alert;cooperative  -TB         Patient/Family Observations Pt spouse and dtr present at bedside and providing history  -TB        General Observations of Patient Pt rec'vd supine in bed, room air, IV heplocked  -TB        Prior Level of Function max assist:;ADL's;mod assist:;transfer  -TB        Equipment Currently Used at Home cane, straight  -TB        Pertinent History of Current Functional Problem Pt to ED with c/o progressive generalized weakness [id number]; s/p falls x2 prior to admission with c/o neck pain; B LE edema, LLE>RLE   -TB        Existing Precautions/Restrictions fall   exit alarms  -TB        Limitations/Impairments safety/cognitive  -TB        Risks Reviewed patient and family:;LOB;increased discomfort;lines disloged  -TB        Benefits Reviewed patient and family:;improve function;increase independence;increase strength;decrease pain;increase knowledge  -TB        Barriers to Rehab medically complex;previous functional deficit  -TB           Relationship/Environment    Lives With spouse  -TB        Family Caregiver if Needed child(dominic), adult  -TB           Resource/Environmental Concerns    Current Living Arrangements home/apartment/condo  -TB           Cognitive Assessment/Intervention- PT/OT    Orientation Status (Cognition) oriented to;person;place;situation  -TB        Follows Commands (Cognition) follows one step commands;75-90% accuracy  -TB        Safety Deficit (Cognitive) awareness of need for assistance;insight into deficits/self awareness  -TB           Safety Issues, Functional Mobility    Safety Issues Affecting Function (Mobility) awareness of need for assistance;insight into deficits/self awareness  -TB        Impairments Affecting Function (Mobility) endurance/activity tolerance;pain;range of motion (ROM);strength  -TB           Bed Mobility Assessment/Treatment    Bed Mobility Assessment/Treatment rolling left;rolling right;scooting/bridging;supine-sit;sit-supine  -TB        Rolling Left [place]  (Bed Mobility) maximum assist (25% patient effort);verbal cues  -TB        Rolling Right [place] (Bed Mobility) maximum assist (25% patient effort);verbal cues  -TB        Scooting/Bridging [place] (Bed Mobility) dependent (less than 25% patient effort);2 person assist  -TB        Supine-Sit [place] (Bed Mobility) maximum assist (25% patient effort);verbal cues  -TB        Sit-Supine [place] (Bed Mobility) maximum assist (25% patient effort);2 person assist  -TB        Bed Mobility, Safety Issues decreased use of legs for bridging/pushing;decreased use of arms for pushing/pulling  -TB        Assistive Device (Bed Mobility) draw sheet;bed rails  -TB           Functional Mobility    Functional Mobility- Comment Pt refused; pain, weakness  -TB           Transfer Assessment/Treatment    Comment (Transfers) Pt refused; pain, weakness  -TB           ADL Assessment/Intervention    BADL Assessment/Intervention upper body dressing;lower body dressing  -TB           Upper Body Dressing Assessment/Training    Upper Body Dressing [place] Level don;pajama/robe  -TB        Assistive Devices (Upper Body Dressing) one hand technique  -TB        Upper Body Dressing Position edge of bed sitting  -TB        Comment (Upper Body Dressing) Education initiated for L UE kaden-dressing  -TB           Lower Body Dressing Assessment/Training    Lower Body Dressing [place] Level don;doff;socks;dependent (less than 25% patient effort)  -TB        Assistive Devices (Lower Body Dressing) --   Contraindicated due to skin integrity  -TB        Lower Body Dressing Position edge of bed sitting  -TB           BADL Safety/Performance    Impairments, BADL Safety/Performance balance;endurance/activity tolerance;pain;range of motion;strength  -TB           General ROM    GENERAL ROM COMMENTS B Shoulders limited by ~45% FE, ABduction; pt holds neck in flexed position (chin to chest)  -TB           General Assessment (Manual  Muscle Testing)    General Manual Muscle Testing (MMT) Assessment upper extremity strength deficits identified  -TB        Comment, General Manual Muscle Testing (MMT) Assessment B UE and core strength deficits; B shoulders 2/5, remaining 3/5  -TB           Upper Extremity (Manual Muscle Testing)    Upper Extremity: Manual Muscle Testing (MMT) left shoulder strength deficit;right shoulder strength deficit;left elbow/forearm strength deficit;right elbow/forearm strength deficit  -TB           Left Shoulder (Manual Muscle Testing)    Left Shoulder Manual Muscle Testing (MMT) flexion;abduction  -TB           Right Shoulder (Manual Muscle Testing)    Right Shoulder Manual Muscle Testing (MMT) flexion;abduction  -TB           Motor Assessment/Interventions    Additional Documentation Therapeutic Exercise Interventions (Group)  -TB           Therapeutic Exercise    Upper Extremity Range of Motion (Therapeutic Exercise) shoulder flexion/extension, bilateral;shoulder abduction/adduction, bilateral;shoulder horizontal abduction/adduction, bilateral;shoulder internal/external rotation, bilateral;elbow flexion/extension, bilateral  -TB        Hand (Therapeutic Exercise) hand , bilateral  -TB        Exercise Type (Therapeutic Exercise) AROM (active range of motion);AAROM (active assistive range of motion)  -TB        Position (Therapeutic Exercise) seated  -TB        Sets/Reps (Therapeutic Exercise) x5  -TB        Comment (Therapeutic Exercise) Pt u/a to tolerate B Shoulder FE/ABd >90 degrees  -TB           Coping    Observed Emotional State accepting;calm;cooperative  -TB        Verbalized Emotional State acceptance  -TB           Plan of Care Review    Plan of Care Reviewed With patient;spouse;daughter  -TB           Clinical Impression (OT)    Date of Referral to OT 04/30/18  -TB        OT Diagnosis Impaired mobility and ADL  -TB        Patient/Family Goals Statement (OT Eval) Pt/family requesting assist with referal to  [facility] at d/c  -TB        Criteria for Skilled Therapeutic Interventions Met (OT Eval) yes;treatment indicated  -TB        Rehab Potential (OT Eval) fair, will monitor progress closely  -TB        Therapy Frequency (OT Eval) daily  -TB        Care Plan Review (OT) risks/benefits reviewed;evaluation/treatment results reviewed  -TB        Care Plan Review, Other Participant (OT Eval) daughter;spouse  -TB        Anticipated Equipment Needs at Discharge (OT) bedside commode;tub bench   TBD; TTB vs repair of walk-in shower in home  -TB        Anticipated Discharge Disposition (OT) inpatient rehab facility  -TB           Vital Signs    Pre Systolic BP Rehab --   RN cleared OT, VSS  -TB        O2 Delivery Post Treatment room air  -TB        Pre Patient Position Supine  -TB        Intra Patient Position Sitting  -TB        Post Patient Position Supine  -TB           Planned OT Interventions    Planned Therapy Interventions (OT Eval) BADL retraining;ROM/therapeutic exercise;transfer/mobility retraining;occupation/activity based interventions  -TB           OT Goals    Transfer Goal Selection (OT) transfer, OT goal 1  -TB        Dressing Goal Selection (OT) dressing, OT goal 1  -TB        Grooming Goal Selection (OT) grooming, OT goal 1  -TB        ROM Goal Selection (OT) ROM, OT goal 1  -TB           Transfer Goal 1 (OT)    Activity/Assistive Device (Transfer Goal 1, OT) sit-to-stand/stand-to-sit;toilet;commode, bedside without drop arms;walker, rolling  -TB        [place] Level/Cues Needed (Transfer Goal 1, OT) moderate assist (50-74% patient effort);other (see comments)   x2 person  -TB        Time Frame (Transfer Goal 1, OT) by discharge  -TB        Barriers (Transfers Goal 1, OT) pain, ROM, strength, balance  -TB           Dressing Goal 1 (OT)    Activity/Assistive Device (Dressing Goal 1, OT) upper body dressing  -TB        [place]/Cues Needed (Dressing Goal 1, OT) minimum assist (75% or more patient  effort);verbal cues required  -TB        Time Frame (Dressing Goal 1, OT) by discharge  -TB        Barriers (Dressing Goal 1, OT) pain, ROM, strength, balance  -TB           Grooming Goal 1 (OT)    Activity/Device (Grooming Goal 1, OT) oral care;wash face, hands  -TB        [place] (Grooming Goal 1, OT) minimum assist (75% or more patient effort);verbal cues required  -TB        Time Frame (Grooming Goal 1, OT) by discharge  -TB        Barriers (Grooming Goal 1, OT) pain, ROM, strength, balance  -TB           ROM Goal 1 (OT)    ROM Goal 1 (OT) Pt participates in daily gentle A/AROM per ROM HEP to support pain-free mvmt and ADL performance  -TB        Time Frame (ROM Goal 1, OT) by discharge  -TB        Barriers (ROM Goal 1, OT) pain, ROM, strength, balance  -TB           Living Environment    Home Accessibility tub/shower is not walk in;stairs to enter home   walk-in shower is out of order  -TB          User Key  (r) = Recorded By, (t) = Taken By, (c) = Cosigned By    Initials Name Effective Dates    TB Francisca Lopez, OT 06/22/15 -            Occupational Therapy Education     Title: PT OT SLP Therapies (Done)     Topic: Occupational Therapy (Done)     Point: ADL training (Done)     Description: Instruct learner(s) on proper safety adaptation and remediation techniques during self care or transfers.   Instruct in proper use of assistive devices.   Learning Progress Summary     Learner Status Readiness Method Response Comment Documented by    Patient Done Acceptance E,D VU,NR Education intitiated for benefits of OOB activity/therapy, role of OT, ROM HEP to support ADLs, and recommendation for IRF at d/c  05/01/18 1524    Family Done Acceptance E,D VU,NR Education intitiated for benefits of OOB activity/therapy, role of OT, ROM HEP to support ADLs, and recommendation for IRF at d/c TB 05/01/18 1524          Point: Home exercise program (Done)     Description: Instruct learner(s) on appropriate technique  for monitoring, assisting and/or progressing therapeutic exercises/activities.   Learning Progress Summary     Learner Status Readiness Method Response Comment Documented by    Patient Done Acceptance E,D VU,NR Education intitiated for benefits of OOB activity/therapy, role of OT, ROM HEP to support ADLs, and recommendation for IRF at d/c  05/01/18 1524    Family Done Acceptance E,D VU,NR Education intitiated for benefits of OOB activity/therapy, role of OT, ROM HEP to support ADLs, and recommendation for IRF at d/c  05/01/18 1524                      User Key     Initials Effective Dates Name Provider Type Discipline     06/22/15 -  Francisca Lopez, OT Occupational Therapist OT                  OT Recommendation and Plan  Outcome Summary/Treatment Plan (OT)  Anticipated Equipment Needs at Discharge (OT): (S) bedside commode, tub bench (TBD; TTB vs repair of walk-in shower in home)  Anticipated Discharge Disposition (OT): inpatient rehab facility  Planned Therapy Interventions (OT Eval): BADL retraining, ROM/therapeutic exercise, transfer/mobility retraining, occupation/activity based interventions  Therapy Frequency (OT Eval): daily  Plan of Care Review  Plan of Care Reviewed With: patient, spouse, daughter  Plan of Care Reviewed With: patient, spouse, daughter  Outcome Summary: OT IE completed. Pt presents with significant generalized pain, B LE edema, strength/balance deficits limiting mobility and ADL participation. Max Ax2 bed mobility and dependent LB dressing. Education initiated for HEP. OT to follow. Recommend IRF at d/c for best outcome; to support family goal of return to home with spouse/dtr assist.           Outcome Measures     Row Name 05/01/18 7387             How much help from another is currently needed...    Putting on and taking off regular lower body clothing? 1  -TB      Bathing (including washing, rinsing, and drying) 2  -TB      Toileting (which includes using toilet bed pan or  urinal) 1  -TB      Putting on and taking off regular upper body clothing 2  -TB      Taking care of personal grooming (such as brushing teeth) 3  -TB      Eating meals 3  -TB      Score 12  -TB         Functional Assessment    Outcome Measure Options AM-PAC 6 Clicks Daily Activity (OT)  -TB        User Key  (r) = Recorded By, (t) = Taken By, (c) = Cosigned By    Initials Name Provider Type    TB Francisca Lopez, OT Occupational Therapist          Time Calculation:   OT Start Time: 1417    Therapy Charges for Today     Code Description Service Date Service Provider Modifiers Qty    18094788794  OT EVAL MOD COMPLEXITY 4 5/1/2018 Francisca Lopez OT GO 1    [id number]  OT THERAPEUTIC ACT EA 15 MIN 5/1/2018 Francisca Lopez OT GO 1               Francisca Lopez OT  5/1/2018

## 2018-05-01 NOTE — PLAN OF CARE
Problem: Fall Risk (Adult)  Goal: Identify Related Risk Factors and Signs and Symptoms  Outcome: Ongoing (interventions implemented as appropriate)   05/01/18 0242   Fall Risk (Adult)   Related Risk Factors (Fall Risk) age-related changes;fatigue/slow reaction;fear of falling;gait/mobility problems;history of falls;sleep pattern alteration;slippery/uneven surfaces;bladder function altered;inadequate lighting;polypharmacy     Goal: Absence of Fall  Outcome: Ongoing (interventions implemented as appropriate)      Problem: Patient Care Overview  Goal: Plan of Care Review  Outcome: Ongoing (interventions implemented as appropriate)   05/01/18 0242   Coping/Psychosocial   Plan of Care Reviewed With patient;spouse   Plan of Care Review   Progress no change   OTHER   Outcome Summary Pt admitted from ED with dehydration, falls, and not being able to walk, VSS, plan to ultrasound BLE later today to r/o DVT.     Goal: Individualization and Mutuality  Outcome: Ongoing (interventions implemented as appropriate)    Goal: Discharge Needs Assessment  Outcome: Ongoing (interventions implemented as appropriate)    Goal: Interprofessional Rounds/Family Conf  Outcome: Ongoing (interventions implemented as appropriate)      Problem: Fluid Volume Deficit (Adult)  Goal: Identify Related Risk Factors and Signs and Symptoms  Outcome: Ongoing (interventions implemented as appropriate)    Goal: Optimal Fluid Balance  Outcome: Ongoing (interventions implemented as appropriate)

## 2018-05-01 NOTE — PROGRESS NOTES
"Adult Nutrition  Assessment/PES    Patient Name:  Hector Mayfield Jr.  YOB: 1931  MRN: [id number]  Admit Date:  4/30/2018    Assessment Date:  5/1/2018    Comments:            Adult Nutrition Assessment     Row Name 05/01/18 1101       Nutrition Prescription PO    Current PO Diet Regular       PO Evaluation    Number of Days PO Intake Evaluated Insufficient Data   NO MEALS RECORDED YET    Row Name 05/01/18 1100       Labs/Procedures/Meds    Lab Results Reviewed reviewed   BNP       Physical Findings    Gastrointestinal other (see comments)   WDL PER NURSING DOCUMENTATION    Skin other (see comments)   WDL PER NURSING DOCUMENTATION    Row Name 05/01/18 1059       Anthropometrics    Height 188 cm (74\")    Current Weight Method measured   SLING/ WEIGHTS MAY BE SKEWED BY FLUID & DEHYDRATION.    Weight 74.4 kg (164 lb 1.6 oz)       Ideal Body Weight (IBW)    Ideal Body Weight (IBW) (kg) 87.66    % Ideal Body Weight 84.92       Usual Body Weight (UBW)    Usual Body Weight 72.6 kg (160 lb)    % Usual Body Weight 102.56       Body Mass Index (BMI)    BMI (kg/m2) 21.11       IBW Adjustment, Para/Tetraplegia    5% Adjustment, Para (IBW) 83.28    10% Adjustment, Para (IBW) 78.89    10% Adjustment, Tetra (IBW) 78.89    15% Adjustment, Tetra (IBW) 74.51    Row Name 05/01/18 1055       Nutrition/Diet History    Typical Food/Fluid Intake WIFE REPORTS PT'S APPETITE & ITAKE OF FOOD HAS BEEN ABOUT 50% OF USUAL FOR ABOUT 3 WEEKS. SHE REPORTS NO NOTICEABLE WEIGHT LOSS WITH UBW AROUND 160 #. SHE PROVIDED FOOD PREFS & TOLERANCES & REPORTS PT WOULD MOST LIKELY DRINK A SUPPLEMENT IF NEEDED.      Row Name 05/01/18 1052       Reason for Assessment    Reason For Assessment identified at risk by screening criteria    Diagnosis other (see comments)   GENERALIZED WEAKNESS, ELEVATED TROPONIN, HYPERGLYCEMIA, ELEVATED CREATININE, ANEMIA, DEHYDRATION, B/L LEG EMEMA. HX OF HTN, HF, SPINAL STENOSIS, IRREGULAR HEART BEAT, BPH, NHL " (REMISSION)    Identified At Risk by Screening Criteria MST SCORE 2+          Problem/Interventions:        Problem 1     Row Name 05/01/18 1102       Nutrition Diagnoses Problem 1    Problem 1 Predicted Suboptimal Intake    Etiology (related to) Other (comment)   CLINICAL CONDITION    Signs/Symptoms (evidenced by) Report of Mnimal PO Intake                    Intervention Goal     Row Name 05/01/18 1102       Intervention Goal    General Nutrition support treatment    PO Establish PO            Nutrition Intervention     Row Name 05/01/18 1102       Nutrition Intervention    RD/Tech Action Advise alternate selection;Advise available snack;Interview for preference;Menu adjusted;Encourage intake;Recommend/ordered;Follow Tx progress;Care plan reviewd    Recommended/Ordered Supplement            Nutrition Prescription     Row Name 05/01/18 1102       Nutrition Prescription PO    PO Prescription Begin/change supplement    Supplement Boost Plus    Supplement Frequency 2 times a day    New PO Prescription Ordered? Yes            Education/Evaluation     Row Name 05/01/18 1102       Monitor/Evaluation    Monitor Per protocol;I&O;PO intake;Supplement intake;Pertinent labs;Weight;Skin status;Symptoms        Electronically signed by:  Dorcas Boudreaux RD  05/01/18 11:03 AM

## 2018-05-01 NOTE — PLAN OF CARE
"Problem: Fall Risk (Adult)  Goal: Identify Related Risk Factors and Signs and Symptoms  Outcome: Ongoing (interventions implemented as appropriate)    Goal: Absence of Fall  Outcome: Ongoing (interventions implemented as appropriate)      Problem: Patient Care Overview  Goal: Plan of Care Review  Outcome: Ongoing (interventions implemented as appropriate)   05/01/18 1527 05/01/18 1659   Coping/Psychosocial   Plan of Care Reviewed With patient;spouse;daughter --    Plan of Care Review   Progress --  no change   OTHER   Outcome Summary --  Patient denies any pain or discomfort. Patient reports, \"I feel a lot better today.\" Patient has significant edema in LLE. Patient's vitals are stable on RA. will continue to monitor.       Problem: Fluid Volume Deficit (Adult)  Goal: Identify Related Risk Factors and Signs and Symptoms  Outcome: Ongoing (interventions implemented as appropriate)    Goal: Optimal Fluid Balance  Outcome: Ongoing (interventions implemented as appropriate)        "

## 2018-05-01 NOTE — PLAN OF CARE
Problem: Patient Care Overview  Goal: Plan of Care Review  Outcome: Ongoing (interventions implemented as appropriate)   05/01/18 5167   Coping/Psychosocial   Plan of Care Reviewed With patient;spouse;daughter   OTHER   Outcome Summary OT IE completed. Pt presents with significant generalized pain, B LE edema, strength/balance deficits limiting mobility and ADL participation. Max Ax2 bed mobility and dependent LB dressing. Education initiated for HEP. OT to follow. Recommend IRF at d/c for best outcome; to support family goal of return to home with spouse/dtr assist.

## 2018-05-01 NOTE — PROGRESS NOTES
Discharge Planning Assessment  [facility]     Patient Name: Hector Mayfield Jr.  MRN: [id number]  Today's Date: 5/1/2018    Admit Date: 4/30/2018          Discharge Needs Assessment     Row Name 05/01/18 1110       Living Environment    Lives With spouse    Current Living Arrangements home/apartment/condo    Primary Care Provided by self;spouse/significant other    Provides Primary Care For no one    Family Caregiver if Needed child(dominic), adult;spouse    Quality of Family Relationships supportive    Able to Return to Prior Arrangements yes       Resource/Environmental Concerns    Transportation Concerns car, none       Transition Planning    Patient/Family Anticipates Transition to inpatient rehabilitation facility    Patient/Family Anticipated Services at Transition rehabilitation services    Transportation Anticipated family or friend will provide       Discharge Needs Assessment    Readmission Within the Last 30 Days no previous admission in last 30 days    Concerns to be Addressed discharge planning    Equipment Currently Used at Home cane, straight    Anticipated Changes Related to Illness inability to care for self    Equipment Needed After Discharge none            Discharge Plan     Row Name 05/01/18 1112       Plan    Plan ONGOING    Patient/Family in Agreement with Plan yes    Plan Comments Met with pt and wife at bedside.  They reside in [facility].  Pt requires some assistance with ADLs.  He reports several recent falls.  Currently uses a can with ambulation.  Discussed possible STR.  He has expressed an interest in [facility].  Called referral to [facility].  Confirmed he has Medicare and supplemental insurance and Rx coverage.  CM will cont to follow for possible STR needs (vs home with HH).          Destination     No service coordination in this encounter.      Durable Medical Equipment     No service coordination in this encounter.      Dialysis/Infusion     No service coordination in this encounter.       Home Medical Care     No service coordination in this encounter.      Social Care     No service coordination in this encounter.                Demographic Summary     Row Name 05/01/18 1107       General Information    Referral Source admission list    Reason for Consult discharge planning    Preferred Language English       Contact Information    Permission Granted to Share Info With     Contact Information Obtained for             Functional Status     Row Name 05/01/18 1108       Functional Status    Usual Activity Tolerance moderate       Functional Status, IADL    Medications independent    Meal Preparation assistive person    Housekeeping assistive person    Laundry assistive person    Shopping assistive person            Psychosocial    No documentation.           Abuse/Neglect    No documentation.           Legal    No documentation.           Substance Abuse    No documentation.           Patient Forms    No documentation.         Guadalupe Cook

## 2018-05-01 NOTE — H&P
[facility] Medicine Services  HISTORY AND PHYSICAL    Patient Name: Hector Mayfield Jr.  : 1931  MRN: [id number]  Primary Care Physician: Terrence Salazar MD    Subjective   Subjective     Chief Complaint:  Generalized weakness    HPI:  Hector Mayfield Jr. is an 86 y.o. male with a past medial history significant for CHF, BPH, Hypertension, and Non Hodgkin's lymphoma (in remission since radiation in ) who presents with progressive generalized weakness over the past month. States he fell after his legs gave out 2018 then had recurrent falls in the days following. Denies head trauma or LOC. He has not been evaluated for the falls prior to arrival. He is on ASA daily but no chronic anticoagulation. Patient also complaining of bilateral lower extremity edema. Notes marked increase in left lower extremity since bout of falls. He adds complaints of neck stiffness since fall. Denies cough, congestion, fever, chest pain, SOB, abdominal pain, blood in stool, or dysuria. Will admit for further evaluation and treatment.    Emergency Department Evaluation; vitals stable. Troponin elevated to 0.04. . Blood glucose 140. BUN/Cr 35 and 1.9 respectively.  H/H 9.3 and 30.2 respectively. Imaging showed no acute changes or fractures. EKG stable.    Review of Systems       Otherwise 10-system ROS reviewed and is negative except as mentioned in the HPI.    Personal History     Past Medical History:   Diagnosis Date   • Congestive heart failure (CHF)    • Hypertension    • Non Hodgkin's lymphoma        Past Surgical History:   Procedure Laterality Date   • HERNIA REPAIR     • PROSTATE SURGERY         Family History: family history is not on file.     Social History:  reports that he has never smoked. He has never used smokeless tobacco. He reports that he does not drink alcohol or use drugs.  Social History     Social History Narrative   • No narrative on file        Medications:  Prescriptions Prior to Admission   Medication Sig Dispense Refill Last Dose   • aspirin  MG tablet Take 325 mg by mouth Daily.      • furosemide (LASIX) 40 MG tablet Take 40 mg by mouth Daily.      • metoprolol succinate XL (TOPROL-XL) 25 MG 24 hr tablet Take 12.5 mg by mouth Daily.      • potassium chloride (K-DUR,KLOR-CON) 10 MEQ CR tablet Take 10 mEq by mouth Daily.      • tamsulosin (FLOMAX) 0.4 MG capsule 24 hr capsule Take 1 capsule by mouth Every Night.          No Known Allergies    Objective   Objective     Vital Signs:   Temp:  [98.5 °F (36.9 °C)] 98.5 °F (36.9 °C)  Heart Rate:  [82-84] 84  Resp:  [18] 18  BP: (110-130)/(60-76) 110/60        Physical Exam   Constitutional: No acute distress, awake, alert  Eyes: PERRLA, sclerae anicteric, no conjunctival injection  HENT: NCAT, mucous membranes moist  Neck: Supple, no thyromegaly, no lymphadenopathy, trachea midline  Respiratory: Clear to auscultation bilaterally, nonlabored respirations   Cardiovascular: RRR, no murmurs, rubs, or gallops, palpable pedal pulses bilaterally  Gastrointestinal: Positive bowel sounds, soft, nontender, nondistended  Musculoskeletal:  no clubbing or cyanosis to extremities  BLE edema, pitting Left> right. milding tenderness to palpaiton  Psychiatric: Appropriate affect, cooperative  Neurologic: Oriented x 3, strength symmetric in all extremities, Cranial Nerves grossly intact to confrontation, speech clear  Skin: ecchymotic bruising on left arm.      Results Reviewed:  I have personally reviewed current lab, radiology, and data and agree.      Results from last 7 days  Lab Units 04/30/18 2026   WBC 10*3/mm3 7.73   HEMOGLOBIN g/dL 9.3*   HEMATOCRIT % 30.2*   PLATELETS 10*3/mm3 175       Results from last 7 days  Lab Units 04/30/18 2026   SODIUM mmol/L 140   POTASSIUM mmol/L 3.8   CHLORIDE mmol/L 106   CO2 mmol/L 28.0   BUN mg/dL 35*   CREATININE mg/dL 1.90*   GLUCOSE mg/dL 140*   CALCIUM mg/dL 8.8   ALT  (SGPT) U/L 15   AST (SGOT) U/L 16   TROPONIN I ng/mL 0.040*     Estimated Creatinine Clearance: 28.7 mL/min (by C-G formula based on SCr of 1.9 mg/dL (H)).  Brief Urine Lab Results     None        BNP   Date Value Ref Range Status   04/30/2018 302.0 (H) 0.0 - 100.0 pg/mL Final     Comment:     Results may be falsely decreased if patient taking Biotin.     No results found for: PHART  Imaging Results (last 24 hours)     Procedure Component Value Units Date/Time    XR Tibia Fibula 2 View Left [[id number]] Collected:  04/30/18 2101     Updated:  04/30/18 2206    Narrative:       EXAM:    XR Left Tibia and Fibula, 2 Views    CLINICAL HISTORY:    86 years old, male; Pain; Lower leg; Left; Additional info: Fall, left lower   leg pain    TECHNIQUE:    Frontal and lateral views of the left tibia and fibula.    COMPARISON:    No relevant prior studies available.    FINDINGS:    Bones/joints:  Minimal DJD of knee.  Knee joint effusion.  No dislocation or   acute fracture of tibia or fibula.  Small tibial and patellar osteophyte   formation.  Minimal osteophytes at dorsal aspect of talus and navicular bones.    Soft tissues:  Soft tissue swelling.  No radiodense foreign body.    Vasculature:  Scattered atherosclerotic placques are seen along some vessels.      Impression:       1.  Minimal DJD of knee.  2.  Knee joint effusion.  3.  Soft tissue swelling.  4.  No dislocation or acute fracture of tibia or fibula.    THIS DOCUMENT HAS BEEN ELECTRONICALLY SIGNED BY PALOMA ROMAN MD    XR Knee 1 or 2 View Left [[id number]] Collected:  04/30/18 2057     Updated:  04/30/18 2200    Narrative:       EXAM:    XR Left Knee, 1 or 2 views    CLINICAL HISTORY:    86 years old, male; Pain; Knee; Left; Additional info: Left knee pain S/P fall    TECHNIQUE:    Frontal and/or lateral views of the left knee.    COMPARISON:    No relevant prior studies available.    FINDINGS:    Bones/joints:  Joint effusion.  Small osteophytes of patella and  tibia.    Minimal DJD.  No dislocation or acute fracture visualized.    Soft tissues:  Soft tissue swelling.  No radiodense foreign body.    Vasculature:  Scattered atherosclerotic placques are seen along some vessels.      Impression:       1.  Soft tissue swelling.  2.  Joint effusion.  3.  Minimal DJD.  4.  No dislocation or acute fracture visualized.    THIS DOCUMENT HAS BEEN ELECTRONICALLY SIGNED BY PALOMA ROMAN MD    XR Humerus Left [[id number]] Collected:  04/30/18 2056     Updated:  04/30/18 2158    Narrative:       EXAM:    XR Left Humerus, 2 or More Views    CLINICAL HISTORY:    86 years old, male; Pain; Upper arm; Left; Additional info: Fall, left upper   arm pain    TECHNIQUE:    Frontal and lateral views of the left humerus.    COMPARISON:    No relevant prior studies available.    FINDINGS:    Bones/joints:  DJD of left glenohumeral and acromioclavicular joints.  No   dislocation.  No acute fracture visualized.  No significant elbow joint   effusion visualized.    Soft tissues:  Mild soft tissue swelling.  No radiodense foreign body.      Impression:       1.  DJD of left glenohumeral and acromioclavicular joints.  2.  Mild soft tissue swelling.  3.  No dislocation or acute fracture visualized.    THIS DOCUMENT HAS BEEN ELECTRONICALLY SIGNED BY PALOMA ROMAN MD    CT Head Without Contrast [[id number]] Collected:  04/30/18 2016     Updated:  04/30/18 2154    Narrative:       EXAM:    CT Head Without Intravenous Contrast    CLINICAL HISTORY:    86 years old, male; Injury or trauma; Fall; Initial encounter; Blunt trauma   (contusions or hematomas); Additional info: Syncope    TECHNIQUE:    Axial computed tomography images of the head/brain without intravenous   contrast.  All CT scans at this facility use one or more dose reduction   techniques, viz.: automated exposure control; ma/kV adjustment per patient size   (including targeted exams where dose is matched to indication; i.e. head); or   iterative  reconstruction technique.    Coronal and sagittal reformatted images were created and reviewed.    COMPARISON:    No relevant prior studies available.    FINDINGS:    Brain:  Mild cerebral and cerebellar atrophy.  No acute intracranial   hemorrhage.  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  No midline shift of the brain, acute mass effect, or   downward herniation.  The gray-white matter differentiation pattern is   preserved.    Ventricles:  No ventriculomegaly.    Bones/joints:  DJD of atlantoaxial joint.  No acute fracture of calvarium.    In regards to complete findings of the cervical spine, please refer to specific   CT Cervical Spine report today.    Soft tissues:  No significant soft tissue swelling of the scalp.    Vasculature:  Scattered atherosclerotic placques are seen along some vessels.    Sinuses:  No definite acute sinusitis, as visualized.    Mastoid air cells:  Unremarkable as visualized.  No mastoid effusion.    Orbits:  Visualized orbits are unremarkable.    Nasal cavity/septum:  Slight deviation of nasal septum.    Dental:  Patient is nearly edentulous.      Impression:       1.  Mild cerebral and cerebellar atrophy.  2.  No acute intracranial hemorrhage.  3.  Microvascular ischemic disease of supratentorial brain.    THIS DOCUMENT HAS BEEN ELECTRONICALLY SIGNED BY PALOMA ROMAN MD    CT Cervical Spine Without Contrast [[id number]] Collected:  04/30/18 2059     Updated:  04/30/18 2146    Narrative:       EXAM:    CT Cervical Spine Without Intravenous Contrast    CLINICAL HISTORY:    86 years old, male; Injury or trauma; Fall; Initial encounter; Blunt trauma;   Additional info: Neck pain S/P fall    TECHNIQUE:    Axial computed tomography images of the cervical spine without intravenous   contrast.  All CT scans at this facility use one or more dose reduction    techniques, viz.: automated exposure control; ma/kV adjustment per patient size   (including targeted exams where dose is matched to indication; i.e. head); or   iterative reconstruction technique.    Coronal and sagittal reformatted images were created and reviewed.    COMPARISON:    No relevant prior studies available.    FINDINGS:    Limitations:  Motion artifact does somewhat limit the sensitivity of this   examination.    Vertebrae:  No acute fracture visualized.  Mild anterolisthesis C6-C7, C7-T1,   T1-T2, of uncertain chronicity.  DJD atlantoaxial joint.  Degenerative benign   cystic changes at odontoid.  Osteophytes are scattered along the spine.    Thickening of transverse ligament bordering the odontoid.  Multilevel facet   joint DJD.  No dislocation.    Discs/spinal canal/neural foramina:  Multilevel degenerative disc narrowing   bordered by osteophytes at C3-C4, C4-C5, C5-C6, C6-C7, C7-T1, T1-T2.    Multifactorial multilevel acquired spinal stenosis related to osteophytes and   subluxation (at some levels) causing narrowing of several lateral neural   foramina and spinal canal.  Suspect C6-C7 disc bulging.    Soft tissues:  Soft tissue swelling of the nasal turbinates, which could   relate to allergic rhinitis.  There is no significant    prevertebral/paravertebral soft tissue swelling.    Vasculature:  Scattered atherosclerotic placques are seen along some vessels.    Sinuses:  Mild right maxillary sinusitis.    Mastoid air cells:  Visualized mastoids are clear.    Dental:  Patient is nearly edentulous.    Nasal cavity/septum:  Mild deviation of the nasal septum.    Lung apices:  Biapical scarring.  Wedge-shaped infiltrate/atelectasis or   scarring at the apex RUL.    Other findings:  Airway is satisfactory.        Impression:       1.  No acute fracture visualized.  2.  Mild anterolisthesis C6-C7, C7-T1, T1-T2, uncertain chronicity.  3.  Multilevel DDD and facet DJD.  4.  Multilevel acquired spinal  stenosis.  5.  Suspect C6-C7 disc bulging.  6.  Mild right maxillary sinusitis.    THIS DOCUMENT HAS BEEN ELECTRONICALLY SIGNED BY PALOMA ROMAN MD    XR Chest 1 View [[id number]] Collected:  04/30/18 2017     Updated:  04/30/18 2119    Narrative:       EXAM:    XR Chest, 1 View    CLINICAL HISTORY:    86 years old, male; Signs and symptoms; Other: Syncope; Additional info:   Syncope, generalized weakness    TECHNIQUE:    Frontal view of the chest.    COMPARISON:    CR - XR CHEST PA AND LATERAL 2013-08-10 19:29    FINDINGS:    Lungs:  No acute infiltrates.  No pneumonia or CHF.  Old granulomatous   disease lung.    Pleural space:  No pleural effusion or pneumothorax.    Heart:  Suboptimal inspiratory effort restricts accurate heart size   evaluation, and it appears somewhat accentuated technically.  However, suspect   mild cardiomegaly.    Mediastinum:  No acute mediastinal abnormality compared to prior study.    Bones/joints:  Minimal scoliosis.  Degenerative changes of the spine.  Mild   DJD of bilateral acromioclavicular and glenohumeral joints.    Vasculature:  Mild aortic tortuosity.    Lymph nodes:  Calcified granuloma at right tracheobronchial angle, (azygos   lymph node region) from old granulomatous disease.      Impression:       1.  Suspect mild cardiomegaly.  2.  No pneumonia or CHF.    THIS DOCUMENT HAS BEEN ELECTRONICALLY SIGNED BY PALOMA ROMAN MD             Assessment/Plan   Assessment / Plan     Hospital Problem List     * (Principal)Generalized weakness    Elevated troponin    Hyperglycemia    Elevated serum creatinine    Left leg swelling    Dehydration    HTN (hypertension)    CHF (congestive heart failure)    Normocytic anemia    Spinal stenosis            Assessment & Plan:  1. Generalized weakness:  - with recurrent falls. Imaging with no acute changes, although CT cervical spine does demonstrate acquired spinal stenosis and bulging at C6-C7.  - PT/OT treat and eval. Case management.  "Patient will likely need rahab at discharge.  - am labs    2. Elevated troponin:  - no chest pain or history of CAD  - suspect \"leak\" in the setting of elevated serum creatinine  - continue to trend enzymes    3. Elevated serum creatine:  - acute on chronic Vs. true CRISTO. No comparative data in records  - pre-renal secondary to dehydration  - gentle hydration with saline 50 cc/hr X 8 hours. Monitor in the setting of BLE edema  - avoid additional nephrotoxins    4. BLE edema  - no history of DVT/PE  - obtain doppler lower extremities bilaterally.  - BNP indeterminate. Obtain echocardiogram    5. Normocytic anemia:  - H/H 9.3 and 30.2. No comparative data.  - will check iron studies and occult blood    6. Congestive heart failure:   - unknown classification. On Lasix daily. Holding for now per CRISTO. Resume as tolerated.  - appears compensated. Monitor for acute change in volume status    7. PVCs  - MONITOR ON TELE.   -REPEAT EKG IN AM, CHECK MAG AND TSH. CONSIDER CARDIOLOGY CONSULT. TREND TROP            DVT prophylaxis: hold for now until venous doppler and avoid heparin or lovenox due to recent falls.     CODE STATUS:  Full Code    Admission Status:  I believe this patient meets INPATIENT status due to the need for care which can only be reasonably provided in an hospital setting such as aggressive/expedited ancillary services and/or consultation services, the necessity for IV medications, close physician monitoring and/or the possible need for procedures.  In such, I feel patient’s risk for adverse outcomes and need for care warrant INPATIENT evaluation and predict the patient’s care encounter to likely last beyond 2 midnights.      Electronically signed by Mario Jaramillo PA-C, 05/01/18, 1:51 AM.      Brief Attending Admission Attestation     I have seen and examined the patient, performing an independent face-to-face diagnostic evaluation with plan of care reviewed and developed with the advanced practice " clinician (APC).      Brief Summary Statement/HPI:   Hector Mayfield Jr. is a 86 y.o. male brought to ED by wife after frequent calls. Pt fell walking in home with lights off and landed on his left side. He now states he has pain in the left upper ext and left lower ext. He has several falls prior to this also. Pain in the left left worse for the past 2 days. And left left is weaker. Has also noted edema in the left lower ext. Creatinine in ED 1.9. Do not have baseline.        Attending Physical Exam:  Vitals:    05/01/18 0723   BP: 121/65   Pulse: 78   Resp: 16   Temp: 98.4 °F (36.9 °C)   SpO2: 97%     Gen-frail, in pain, increased flexion of cervical spine and tenderness in paraspinal region.   HEENT-atraumatic, normocephalic, PERRLA, EOMI, moist mucous membranes   CV-irregular with frequent ectopic beats noted.   Resp-CTAB, no rales, no rhonchi, no wheezing  Abd-normal BS, soft, ND, NT   Ext-3 plus edema of the left lower ext  Skin-warm, dry, no rashes or lesions noted  Neuro-A&Ox3, CN II-XII grossly intact, weakness in lower ext bilaterally, symmetrical.   Psych-anxious      Brief Assessment/Plan :  See above for further detailed assessment and plan developed with APC which I have reviewed and/or edited.      Electronically signed by Dayan Burton DO, 05/01/18, 5:18 AM.

## 2018-05-02 NOTE — PROGRESS NOTES
[facility] Medicine Services  PROGRESS NOTE    Patient Name: Hector Mayfield Jr.  : 1931  MRN: [id number]    Date of Admission: 2018  Length of Stay: 1  Primary Care Physician: Terrence Salazar MD    Subjective   Subjective     CC:  F/u generalized weakness     HPI:  Pt continues to note aching to his left side. Remains severely weak.  Has required I/O cath by nursing and hopes to avoid danielson if able. Peripheral edema stable. Does report poor oral intake of liquids for the few days prior to admission.     Review of Systems  Gen- No fevers, chills  CV- No chest pain, palpitations  Resp- No cough, dyspnea  GI- No N/V/D, abd pain    Otherwise ROS is negative except as mentioned in the HPI.    Objective   Objective     Vital Signs:   Temp:  [97.2 °F (36.2 °C)-98.7 °F (37.1 °C)] 98.7 °F (37.1 °C)  Heart Rate:  [65-83] 82  Resp:  [16-18] 16  BP: (106-133)/(60-94) 133/71     Physical Exam:  Constitutional: No acute distress, awake, alert, chronically ill-appearing  HENT: NCAT, mucous membranes moist  Respiratory: Clear to auscultation bilaterally, respiratory effort normal   Cardiovascular: RRR, no murmurs, rubs, or gallops, palpable pedal pulses bilaterally  Gastrointestinal: Positive bowel sounds, soft, nontender, nondistended  Musculoskeletal: 3+ left ankle pitting edema, 1+ right ankle pitting edema (stable)  Psychiatric: Appropriate affect, cooperative  Neurologic: Oriented x 3, strength symmetric in all extremities but 4/5, Cranial Nerves grossly intact to confrontation, speech clear  Skin: No rashes    Results Reviewed:  I have personally reviewed current lab, radiology, and data and agree.      Results from last 7 days  Lab Units 18  0321 18  202   WBC 10*3/mm3 6.59 6.50 7.73   HEMOGLOBIN g/dL 8.8* 8.4* 9.3*   HEMATOCRIT % 28.5* 27.0* 30.2*   PLATELETS 10*3/mm3 149* 158 175       Results from last 7 days  Lab Units 1828 18  1240  05/01/18  0321 04/30/18 2026   SODIUM mmol/L 145  --  143 140   POTASSIUM mmol/L 4.0  --  3.4* 3.8   CHLORIDE mmol/L 112*  --  111* 106   CO2 mmol/L 26.0  --  27.0 28.0   BUN mg/dL 22  --  29* 35*   CREATININE mg/dL 1.30  --  1.70* 1.90*   GLUCOSE mg/dL 87  --  96 140*   CALCIUM mg/dL 8.6*  --  8.3* 8.8   ALT (SGPT) U/L 23  --   --  15   AST (SGOT) U/L 31  --   --  16   TROPONIN I ng/mL  --  0.052* 0.062* 0.040*     Estimated Creatinine Clearance: 42.9 mL/min (by C-G formula based on SCr of 1.3 mg/dL).  BNP   Date Value Ref Range Status   04/30/2018 302.0 (H) 0.0 - 100.0 pg/mL Final     Comment:     Results may be falsely decreased if patient taking Biotin.     No results found for: PHART    Microbiology Results Abnormal     None          Imaging Results (last 24 hours)     Procedure Component Value Units Date/Time    US Renal Limited [[id number]] Collected:  05/02/18 1054     Updated:  05/02/18 1154    Narrative:       EXAMINATION: US RENAL LIMITED- 05/02/2018     INDICATION: E86.0-Dehydration; R79.89-Other specified abnormal findings  of blood chemistry; R53.1-Weakness; Z74.09-Other reduced mobility;  T07.XXXA-Unspecified multiple injuries, initial encounter     COMPARISON: NONE     FINDINGS:   1. The right kidney measures 9.4 x 4.7 x 4.5 cm. There is 1.3 cm of  cortical thickness. The right kidney is smooth without solid mass.     There is, however, a large upper pole cyst projecting from the upper  aspect of the right kidney with slight shaggy contours measuring 8.2 x  5.4 x 5.6 cm. Septations are not identified and no solid component is  noted. Further, there is no obstruction to the right kidney.  2. The left kidney measures 10.4 x 5.4 x 4.4 cm. There is 1.8 cm of  cortical thickness. There is no solid mass, dominant cyst or  hydronephrosis associated with the left kidney.  3. The bladder exam is unremarkable. There is a considerable volume of  urine in the bladder.       Impression:       1. Large right upper  pole renal cyst without complicating factors or  features.  2. Normal-appearing kidneys otherwise with normal size and parenchymal  complement. Renal obstruction is not identified on either side.  3. Bladder is unremarkable and mildly distended with urine.      D:  05/02/2018  E:  05/02/2018     This report was finalized on 5/2/2018 11:52 AM by Dr. Kishor West MD.           Results for orders placed during the hospital encounter of 04/30/18   Adult Transthoracic Echo Complete W/ Cont if Necessary Per Protocol    Narrative · Mild tricuspid valve regurgitation is present.  · Calculated right ventricular systolic pressure from tricuspid   regurgitation is 30 mmHg.          I have reviewed the medications.    Assessment/Plan   Assessment / Plan     Hospital Problem List     * (Principal)Generalized weakness    HTN (hypertension)    CHF (congestive heart failure)    Elevated troponin    Hyperglycemia    Elevated serum creatinine    Normocytic anemia    Spinal stenosis    Dehydration    Bilateral lower extremity edema    PVC (premature ventricular contraction)        Brief Hospital Course to date:  Hector Mayfield Jr. is a 86 y.o. male with a past medical history of chronic heart failure, BPH, hypertension, chronic constipation, and non-Hodgkin's lymphoma who presented to the [facility] emergency department late on 4/30 with progressive generalized weakness for the past month associated with recurrent falls without significant trauma, loss of consciousness, or injury.  Patient also reported increasing peripheral edema and evaluation the ER disclosed mildly elevated BNP.  Patient was admitted for further evaluation and supportive care.      Assessment & Plan:    Progressive Generalized weakness with frequent falls  Soft Tissue injuries due to fall with left-sided pain  -No evidence of acute bony injuries on imaging  -PT/OT, needs rehab at discharge, d/w CM during multidisciplinary rounds     Chronic LV  Diastolic heart failure  - Echo is normal and I do not think patient has acute heart failure. I suspect his peripheral edema is due to venous insufficiency/poor functional state   - His FeNA is suggestive of intravascular dehydration     Elevated troponin  -No evidence of ACS, ? Related to renal dysfunction, Echo normal  - As no chest pain, will defer further work-up   - Continue aspirin, check lipid panel in AM and consider adding statin    Nonoliguric Acute kidney injury, improving but remains active  - Likely prerenal azotemia, will start back IVF for 24 more hours  - Repeat BMP in AM  - FeNa less than 1%, Renal Ultrasound negative  -Unknown baseline creatinine  - Strict I/O    Bilateral lower extremity edema, Asymmetric  - Duplex negative for DVT, Echo normal   - No proteinuria , No evidence of hepatic dysfunction   - Suspect venous stasis due to venous insufficiency and poor functional state  - PT wound care for leg wraps     Normocytic anemia of chronic disease  - Hgb stable     Hypokalemia  - Resolved     Hypertension  - BP stable    DVT Prophylaxis:  SQ heparin  CODE STATUS: Full Code    Disposition: I expect the patient to be discharged to short term rehab in 1-2 days    Electronically signed by Gil Cochran MD, 05/02/18, 4:27 PM.

## 2018-05-02 NOTE — PROGRESS NOTES
Continued Stay Note  [facility]     Patient Name: Hector Mayfield Jr.  MRN: [id number]  Today's Date: 5/2/2018    Admit Date: 4/30/2018          Discharge Plan     Row Name 05/02/18 1424       Plan    Plan Comments F/u discussion with pt and family at [facility].  Per request, additional STR ref[place] called to [facility] [place]), [facility], and [facility].  CM will cont to follow for any rehab offers.                  Discharge Codes    No documentation.           Guadalupe Cook

## 2018-05-02 NOTE — PLAN OF CARE
"Problem: Patient Care Overview  Goal: Plan of Care Review  Outcome: Ongoing (interventions implemented as appropriate)   05/02/18 8449   Coping/Psychosocial   Plan of Care Reviewed With patient;spouse   Plan of Care Review   Progress improving   OTHER   Outcome Summary Presents w/ evolving sympt incl. BLE swelling/signif. pain (\">10/10 w/Dep.EOB\"; neg. for ? DVT), decr HGB (8.4), ELEV Trop, Crt. & electrolytes,hypergly,irreg HR,recent falls (x2), decr. ROM/ strength & impaired funct mobil; only katarzyna 2 1/2 min. EOB activ. but decl. SPT to [facility] or [facility], d/t pain;now has new order for premed w/ Tramadol;will move to lift room & mobil. as able next session          "

## 2018-05-02 NOTE — THERAPY TREATMENT NOTE
Acute Care - Occupational Therapy Treatment Note  [facility]     Patient Name: Hector Mayfield Jr.  : 1931  MRN: [id number]  Today's Date: 2018  Onset of Illness/Injury or Date of Surgery: 18  Date of Referral to OT: 18  Referring Physician: ANGEL Hilton    Admit Date: 2018       ICD-10-CM ICD-9-CM   1. Dehydration E86.0 276.51   2. Elevated serum creatinine R79.89 790.99   3. Generalized weakness R53.1 780.79   4. Impaired mobility and activities of daily living Z74.09 799.89   5. Multiple contusions T07.XXXA 924.8   6. Impaired mobility and ADLs Z74.09 799.89   7. Impaired functional mobility, balance, gait, and endurance Z74.09 V49.89     Patient Active Problem List   Diagnosis   • HTN (hypertension)   • CHF (congestive heart failure)   • Elevated troponin   • Hyperglycemia   • Elevated serum creatinine   • Normocytic anemia   • Spinal stenosis   • Generalized weakness   • Dehydration   • Bilateral lower extremity edema   • PVC (premature ventricular contraction)     Past Medical History:   Diagnosis Date   • Congestive heart failure (CHF)    • Hypertension    • Non Hodgkin's lymphoma      Past Surgical History:   Procedure Laterality Date   • HERNIA REPAIR     • PROSTATE SURGERY         Therapy Treatment          Rehabilitation Treatment Summary     Row Name 18 1333             Treatment Time/Intention    Discipline occupational therapist  -KF      Document Type therapy note (daily note)  -KF      Subjective Information complains of;pain;fatigue;weakness  -KF      Mode of Treatment occupational therapy  -KF      Patient/Family Observations daughter present throughout session and encouraged Pt participation  -KF      Care Plan Review patient/other agree to care plan;risks/benefits reviewed;care plan/treatment goals reviewed  -KF      Care Plan Review, Other Participant(s) daughter  -KF      Patient Effort adequate  -KF      Comment due to pain treatment remained in supine with  HOB elevated prior to pt transport to new room  -KF      Existing Precautions/Restrictions fall;other (see comments)   pain with sitting and movements of L UE  -KF      Treatment Considerations/Comments L UE pain, movements in pain free range   -KF      Patient Response to Treatment tolerated  -KF      Recorded by [KF] Fatoumata Coles, OT 05/02/18 1359 05/02/18 1359      Row Name 05/02/18 1333             Vital Signs    Pre Systolic BP Rehab 126  -KF      Pre Treatment Diastolic BP 81   RN cleared vitals stable  -KF      Pretreatment Heart Rate (beats/min) 71  -KF      Posttreatment Heart Rate (beats/min) 74  -KF      Pre SpO2 (%) 95  -KF      O2 Delivery Pre Treatment room air  -KF      O2 Delivery Intra Treatment room air  -KF      Post SpO2 (%) 97  -KF      O2 Delivery Post Treatment room air  -KF      Pre Patient Position Supine  -KF      Intra Patient Position Supine  -KF      Post Patient Position Supine  -KF      Recorded by [KF] Fatoumata Coles, OT 05/02/18 1359 05/02/18 1359      Row Name 05/02/18 1333             Cognitive Assessment/Intervention    Additional Documentation Cognitive Assessment/Intervention (Group)  -KF      Recorded by [KF] Fatoumata Coles, OT 05/02/18 1359 05/02/18 1359      Row Name 05/02/18 1333             Cognitive Assessment/Intervention- PT/OT    Affect/Mental Status (Cognitive) WFL  -KF      Orientation Status (Cognition) oriented x 4  -KF      Follows Commands (Cognition) follows one step commands;over 90% accuracy  -KF      Safety Deficit (Cognitive) moderate deficit;insight into deficits/self awareness;safety precautions awareness  -KF      Personal Safety Interventions fall prevention program maintained;muscle strengthening facilitated  -KF      Recorded by [KF] Fatoumata Coles, OT 05/02/18 1359 05/02/18 1359      Row Name 05/02/18 1333             Safety Issues, Functional Mobility    Safety Issues Affecting Function (Mobility) safety precaution awareness;awareness of  need for assistance  -KF      Impairments Affecting Function (Mobility) pain  -KF      Comment, Safety Issues/Impairments (Mobility) unable due to pain with repositioning and movements  -KF      Recorded by [KF] Fatoumata Coles, OT 05/02/18 1359 05/02/18 1359      Row Name 05/02/18 1333             Bed Mobility Assessment/Treatment    Comment (Bed Mobility) pain increased with movements declined bed mobility today  -KF      Recorded by [KF] Fatoumata Coles, OT 05/02/18 1359 05/02/18 1359      Row Name 05/02/18 1333             Functional Mobility    Functional Mobility- Ind. Level unable to perform  -KF      Recorded by [KF] Fatoumata Coles, OT 05/02/18 1359 05/02/18 1359      Row Name 05/02/18 1333             Transfer Assessment/Treatment    Comment (Transfers) declined  -KF      Recorded by [KF] Fatoumata Coles, OT 05/02/18 1359 05/02/18 1359      Row Name 05/02/18 1333             ADL Assessment/Intervention    BADL Assessment/Intervention other (see comments)   declined grooming ADLs   -KF      Recorded by [KF] Fatoumata Coles, OT 05/02/18 1359 05/02/18 1359      Row Name 05/02/18 1333             Motor Skills Assessment/Interventions    Additional Documentation Therapeutic Exercise (Group);Therapeutic Exercise Interventions (Group)  -KF      Recorded by [KF] Fatoumata Coles, OT 05/02/18 1402 05/02/18 1402      Row Name 05/02/18 1333             Therapeutic Exercise    Therapeutic Exercise supine, upper extremities  -KF      Additional Documentation Therapeutic Exercise (Row)  -KF      Recorded by [KF] Fatoumata Coles, OT 05/02/18 1402 05/02/18 1402      Row Name 05/02/18 1333             Upper Extremity Supine Therapeutic Exercise    Performed, Supine Upper Extremity (Therapeutic Exercise) shoulder flexion/extension;shoulder abduction/adduction;elbow flexion/extension;other (see comments)   digit flexion/ext; supination/pronation  -KF      Exercise Type, Supine Upper Extremity (Therapeutic  Exercise) AROM (active range of motion);AAROM (active assistive range of motion)  -KF      Expected Outcomes, Supine Upper Extremity (Therapeutic Exercise) improve functional tolerance, self-care activity;improve motor control;improve performance, BADLs;strengthen normal movement patterns  -KF      Sets/Reps Detail, Supine Upper Extremity (Therapeutic Exercise) 1/15  -KF      Comment, Supine Upper Extremity (Therapeutic Exercise) AAROM L UE in pain free range; R UE AROM  -KF      Recorded by [KF] Fatoumata Coles, OT 05/02/18 1402 05/02/18 1402      Row Name 05/02/18 1333             Positioning and Restraints    Pre-Treatment Position in bed  -KF      Post Treatment Position bed  -KF      In Bed notified nsg;supine;call light within reach;fowlers;encouraged to call for assist;exit alarm on;with family/caregiver;legs elevated  -KF      Recorded by [KF] Fatoumata Coles, OT 05/02/18 1402 05/02/18 1402      Row Name 05/02/18 1333             Pain Assessment    Additional Documentation Pain Scale: Numbers Pre/Post-Treatment (Group)  -KF      Recorded by [KF] Fatoumata Coles, OT 05/02/18 1402 05/02/18 1402      Row Name 05/02/18 1333             Pain Scale: Numbers Pre/Post-Treatment    Pain Scale: Numbers, Pretreatment 3/10  -KF      Pain Scale: Numbers, Post-Treatment 3/10  -KF      Pain Location - Side Bilateral  -KF      Pain Intervention(s) Medication (See MAR)  -KF      Recorded by [KF] Fatoumata Coles, OT 05/02/18 1402 05/02/18 1402      Row Name 05/02/18 1333             Plan of Care Review    Plan of Care Reviewed With patient;daughter  -KF      Recorded by [KF] Fatoumata Coles, OT 05/02/18 1402 05/02/18 1402      Row Name 05/02/18 1333             Outcome Summary/Treatment Plan (OT)    Daily Summary of Progress (OT) progress towards functional goals is fair  -KF      Barriers to Overall Progress (OT) pain  -KF      Plan for Continued Treatment (OT) cont IPOT per POC  -KF      Recorded by [KF] Fatoumata  MARY Coles, OT 05/02/18 1402 05/02/18 1402        User Key  (r) = Recorded By, (t) = Taken By, (c) = Cosigned By    Initials Name Effective Dates Discipline    FRANCIS HERNANDEZ Sanket, OT 04/03/18 -  OT               Occupational Therapy Education     Title: PT OT SLP Therapies (Active)     Topic: Occupational Therapy (Active)     Point: ADL training (Active)     Description: Instruct learner(s) on proper safety adaptation and remediation techniques during self care or transfers.   Instruct in proper use of assistive devices.   Learning Progress Summary     Learner Status Readiness Method Response Comment Documented by    Patient Active Acceptance E,D NR  DM 05/02/18 1249     Done Acceptance E,D VU,NR Education intitiated for benefits of OOB activity/therapy, role of OT, ROM HEP to support ADLs, and recommendation for IRF at d/c  05/01/18 1524    Family Done Acceptance E,D VU,NR Education intitiated for benefits of OOB activity/therapy, role of OT, ROM HEP to support ADLs, and recommendation for IRF at d/c  05/01/18 1524    Significant Other Active Acceptance E,D NR   05/02/18 1249          Point: Home exercise program (Active)     Description: Instruct learner(s) on appropriate technique for monitoring, assisting and/or progressing therapeutic exercises/activities.   Learning Progress Summary     Learner Status Readiness Method Response Comment Documented by    Patient Active Acceptance E NR Pt educated on HEP for AAROM and AAROM BUE to improve activity tolerance for ADLs while in supine  05/02/18 1439     Active Acceptance E,D NR  DM 05/02/18 1249     Done Acceptance E,D VU,NR Education intitiated for benefits of OOB activity/therapy, role of OT, ROM HEP to support ADLs, and recommendation for IRF at d/c  05/01/18 1524    Family Active Acceptance E NR Pt educated on HEP for AAROM and AAROM BUE to improve activity tolerance for ADLs while in supine  05/02/18 1439     Done Acceptance E,D VU,NR Education  intitiated for benefits of OOB activity/therapy, role of OT, ROM HEP to support ADLs, and recommendation for IRF at d/c TB 05/01/18 1524    Significant Other Active Acceptance E,D NR  DM 05/02/18 1249                      User Key     Initials Effective Dates Name Provider Type Discipline    TB 06/22/15 -  Francisca Lopez, OT Occupational Therapist OT    DM 06/19/15 -  Bambi Cruz, PT Physical Therapist PT     04/03/18 -  Fatoumata Coles, OT Occupational Therapist OT                OT Recommendation and Plan  Outcome Summary/Treatment Plan (OT)  Daily Summary of Progress (OT): progress towards functional goals is fair  Barriers to Overall Progress (OT): pain  Plan for Continued Treatment (OT): cont IPOT per POC  Daily Summary of Progress (OT): progress towards functional goals is fair  Plan of Care Review  Plan of Care Reviewed With: patient  Plan of Care Reviewed With: patient  Outcome Summary: Pt treatment in supine due to increased pain with mobility and repositioning. Pt tolerated BUE AAROM and AROM HEP within pain free range 15 reps. Cont IPOT per POC to improve activity tolerance for ADLs.        Outcome Measures     Row Name 05/02/18 1333 05/02/18 1045 05/01/18 1417       How much help from another person do you currently need...    Turning from your back to your side while in flat bed without using bedrails?  -- 2  -DM  --    Moving from lying on back to sitting on the side of a flat bed without bedrails?  -- 2  -DM  --    Moving to and from a bed to a chair (including a wheelchair)?  -- 1  -DM  --    Standing up from a chair using your arms (e.g., wheelchair, bedside chair)?  -- 1  -DM  --    Climbing 3-5 steps with a railing?  -- 1  -DM  --    To walk in hospital room?  -- 1  -DM  --    AM-PAC 6 Clicks Score  -- 8  -DM  --       How much help from another is currently needed...    Putting on and taking off regular lower body clothing? 1  -KF  -- 1  -TB    Bathing (including washing, rinsing,  and drying) 2  -KF  -- 2  -TB    Toileting (which includes using toilet bed pan or urinal) 1  -KF  -- 1  -TB    Putting on and taking off regular upper body clothing 2  -KF  -- 2  -TB    Taking care of personal grooming (such as brushing teeth) 3  -KF  -- 3  -TB    Eating meals 3  -KF  -- 3  -TB    Score 12  -KF  -- 12  -TB       Functional Assessment    Outcome Measure Options AM-PAC 6 Clicks Daily Activity (OT)  -KF AM-PAC 6 Clicks Basic Mobility (PT)  -DM AM-PAC 6 Clicks Daily Activity (OT)  -TB      User Key  (r) = Recorded By, (t) = Taken By, (c) = Cosigned By    Initials Name Provider Type    TB Francisca Lopez, OT Occupational Therapist    IAN Cruz, PT Physical Therapist    FRANCIS Coles OT Occupational Therapist           Time Calculation:         Time Calculation- OT     Row Name 05/02/18 1438             Time Calculation- OT    OT Start Time 1333  -KF      Total Timed Code Minutes- OT 12 minute(s)  -KF      OT Received On 05/02/18  -      OT Goal Re-Cert Due Date 05/11/18  -        User Key  (r) = Recorded By, (t) = Taken By, (c) = Cosigned By    Initials Name Provider Type    FRANCIS Coles OT Occupational Therapist           Therapy Charges for Today     Code Description Service Date Service Provider Modifiers Qty    [id number]  OT THERAPEUTIC ACT EA 15 MIN 5/2/2018 Fatoumata Coles OT GO 1               Fatoumata Coles OT  5/2/2018

## 2018-05-02 NOTE — NURSING NOTE
"Pt states that he is having a lot of trouble voiding. RN assisted pt to side of bed to try and help facilitate voiding in urinal. Pt states that he normally sits on a \"raised toilet seat at home\" to void, but that he is \"too weak to stand on his own here.\" Pt attempted several times to void and only voided 225. Pt c/o pain in lower ABD stating that there is \"a lot of pressure on my bladder.\" RN bladder scanned pt with 706 mL. RN let pt attempt to void again with no success. RN educated on the complications of not voiding and letting bladder stay full/get gaspar. Pt states that it is okay to I&O cath him. RN maintained sterility and I&O cath pt and obtained 600 mL of urine. Pt states that his ABD \"feels much better now\" and is resting comfortably. RN will continue to monitor pt.   "

## 2018-05-02 NOTE — THERAPY EVALUATION
Acute Care - Physical Therapy Initial Evaluation  [facility]     Patient Name: Hector Mayfield Jr.  : 1931  MRN: [id number]  Today's Date: 2018   Onset of Illness/Injury or Date of Surgery: 18  Date of Referral to PT: 18  Referring Physician: ANGEL Hilton      Admit Date: 2018    Visit Dx:     ICD-10-CM ICD-9-CM   1. Dehydration E86.0 276.51   2. Elevated serum creatinine R79.89 790.99   3. Generalized weakness R53.1 780.79   4. Impaired mobility and activities of daily living Z74.09 799.89   5. Multiple contusions T07.XXXA 924.8   6. Impaired mobility and ADLs Z74.09 799.89   7. Impaired functional mobility, balance, gait, and endurance Z74.09 V49.89     Patient Active Problem List   Diagnosis   • HTN (hypertension)   • CHF (congestive heart failure)   • Elevated troponin   • Hyperglycemia   • Elevated serum creatinine   • Normocytic anemia   • Spinal stenosis   • Generalized weakness   • Dehydration   • Bilateral lower extremity edema   • PVC (premature ventricular contraction)     Past Medical History:   Diagnosis Date   • Congestive heart failure (CHF)    • Hypertension    • Non Hodgkin's lymphoma      Past Surgical History:   Procedure Laterality Date   • HERNIA REPAIR     • PROSTATE SURGERY          PT ASSESSMENT (last 12 hours)      Physical Therapy Evaluation     Row Name 18 1045          PT Evaluation Time/Intention    Subjective Information complains of;weakness;pain;swelling  -DM     Document Type evaluation  -DM     Mode of Treatment individual therapy;physical therapy  -DM     Patient Effort fair  -DM     Symptoms Noted During/After Treatment fatigue;increased pain  -DM     Comment nsg.called for painMedOrder(MD orderedTramadol);will move to lift room later today;BLE venous duplex done 5-1: neg for ?DVT  -DM     Row Name 18 1042          General Information    Patient Profile Reviewed? yes  -DM     Onset of Illness/Injury or Date of Surgery 18  -DM      "Referring Physician JORDY HiltonC  -DM     Patient Observations alert;cooperative;agree to therapy   nsg:INIT.REF.toGetUpThisAMd/tLEpain;CMcalledPT;req.seeStat  -DM     Patient/Family Observations spouse states pt.hasn't moved LUE s/p fall 4-8,d/t bruising/pain;CM & WIFE giving pt. \"pep talk\"to coop. w/ PT&mobil. efforts;noPainMedOrder;PT req.Nsg obtain(nsg notifiedPT s/p rx. thatTramadol now OK'd);nsgBroughtBSC/reqSPT&void,then UIC  -DM     General Observations of Patient sup.in bed;tele,RA,IV hep locked,SCUDS OFF,LE's swollen,waff flora; ISSUED WAFF CUSH,LIFT SLING, BEDPAN  -DM     Prior Level of Function max assist:;gait;ADL's   mod Asst.bed mob/transf.  -DM     Equipment Currently Used at Home cane, straight  -DM     Pertinent History of Current Functional Problem onset prog.gen.weakness x 1 mo.; fell x 2 (4-8 & subseq;bruising LUE,W/ inabil. to move limb s/p; reports BLE edema (L>R), neck pain/stiffness; dx: dehy, HBP,INCR TROP, Hypergly, incr.Crt, Anemia (HGB 8.4),IRREG HR; XRAYS L TIB/FIB: min DJD knee,jt.effusion, soft tissue swelling, w/o fx; BLE venous duplex 5-1: neg for ? DVT; H/O narrowing S.C  -DM     Existing Precautions/Restrictions fall;other (see comments)   premed (Tramadol);PREV. FALLS X 2;neg.fxs or ?DVT;EXIT ALARM  -DM     Limitations/Impairments safety/cognitive   pt to move to lift room  -DM     Risks Reviewed patient and family:;LOB;dizziness;increased discomfort;change in vital signs;lines disloged  -DM     Benefits Reviewed patient and family:;improve function;increase independence;increase strength;increase balance;decrease pain;increase knowledge  -DM     Barriers to Rehab medically complex;previous functional deficit  -DM     Row Name 05/02/18 3750          Relationship/Environment    Primary Source of Support/Comfort spouse  -DM     Lives With spouse  -DM     Family Caregiver if Needed child(dominic), adult  -DM     Row Name 05/02/18 0558          Resource/Environmental Concerns    Current " Living Arrangements home/apartment/condo  -DM     Resource/Environmental Concerns --  -DM     Home Accessibility Concerns bathroom not accessible  -DM     Transportation Concerns car, none  -DM     Row Name 05/02/18 1045          Cognitive Assessment/Interventions    Additional Documentation Cognitive Assessment/Intervention (Group)  -DM     Row Name 05/02/18 1045          Cognitive Assessment/Intervention- PT/OT    Affect/Mental Status (Cognitive) anxious  -DM     Orientation Status (Cognition) oriented to;person;place;situation  -DM     Follows Commands (Cognition) follows one step commands;75-90% accuracy  -DM     Safety Deficit (Cognitive) ability to follow commands;awareness of need for assistance;insight into deficits/self awareness  -DM     Personal Safety Interventions fall prevention program maintained;gait belt;nonskid shoes/slippers when out of bed  -DM     Row Name 05/02/18 1045          Safety Issues, Functional Mobility    Safety Issues Affecting Function (Mobility) awareness of need for assistance;insight into deficits/self awareness;safety precaution awareness  -DM     Impairments Affecting Function (Mobility) balance;coordination;endurance/activity tolerance;motor planning;pain;postural/trunk control;strength  -DM     Row Name 05/02/18 1045          Bed Mobility Assessment/Treatment    Bed Mobility Assessment/Treatment rolling left;rolling right;supine-sit;sit-supine  -DM     Rolling Left [place] (Bed Mobility) verbal cues;maximum assist (25% patient effort)   rolledMult.x's(bedpanPlaced/doffed;slingPlaced;pads repl,)  -DM     Rolling Right [place] (Bed Mobility) verbal cues;maximum assist (25% patient effort)  -DM     Scooting/Bridging [place] (Bed Mobility) verbal cues;dependent (less than 25% patient effort);2 person assist   to HOB X 2 W/draw sheet  -DM     Supine-Sit [place] (Bed Mobility) verbal cues;maximum assist (25% patient effort);2 person assist   crying out loudly  "w/pain;insisting\"layMeDown\";PLB/Relax.VC's  -DM     Sit-Supine [place] (Bed Mobility) verbal cues;dependent (less than 25% patient effort)   crying out w/ pain \"off theScale\"  -DM     Bed Mobility, Safety Issues decreased use of arms for pushing/pulling;decreased use of legs for bridging/pushing;impaired trunk control for bed mobility  -DM     Assistive Device (Bed Mobility) bed rails;draw sheet;head of bed elevated  -DM     Comment (Bed Mobility) --   calmingCues;pt becomingLouder;unableToVoid w/Urinal;ret.Sup.  -DM     Row Name 05/02/18 1045          Transfer Assessment/Treatment    Comment (Transfers) decl. SPT toBSC orChair;crying w/BLEpain  -DM     Row Name 05/02/18 1045          Gait/Stairs Assessment/Training    Comment (Gait/Stairs) decl. gt w/Rwx d/t pain  -DM     Row Name 05/02/18 1045          General ROM    GENERAL ROM COMMENTS BLE's sommer.50-75% d/tSeverePain;nsg calling forPainMedOrder  -DM     Row Name 05/02/18 1045          General Assessment (Manual Muscle Testing)    General Manual Muscle Testing (MMT) Assessment lower extremity strength deficits identified  -DM     Comment, General Manual Muscle Testing (MMT) Assessment B hips 2-/5;unable to katarzyna knee/ankle assess.d/t pain  -DM     Row Name 05/02/18 1045          Motor Assessment/Intervention    Additional Documentation Balance (Group);Balance Interventions (Group);Therapeutic Exercise (Group);Therapeutic Exercise Interventions (Group)  -DM     Row Name 05/02/18 1045          Therapeutic Exercise    Additional Documentation Therapeutic Exercise (Row)  -DM     Row Name 05/02/18 1045          Therapeutic Exercise    Lower Extremity Range of Motion (Therapeutic Exercise) hip flexion/extension, bilateral;hip abduction/adduction, bilateral;hip internal/external rotation, bilateral;knee flexion/extension, bilateral;ankle dorsiflexion/plantar flexion, bilateral   QS,HS,GS,H.Slides,SAQ;MaxA hip abd,H.slides;restsBtwn(Pain)  -DM     Exercise Type " "(Therapeutic Exercise) AAROM (active assistive range of motion);AROM (active range of motion);isometric contraction, static  -DM     Position (Therapeutic Exercise) supine  -DM     Sets/Reps (Therapeutic Exercise) 10  -DM     Comment (Therapeutic Exercise) W/O katarzyna.BKFO,bridging  -DM     Row Name 05/02/18 1045          Balance    Balance static sitting balance;dynamic sitting balance  -DM     Row Name 05/02/18 1045          Static Sitting Balance    Level of [place] (Unsupported Sitting, Static Balance) moderate assist, 50 to 74% patient effort   pushing back,d/t pain/pressure LE'S w/resting feet on floor  -DM     Sitting Position (Unsupported Sitting, Static Balance) sitting on edge of bed  -DM     Time Able to Maintain Position (Unsupported Sitting, Static Balance) 2 to 3 minutes  -DM     Comment (Unsupported Sitting, Static Balance) protesting entire time:\"lay me down;can't take this;not getting up\";nsg present;concurred w/ def.BSC,ret. to sup.,& attempting voiding  -DM     Row Name 05/02/18 1045          Dynamic Sitting Balance    Level of [place], Reaches Outside Midline (Sitting, Dynamic Balance) maximal assist, 25 to 49% patient effort  -DM     Sitting Position, Reaches Outside Midline (Sitting, Dynamic Balance) sitting on edge of bed  -DM     Comment, Reaches Outside Midline (Sitting, Dynamic Balance) scooting F/B W/Draw pad;incrWB through UE'S  -DM     Row Name 05/02/18 1045          Pain Assessment    Additional Documentation Pain Scale: Numbers Pre/Post-Treatment (Group)  -DM     Row Name 05/02/18 1045          Pain Scale: Numbers Pre/Post-Treatment    Pain Scale: Numbers, Pretreatment 4/10  -DM     Pain Scale: Numbers, Post-Treatment 10/10  -DM     Pain Location - Side Bilateral  -DM     Pain Location - Orientation lower  -DM     Pain Location extremity  -DM     Pre/Post Treatment Pain Comment ONLY TYLENOL ordered  -DM     Pain Intervention(s) Medication (See MAR);Repositioned;Rest  -DM     Row " Name 05/02/18 1045          Plan of Care Review    Plan of Care Reviewed With patient;spouse  -DM     Row Name 05/02/18 1045          Physical Therapy Clinical Impression    Date of Referral to PT 05/01/18  -DM     PT Diagnosis (PT Clinical Impression) impaired funct mobil  -DM     Criteria for Skilled Interventions Met (PT Clinical Impression) yes;treatment indicated  -DM     Pathology/Pathophysiology Noted (Describe Specifically for Each System) cardiovascular  -DM     Impairments Found (describe specific impairments) gait, locomotion, and balance  -DM     Rehab Potential (PT Clinical Summary) fair, will monitor progress closely  -DM     Care Plan Review (PT) care plan/treatment goals reviewed;patient/other agree to care plan  -DM     Row Name 05/02/18 1045          Vital Signs    Pre Systolic BP Rehab 122  -DM     Pre Treatment Diastolic BP 73  -DM     Post Systolic BP Rehab 126  -DM     Post Treatment Diastolic BP 81  -DM     Pretreatment Heart Rate (beats/min) 65  -DM     Intratreatment Heart Rate (beats/min) 71  -DM     Posttreatment Heart Rate (beats/min) 70  -DM     Pre SpO2 (%) 99  -DM     O2 Delivery Pre Treatment room air  -DM     Intra SpO2 (%) 97  -DM     O2 Delivery Intra Treatment room air  -DM     Post SpO2 (%) 98  -DM     O2 Delivery Post Treatment room air  -DM     Pre Patient Position Supine  -DM     Intra Patient Position Sitting  -DM     Post Patient Position Supine  -DM     Row Name 05/02/18 1045          Physical Therapy Goals    Bed Mobility Goal Selection (PT) bed mobility, PT goal 1  -DM     Transfer Goal Selection (PT) transfer, PT goal 1  -DM     Gait Training Goal Selection (PT) gait training, PT goal 1  -DM     Row Name 05/02/18 1045          Bed Mobility Goal 1 (PT)    Activity/Assistive Device (Bed Mobility Goal 1, PT) rolling to left;rolling to right;sit to supine/supine to sit  -DM     [place] Level/Cues Needed (Bed Mobility Goal 1, PT) maximum assist (25-49% patient effort)   -DM     Time Frame (Bed Mobility Goal 1, PT) long term goal (LTG);1 week  -DM     Row Name 05/02/18 1045          Transfer Goal 1 (PT)    Activity/Assistive Device (Transfer Goal 1, PT) sit-to-stand/stand-to-sit;bed-to-chair/chair-to-bed;walker, rolling  -DM     [place] Level/Cues Needed (Transfer Goal 1, PT) maximum assist (25-49% patient effort)  -DM     Time Frame (Transfer Goal 1, PT) long term goal (LTG);1 week  -DM     Row Name 05/02/18 1045          Gait Training Goal 1 (PT)    Activity/Assistive Device (Gait Training Goal 1, PT) gait (walking locomotion);assistive device use;walker, rolling   stable VS  -DM     [place] Level (Gait Training Goal 1, PT) maximum assist (25-49% patient effort)  -DM     Distance (Gait Goal 1, PT) 10  -DM     Time Frame (Gait Training Goal 1, PT) long term goal (LTG);1 week  -DM     Row Name 05/02/18 1045          Patient Education Goal (PT)    Activity (Patient Education Goal, PT) HEP exer  -DM     [place]/Cues/Accuracy (Memory Goal 2, PT) demonstrates adequately;verbalizes understanding  -DM     Time Frame (Patient Education Goal, PT) long term goal (LTG);1 week  -DM     Row Name 05/02/18 1045          Positioning and Restraints    Pre-Treatment Position in bed  -DM     Post Treatment Position bed   mult phone calls to track down port.lift (not located)  -DM     In Bed notified nsg;supine;call light within reach;encouraged to call for assist;exit alarm on;with family/caregiver   attempted raisingHOBfor Lunch;cryingOut w/LegPain  -DM     Row Name 05/02/18 1045          Living Environment    Home Accessibility stairs to enter home;tub/shower is not walk in   W.I.shower not working;uses tub/show  -DM       User Key  (r) = Recorded By, (t) = Taken By, (c) = Cosigned By    Initials Name Provider Type    IAN Cruz, PT Physical Therapist          Physical Therapy Education     Title: PT OT SLP Therapies (Active)     Topic: Physical Therapy (Active)     Point:  "Mobility training (Active)    Learning Progress Summary     Learner Status Readiness Method Response Comment Documented by    Patient Active Acceptance E,D NR  DM 05/02/18 1249    Significant Other Active Acceptance E,D NR  DM 05/02/18 1249          Point: Home exercise program (Active)    Learning Progress Summary     Learner Status Readiness Method Response Comment Documented by    Patient Active Acceptance ED NR  DM 05/02/18 1249    Significant Other Active Acceptance E,D NR  DM 05/02/18 1249          Point: Body mechanics (Active)    Learning Progress Summary     Learner Status Readiness Method Response Comment Documented by    Patient Active Acceptance E,D NR  DM 05/02/18 1249    Significant Other Active Acceptance E,D NR  DM 05/02/18 1249          Point: Precautions (Active)    Learning Progress Summary     Learner Status Readiness Method Response Comment Documented by    Patient Active Acceptance ED NR  DM 05/02/18 1249    Significant Other Active Acceptance ED NR  DM 05/02/18 1249                      User Key     Initials Effective Dates Name Provider Type Discipline     06/19/15 -  Bambi Cruz, PT Physical Therapist PT                PT Recommendation and Plan  Anticipated Discharge Disposition (PT): inpatient rehab facility  Planned Therapy Interventions (PT Eval): balance training, bed mobility training, gait training, home exercise program, patient/family education, strengthening, transfer training  Therapy Frequency (PT Clinical Impression): daily  Outcome Summary/Treatment Plan (PT)  Anticipated Discharge Disposition (PT): inpatient rehab facility  Plan of Care Reviewed With: patient, spouse  Progress: improving  Outcome Summary: Presents w/ evolving sympt incl. BLE swelling/signif. pain (\">10/10 w/Dep.EOB\"; neg. for ? DVT), decr HGB (8.4), ELEV Trop, Crt. & electrolytes,hypergly,irreg HR,recent falls (x2), decr. ROM/ strength & impaired funct mobil; only katarzyna 2 1/2 min. EOB activ. but decl. " SPT to [facility] or [facility], d/t pain;now has new order for premed w/ Tramadol;will move to lift room & mobil. as able next session            Outcome Measures     Row Name 05/02/18 1045 05/01/18 1417          How much help from another person do you currently need...    Turning from your back to your side while in flat bed without using bedrails? 2  -DM  --     Moving from lying on back to sitting on the side of a flat bed without bedrails? 2  -DM  --     Moving to and from a bed to a chair (including a wheelchair)? 1  -DM  --     Standing up from a chair using your arms (e.g., wheelchair, bedside chair)? 1  -DM  --     Climbing 3-5 steps with a railing? 1  -DM  --     To walk in hospital room? 1  -DM  --     AM-PAC 6 Clicks Score 8  -DM  --        How much help from another is currently needed...    Putting on and taking off regular lower body clothing?  -- 1  -TB     Bathing (including washing, rinsing, and drying)  -- 2  -TB     Toileting (which includes using toilet bed pan or urinal)  -- 1  -TB     Putting on and taking off regular upper body clothing  -- 2  -TB     Taking care of personal grooming (such as brushing teeth)  -- 3  -TB     Eating meals  -- 3  -TB     Score  -- 12  -TB        Functional Assessment    Outcome Measure Options AM-PAC 6 Clicks Basic Mobility (PT)  -DM AM-PAC 6 Clicks Daily Activity (OT)  -TB       User Key  (r) = Recorded By, (t) = Taken By, (c) = Cosigned By    Initials Name Provider Type    TB Francisca Lopez, OT Occupational Therapist    IAN Cruz, PT Physical Therapist           Time Calculation:         PT Charges     Row Name 05/02/18 1259             Time Calculation    Start Time 1045  -DM      PT Received On 05/02/18  -DM      PT Goal Re-Cert Due Date 05/12/18  -DM         Time Calculation- PT    Total Timed Code Minutes- PT 52 minute(s)  -DM        User Key  (r) = Recorded By, (t) = Taken By, (c) = Cosigned By    Initials Name Provider Type    IAN Cruz, PT  Physical Therapist          Therapy Charges for Today     Code Description Service Date Service Provider Modifiers Qty    06630714334 HC PT EVAL MOD COMPLEXITY 4 5/2/2018 Bambi Cruz, PT GP 1    91096521540 HC PT THER PROC EA 15 MIN 5/2/2018 Bambi Cruz, PT GP 3          PT G-Codes  Outcome Measure Options: AM-PAC 6 Clicks Basic Mobility (PT)      Bambi Cruz, PT  5/2/2018

## 2018-05-02 NOTE — PLAN OF CARE
Problem: Fall Risk (Adult)  Goal: Identify Related Risk Factors and Signs and Symptoms  Outcome: Ongoing (interventions implemented as appropriate)    Goal: Absence of Fall  Outcome: Ongoing (interventions implemented as appropriate)      Problem: Patient Care Overview  Goal: Plan of Care Review  Outcome: Ongoing (interventions implemented as appropriate)   05/02/18 0297   Coping/Psychosocial   Plan of Care Reviewed With patient;spouse   Plan of Care Review   Progress no change   OTHER   Outcome Summary Pt c/o pain in upper legs and feet. Pt c/o not being able to void and has post void residual that needed I&O cathed. VSS. Will continue to monitor.       Problem: Fluid Volume Deficit (Adult)  Goal: Identify Related Risk Factors and Signs and Symptoms  Outcome: Ongoing (interventions implemented as appropriate)    Goal: Optimal Fluid Balance  Outcome: Ongoing (interventions implemented as appropriate)      Problem: Skin Injury Risk (Adult)  Goal: Identify Related Risk Factors and Signs and Symptoms  Outcome: Ongoing (interventions implemented as appropriate)    Goal: Skin Health and Integrity  Outcome: Ongoing (interventions implemented as appropriate)

## 2018-05-02 NOTE — PLAN OF CARE
Problem: Patient Care Overview  Goal: Plan of Care Review  Outcome: Ongoing (interventions implemented as appropriate)   05/02/18 6467   Coping/Psychosocial   Plan of Care Reviewed With patient   Plan of Care Review   Progress improving   OTHER   Outcome Summary Pt treatment in supine due to increased pain with mobility and repositioning. Pt tolerated BUE AAROM and AROM HEP within pain free range 15 reps. Cont IPOT per POC to improve activity tolerance for ADLs.

## 2018-05-03 NOTE — PROGRESS NOTES
[facility] Medicine Services  PROGRESS NOTE    Patient Name: Hector Mayfield Jr.  : 1931  MRN: [id number]    Date of Admission: 2018  Length of Stay: 2  Primary Care Physician: Terrence Salazar MD    Subjective   Subjective     CC:  F/u generalized weakness     HPI:  Pt resting in bed at time of visit, dtr at bedside. Denies orthopnea/SOA. LE swelling improving per pt. Making gains with PT. Still no appetite, making effort with Boost. Danielson placed earlier 2nd to urine retention, followed by Dr Hamilton outpt Urology at [facility]. No bm this admit., hx of constipation. No abd pain/N/V. Answered several questions dtr had regarding current issues/treatment plan. Verbalized understanding, no further questions at this time.     Review of Systems  Gen- No fevers, chills  CV- No chest pain, palpitations  Resp- No cough, dyspnea  GI- + constipationNo N/V/D, abd pain  - urine retention, danielson placed    Otherwise ROS is negative except as mentioned in the HPI.    Objective   Objective     Vital Signs:   Temp:  [98.1 °F (36.7 °C)-99.2 °F (37.3 °C)] 99.2 °F (37.3 °C)  Heart Rate:  [78-86] 78  Resp:  [16-18] 18  BP: (112-133)/(61-80) 112/61     Physical Exam:  Constitutional:  awake, alert, chronically ill-appearing, sitting up in bed in NAD  HENT: NCAT, mucous membranes moist  Respiratory: diminished in bases bilaterally, faint rales in left lower lung field on posterior exam, respiratory effort normal   Cardiovascular: RRR, II/VI systolic murmurs, No rubs, or gallops, palpable pedal pulses bilaterally  Gastrointestinal: Positive bowel sounds, soft, nontender, nondistended  Musculoskeletal: 2+ bilat LE edema  Psychiatric: Appropriate affect, cooperative  Neurologic: Oriented x 3, strength symmetric in all extremities but 4/5, Cranial Nerves grossly intact to confrontation, speech clear  Skin: rubor bilat LE, No rashes    Results Reviewed:  I have personally reviewed current lab,  radiology, and data and agree.      Results from last 7 days  Lab Units 05/03/18  0517 05/02/18  0528 05/01/18  0321   WBC 10*3/mm3 7.71 6.59 6.50   HEMOGLOBIN g/dL 9.1* 8.8* 8.4*   HEMATOCRIT % 30.1* 28.5* 27.0*   PLATELETS 10*3/mm3 167 149* 158       Results from last 7 days  Lab Units 05/03/18  0517 05/02/18  0528 05/01/18  1240 05/01/18  0321 04/30/18 2026   SODIUM mmol/L 142 145  --  143 140   POTASSIUM mmol/L 4.0 4.0  --  3.4* 3.8   CHLORIDE mmol/L 110* 112*  --  111* 106   CO2 mmol/L 27.0 26.0  --  27.0 28.0   BUN mg/dL 19 22  --  29* 35*   CREATININE mg/dL 1.10 1.30  --  1.70* 1.90*   GLUCOSE mg/dL 93 87  --  96 140*   CALCIUM mg/dL 8.7 8.6*  --  8.3* 8.8   ALT (SGPT) U/L 22 23  --   --  15   AST (SGOT) U/L 19 31  --   --  16   TROPONIN I ng/mL  --   --  0.052* 0.062* 0.040*     Estimated Creatinine Clearance: 50.7 mL/min (by C-G formula based on SCr of 1.1 mg/dL).  BNP   Date Value Ref Range Status   04/30/2018 302.0 (H) 0.0 - 100.0 pg/mL Final     Comment:     Results may be falsely decreased if patient taking Biotin.     No results found for: PHART    Microbiology Results Abnormal     None          Imaging Results (last 24 hours)     ** No results found for the last 24 hours. **        Results for orders placed during the hospital encounter of 04/30/18   Adult Transthoracic Echo Complete W/ Cont if Necessary Per Protocol    Narrative · Mild tricuspid valve regurgitation is present.  · Calculated right ventricular systolic pressure from tricuspid   regurgitation is 30 mmHg.          I have reviewed the medications.    Assessment/Plan   Assessment / Plan     Hospital Problem List     * (Principal)Generalized weakness    HTN (hypertension)    CHF (congestive heart failure)    Elevated troponin    Hyperglycemia    Elevated serum creatinine    Normocytic anemia    Spinal stenosis    Dehydration    Bilateral lower extremity edema    PVC (premature ventricular contraction)        Brief Hospital Course to  date:  Hector Mayfield Jr. is a 86 y.o. male with a past medical history of chronic heart failure, BPH, hypertension, chronic constipation, and non-Hodgkin's lymphoma who presented to the [facility] emergency department late on 4/30 with progressive generalized weakness for the past month associated with recurrent falls without significant trauma, loss of consciousness, or injury.  Patient also reported increasing peripheral edema and evaluation the ER disclosed mildly elevated BNP.  Patient was admitted for further evaluation and supportive care.      Assessment & Plan:    Progressive Generalized weakness with frequent falls  Soft Tissue injuries due to fall with left-sided pain  -No evidence of acute bony injuries on imaging  -PT/OT, needs rehab at discharge, d/w CM during multidisciplinary rounds     Chronic LV Diastolic heart failure  - Echo is normal, does not appear to be decompensated, suspect his peripheral edema is due to venous insufficiency/poor functional state   - His FeNA is suggestive of intravascular dehydration     Elevated troponin  -No evidence of ACS, ? Related to renal dysfunction, Echo normal  - As no chest pain, will defer further work-up   - Continue aspirin, check lipid panel in AM and consider adding statin    Nonoliguric Acute kidney injury, improving but remains active  - Likely prerenal azotemia, s/p additional 24hr IVF, Na+ improved to 1.1  - Repeat BMP in AM  - FeNa less than 1%, Renal Ultrasound negative  -Unknown baseline creatinine  - Strict I/O    Urinary retention  -started on flomax  -has been requiring intermittent I/O cath, danielson placed today  -start on aggressive Bowel regimen (? LBM)    Constipation  -started on bowel rx    Bilateral lower extremity edema, Asymmetric  - Duplex negative for DVT, Echo normal   - No proteinuria , No evidence of hepatic dysfunction   - Suspect venous stasis due to venous insufficiency and poor functional state  - PT wound care   Consulted for leg wraps     Normocytic anemia of chronic disease  - Hgb stable     Hypokalemia  - Resolved     Hypertension  - BP stable    DVT Prophylaxis:  SQ heparin  CODE STATUS: Full Code    Disposition: I expect the patient to be discharged to short term rehab in 1-2 days when bed available  More than 50% of time spent counseling on current illness and plan of care. Case discussed with: pt, dtr, nsg  Total time spent face to face with the patient was 24 minutes.  Total time of the encounter was 36 minutes.      Electronically signed by Casie M Mayne, PA-C, 05/03/18, 1:25 PM.

## 2018-05-03 NOTE — NURSING NOTE
"Pt still unable to void by himself. RN bladder scanned pt and found 487 mL of urine. Pt states that he feels no urge to void, but \"may feel like it later.\" RN called ULYSSES Staton about anchoring a danielson catheter or I&O cathing the pt. ULYSSES Staton states that \"I&O cathing decreases the risk of infection rather than anchoring a danielson catheter. You may wait to I&O cath the pt until morning rounds and ask attending physician seeing pt if they would like to anchor a danielson catheter instead of I&O cathing the pt again.\" RN will continue to monitor pt.   "

## 2018-05-03 NOTE — PROGRESS NOTES
Continued Stay Note  [facility]     Patient Name: Hector Mayfield Jr.  MRN: [id number]  Today's Date: 5/3/2018    Admit Date: 4/30/2018    Consent obtained for the participation in the [facility] Transitions Program.  Nely Jorge RN        Discharge Plan    No documentation.             Discharge Codes    No documentation.           Nely Jorge RN

## 2018-05-03 NOTE — PLAN OF CARE
Problem: Fall Risk (Adult)  Goal: Identify Related Risk Factors and Signs and Symptoms  Outcome: Ongoing (interventions implemented as appropriate)    Goal: Absence of Fall  Outcome: Ongoing (interventions implemented as appropriate)      Problem: Patient Care Overview  Goal: Plan of Care Review  Outcome: Ongoing (interventions implemented as appropriate)   05/03/18 0404   Coping/Psychosocial   Plan of Care Reviewed With patient   Plan of Care Review   Progress declining   OTHER   Outcome Summary Pt having troubles voiding. Pt not wanting to drink any fluids because he is unable to void and does not want to be I&O cathed. RN educated pt. VSS. Will continue to monitor.       Problem: Fluid Volume Deficit (Adult)  Goal: Identify Related Risk Factors and Signs and Symptoms  Outcome: Ongoing (interventions implemented as appropriate)    Goal: Optimal Fluid Balance  Outcome: Ongoing (interventions implemented as appropriate)      Problem: Skin Injury Risk (Adult)  Goal: Identify Related Risk Factors and Signs and Symptoms  Outcome: Ongoing (interventions implemented as appropriate)    Goal: Skin Health and Integrity  Outcome: Ongoing (interventions implemented as appropriate)

## 2018-05-04 NOTE — PROGRESS NOTES
"                  Clinical Nutrition     Nutrition Assessment  Reason for Visit:   Follow-up protocol    Patient Name: Hector Mayfield Jr.  YOB: 1931  MRN: [id number]  Date of Encounter: 05/04/18 2:38 PM  Admission date: 4/30/2018    Nutrition Assessment     Hospital Problem List  Principal Problem:    Generalized weakness  Active Problems:    HTN (hypertension)    CHF (congestive heart failure)    Elevated troponin    Hyperglycemia    Elevated serum creatinine    Normocytic anemia    Spinal stenosis    Dehydration    Bilateral lower extremity edema    PVC (premature ventricular contraction)      PMH: He  has a past medical history of Congestive heart failure (CHF); Hypertension; and Non Hodgkin's lymphoma.   PSxH: He  has a past surgical history that includes Hernia repair and Prostate surgery.       Reported/Observed/Food/Nutrition Related History:     Good appetite. Drinking supplement- family requesting for patient to receive supplement with all meals.     Anthropometrics     Height: 188 cm (74\")  Last filed wt: Weight: 74 kg (163 lb 1.6 oz) (05/04/18 0454)  Weight Method: Bed scale    Labs reviewed       Results from last 7 days  Lab Units 05/04/18  0510 05/03/18  0517   SODIUM mmol/L 142 142   POTASSIUM mmol/L 3.8 4.0   CHLORIDE mmol/L 112* 110*   CO2 mmol/L 25.0 27.0   BUN mg/dL 22 19   CREATININE mg/dL 1.10 1.10   CALCIUM mg/dL 8.6* 8.7   BILIRUBIN mg/dL  --  0.7   ALK PHOS U/L  --  120*   ALT (SGPT) U/L  --  22   AST (SGOT) U/L  --  19   GLUCOSE mg/dL 102* 93       Medications reviewed   Pertinent: Reviewed       Current Nutrition Prescription     PO: Diet Regular; Cardiac    Supplement: Boost + BID     Intake/Delivery:  PO Evaluation    Number of Days PO Intake Evaluated: 3 days    Number of Meals: 4 meals    % of PO Intake: 45%     Nutrition Diagnosis     Problem  Nutrition Diagnoses Problem 1   Problem 1:            Inadequate oral intake      Etiology (related to):   PO Intake "    Signs/Symptoms: 45% x 4 meals    (evidence by)    Nutrition Intervention     Interventions Goal    General: Nutrition support treatment    Nutrition Interventions   1.  Follow treatment progress, Care plan reviewed, Interview for preferences, Adjusted supplement    Monitor/ Evaluation    Per protocol, PO intake, Supplement intake, Pertinent labs      Will Continue to follow per protocol      Mirna Vargas RD  Time Spent: 30min

## 2018-05-04 NOTE — PLAN OF CARE
Problem: Patient Care Overview  Goal: Plan of Care Review  Outcome: Ongoing (interventions implemented as appropriate)   05/04/18 4973   Coping/Psychosocial   Plan of Care Reviewed With patient;family   OTHER   Outcome Summary Pt presents with BLE edema with limited mobility and h/o falls. Pt will benefit from light compression to help increase venous return to improve skin integrity and decrease edema.

## 2018-05-04 NOTE — PLAN OF CARE
Problem: Fall Risk (Adult)  Goal: Identify Related Risk Factors and Signs and Symptoms  Outcome: Ongoing (interventions implemented as appropriate)    Goal: Absence of Fall  Outcome: Ongoing (interventions implemented as appropriate)      Problem: Patient Care Overview  Goal: Plan of Care Review  Outcome: Ongoing (interventions implemented as appropriate)   05/04/18 8139   Coping/Psychosocial   Plan of Care Reviewed With patient;spouse   Plan of Care Review   Progress improving   OTHER   Outcome Summary Pt states that he has felt much better since f/c has been placed. No complaints. VSS. Will continue to monitor.       Problem: Fluid Volume Deficit (Adult)  Goal: Identify Related Risk Factors and Signs and Symptoms  Outcome: Ongoing (interventions implemented as appropriate)    Goal: Optimal Fluid Balance  Outcome: Ongoing (interventions implemented as appropriate)      Problem: Skin Injury Risk (Adult)  Goal: Identify Related Risk Factors and Signs and Symptoms  Outcome: Ongoing (interventions implemented as appropriate)    Goal: Skin Health and Integrity  Outcome: Ongoing (interventions implemented as appropriate)

## 2018-05-04 NOTE — CONSULTS
[facility] AT [address], [address]  [place], [place], [address]    [phone]  [email or web address]           INPATIENT CONSULTATION NOTE    Referring Physician: Dr. Dennis    Patient Care Team:  Patient Care Team:  Terrence Salazar MD as PCP - General (Internal Medicine)    Reason for consultation:   Chief Complaint   Patient presents with   • Extremity Weakness   • Fall            HPI:    Hector Mayfield Jr. is a 86 y.o. male.  History of Present Illness    He has a history of heart failure since 2015, hypertension, BPH, Non-Hodgkins Lymphoma s/p radiation and remission since 2007, and a hernia repair.    The patient was admitted to [facility] on 4/30 after presenting to the ED with complaints of generalized weakness and three falls on 4/8.  He reports that prior to his falls he was somewhat active going out of the house a couple of times a week, but since falling he had gotten to the point he didn't feel like he could get out of bed.  He reports that he has not been eating well due to a poor appetite.  He has noticed increased swelling in his lower extremities since March, but it became more severe after his falls.     Today he went into atrial fibrillation with RVR and was converted back to NSR with digoxin.  He reports no know history of atrial fibrillation.  He denies any prior ischemic evaluation.  His daughter reports at some point in the 1980's he had an issue with a slow heart rate, but it was not something that required treatment and has not been monitored for many years.  He denies any chest pain, dyspnea, palpitations, syncope, presyncope, orthopnea or PND.  He denies any pertinent family history.    PFSH:  Patient Active Problem List   Diagnosis   • HTN (hypertension)   • CHF (congestive heart failure)   • Elevated troponin   • Hyperglycemia   • Elevated serum creatinine   • Normocytic anemia   • Spinal stenosis   • Generalized weakness   • Dehydration   • Bilateral  lower extremity edema   • PVC (premature ventricular contraction)       No current facility-administered medications on file prior to encounter.      No current outpatient prescriptions on file prior to encounter.     No Known Allergies    Social History     Social History   • Marital status:      Social History Main Topics   • Smoking status: Never Smoker   • Smokeless tobacco: Never Used   • Alcohol use No   • Drug use: No     Other Topics Concern   • Not on file     History reviewed. No pertinent family history.    Review of Systems:  Positive for lower extremity edema, decreased appetite, weakness.  All other systems reviewed are negative.         Objective:     Vital Sign Min/Max for last 24 hours  Temp  Min: 97.8 °F (36.6 °C)  Max: 100.3 °F (37.9 °C)   BP  Min: 90/71  Max: 132/70   Pulse  Min: 60  Max: 151   Resp  Min: 16  Max: 20   SpO2  Min: 93 %  Max: 96 %   No Data Recorded      Intake/Output Summary (Last 24 hours) at 05/04/18 1450  Last data filed at 05/03/18 1500   Gross per 24 hour   Intake                0 ml   Output              950 ml   Net             -950 ml           Vitals:    05/04/18 1015   BP:    Pulse: 73   Resp:    Temp:    SpO2: 93%       CONSTITUTIONAL: In no acute distress.   SKIN: Warm and dry. No rashes noted  HEENT: Head is normocephalic and atraumatic. Mucous membranes are pink and moist.   NECK: Supple without masses or thyromegaly.   LUNGS: Normal effort. Diminished in the bases.  CARDIOVASCULAR: The heart has a regular rate with a normal S1 and S2, frequent ectopy. There is no murmur, gallop, rub, or click appreciated.   PERIPHERAL VASCULAR: Carotid upstroke is 2+ bilaterally and without bruits. Radial pulses are 2+ bilaterally. Posterior tibial pulses are 2+ and symmetrical. There is 2+ lower extremity edema.   ABDOMEN: Soft with no tenderness with palpitation. No hepatosplenomegaly  MUSCULOSKELETAL:  No digital cyanosis  NEUROLOGICAL: Nonfocal.  PSYCHIATRIC: Alert,  orientated x 3, appropriate affect     Labs:  Lab Results   Component Value Date    CHOL 121 05/03/2018    TRIG 72 05/03/2018    HDL 33 (L) 05/03/2018       Diagnostic Data:    EKG:  NSR with PVC's    Tele:  NSR with PVC's    TTE 5/1/2018  · Mild tricuspid valve regurgitation is present.  · Calculated right ventricular systolic pressure from tricuspid regurgitation is 30 mmHg.         Assessment and Plan:   Assessment  -Atrial fibrillation with RVR, currently in NSR with PVCS, no known prior occurrence. [id number]= 2 for age. High risk for anticoagulation due to frailty and anemia.  -Hypertension, stable this admission  -History of congestive heart, normal echo this admission.    Plan  -Decrease ASA to 81mg PO Daily.  -Being metoprolol tartrate 25 mg BID  -Will need to follow up in the clinic in 12 weeks with ULYSSES Pereira obtained past medical, family history, social history, review of systems and functioned as a scribe for the remainder of the dictation for Dr. Mccrary.    5/4/2018     I,Checo Noe M.D., personally performed the services described in this documentation as scribed by the above named individual in my presence, and it is both accurate and complete.  5/4/2018  3:23 PM

## 2018-05-04 NOTE — PROGRESS NOTES
[facility] Medicine Services  PROGRESS NOTE    Patient Name: Hector Mayfield Jr.  : 1931  MRN: [id number]    Date of Admission: 2018  Length of Stay: 3  Primary Care Physician: Terrence Salazar MD    Subjective   Subjective     CC:  F/u generalized weakness     HPI:  Spoke with nsg, was in Afib with RVR with hypotension this a.m. Converted shortly after digoxin. Pt denied SOA/CP at time of event.   Pt resting in bed at time of visit, dtr at bedside. Denies SOA, CP, palpitations, N/V, dizziness. Still no appetite or BM.  LE swelling improving per pt.   Review of Systems  Gen- No fevers, chills  CV- No chest pain, palpitations  Resp- No cough, dyspnea  GI- + constipationNo N/V/D, abd pain  - urine retention, danielson placed    Otherwise ROS is negative except as mentioned in the HPI.    Objective   Objective     Vital Signs:   Temp:  [97.8 °F (36.6 °C)-100.3 °F (37.9 °C)] 98 °F (36.7 °C)  Heart Rate:  [] 73  Resp:  [16-20] 18  BP: ()/(51-78) 90/71     Physical Exam:  Constitutional:  awake, alert, chronically ill-appearing, sitting up in bed in NAD  HENT: NCAT, mucous membranes moist  Respiratory: diminished in bases bilaterally, no wheezes/rales, respiratory effort normal   Cardiovascular: RRR, II/VI systolic murmurs, No rubs, or gallops, palpable pedal pulses bilaterally  Gastrointestinal: Positive bowel sounds, soft, nontender, nondistended  Musculoskeletal: 2+ bilat LE edema stable  Psychiatric: Appropriate affect, cooperative  Neurologic: Oriented x 3, strength symmetric in all extremities but 4/5, Cranial Nerves grossly intact to confrontation, speech clear  Skin: rubor bilat LE, No rashes    Results Reviewed:  I have personally reviewed current lab, radiology, and data and agree.      Results from last 7 days  Lab Units 18  0517 18  0528 18  0321   WBC 10*3/mm3 7.71 6.59 6.50   HEMOGLOBIN g/dL 9.1* 8.8* 8.4*   HEMATOCRIT % 30.1* 28.5* 27.0*    PLATELETS 10*3/mm3 167 149* 158       Results from last 7 days  Lab Units 05/04/18  0510 05/03/18  0517 05/02/18  0528 05/01/18  1240 05/01/18  0321 04/30/18 2026   SODIUM mmol/L 142 142 145  --  143 140   POTASSIUM mmol/L 3.8 4.0 4.0  --  3.4* 3.8   CHLORIDE mmol/L 112* 110* 112*  --  111* 106   CO2 mmol/L 25.0 27.0 26.0  --  27.0 28.0   BUN mg/dL 22 19 22  --  29* 35*   CREATININE mg/dL 1.10 1.10 1.30  --  1.70* 1.90*   GLUCOSE mg/dL 102* 93 87  --  96 140*   CALCIUM mg/dL 8.6* 8.7 8.6*  --  8.3* 8.8   ALT (SGPT) U/L  --  22 23  --   --  15   AST (SGOT) U/L  --  19 31  --   --  16   TROPONIN I ng/mL  --   --   --  0.052* 0.062* 0.040*     Estimated Creatinine Clearance: 50.5 mL/min (by C-G formula based on SCr of 1.1 mg/dL).  No results found for: BNP  No results found for: PHART    Microbiology Results Abnormal     None          Imaging Results (last 24 hours)     ** No results found for the last 24 hours. **        Results for orders placed during the hospital encounter of 04/30/18   Adult Transthoracic Echo Complete W/ Cont if Necessary Per Protocol    Narrative · Mild tricuspid valve regurgitation is present.  · Calculated right ventricular systolic pressure from tricuspid   regurgitation is 30 mmHg.          I have reviewed the medications.    Assessment/Plan   Assessment / Plan     Hospital Problem List     * (Principal)Generalized weakness    HTN (hypertension)    CHF (congestive heart failure)    Elevated troponin    Hyperglycemia    Elevated serum creatinine    Normocytic anemia    Spinal stenosis    Dehydration    Bilateral lower extremity edema    PVC (premature ventricular contraction)        Brief Hospital Course to date:  Hector Mayfield  is a 86 y.o. male with a past medical history of chronic heart failure, BPH, hypertension, chronic constipation, and non-Hodgkin's lymphoma who presented to the [facility] emergency department late on 4/30 with progressive generalized weakness for  the past month associated with recurrent falls without significant trauma, loss of consciousness, or injury.  Patient also reported increasing peripheral edema and evaluation the ER disclosed mildly elevated BNP.  Patient was admitted for further evaluation and supportive care.      Assessment & Plan:    Progressive Generalized weakness with frequent falls  Soft Tissue injuries due to fall with left-sided pain  -No evidence of acute bony injuries on imaging  -PT/OT, needs rehab at discharge, d/w CM during multidisciplinary rounds     ? New onset Afib with RVR  - went into afib this am (noted EKG, was obtained after receiving digoxin). With HR 150s and hypotension converted back to NSR with IV digoxin. BP normalized.  -will consult Cardiology, appreciate input  -checking mag level    Chronic LV Diastolic heart failure  - Echo is normal, does not appear to be decompensated, suspect his peripheral edema is due to venous insufficiency/poor functional state   - His FeNA is suggestive of intravascular dehydration     Elevated troponin  -No evidence of ACS, ? Related to renal dysfunction, Echo normal  - As no chest pain, will defer further work-up   - Continue aspirin, check lipid panel in AM and consider adding statin    Nonoliguric Acute kidney injury, improving but remains active  - Likely prerenal azotemia, s/p additional 24hr IVF, Na+ improved to 1.1  - Repeat BMP in AM  - FeNa less than 1%, Renal Ultrasound negative  -Unknown baseline creatinine  - Strict I/O    Urinary retention  -started on flomax  -has been requiring intermittent I/O cath, danielson placed today  -start on aggressive Bowel regimen (? LBM)- suppository/enema today  followed by Dr Haimlton outpt Urology at [facility]    Constipation  -started on bowel rx    Bilateral lower extremity edema, Asymmetric  - Duplex negative for DVT, Echo normal   - No proteinuria , No evidence of hepatic dysfunction   - Suspect venous stasis due to venous insufficiency  and poor functional state  - PT wound care  Consulted for leg wraps     Normocytic anemia of chronic disease  - Hgb stable     Hypokalemia  - Resolved     Hypertension  - BP stable    DVT Prophylaxis:  SQ heparin  CODE STATUS: Full Code    Disposition: I expect the patient to be discharged to short term rehab in 1-2 days when bed available         Electronically signed by Casie M Mayne, PA-C, 05/04/18, 12:05 PM.

## 2018-05-04 NOTE — PROGRESS NOTES
Continued Stay Note  [facility]     Patient Name: Hector Mayfield Jr.  MRN: [id number]  Today's Date: 5/4/2018    Admit Date: 4/30/2018          Discharge Plan     Row Name 05/04/18 1515       Plan    Plan Ongoing.   Rehab referrals sent.    Patient/Family in Agreement with Plan yes    Plan Comments CM made f/u call to [facility] Mirella mccray.   Per Mirella, last therapy notes are from 5/2 and therefore, [facility] will check back on Monday for f/u on additional notes.       F/U calls made to Marixa Johns(covering today) and she said they will review pt. On Monday and to check back with Mirella Wray.   CM also, left VM with Rosa [employer]LSON rep., [employer].    Await call back.     JUSTINO continuing to follow.              Discharge Codes    No documentation.           Tricia Martini RN

## 2018-05-04 NOTE — THERAPY WOUND CARE TREATMENT
Acute Care- Physical Therapy Lymphedema Initial Evaluation   [facility]     Patient Name: Hector Mayfield Jr.  : 1931  MRN: [id number]  Today's Date: 2018  Onset of Illness/Injury or Date of Surgery: 18   Date of Referral to PT: 18    Referring Physician: ANGEL Hilton       Admit Date: 2018      Visit Dx:     ICD-10-CM ICD-9-CM   1. Dehydration E86.0 276.51   2. Elevated serum creatinine R79.89 790.99   3. Generalized weakness R53.1 780.79   4. Impaired mobility and activities of daily living Z74.09 799.89   5. Multiple contusions T07.XXXA 924.8   6. Impaired mobility and ADLs Z74.09 799.89   7. Impaired functional mobility, balance, gait, and endurance Z74.09 V49.89       Patient Active Problem List   Diagnosis   • HTN (hypertension)   • CHF (congestive heart failure)   • Elevated troponin   • Hyperglycemia   • Elevated serum creatinine   • Normocytic anemia   • Spinal stenosis   • Generalized weakness   • Dehydration   • Bilateral lower extremity edema   • PVC (premature ventricular contraction)        Past Medical History:   Diagnosis Date   • Congestive heart failure (CHF)    • Hypertension    • Non Hodgkin's lymphoma         Past Surgical History:   Procedure Laterality Date   • HERNIA REPAIR     • PROSTATE SURGERY                 Lymphedema     Row Name 18 1530             Lymphedema Edema Assessment    Ptting Edema Category By severity  -      Pitting Edema Mild;Moderate   L>R  -MF      Recorded by [MF] Ricky White, PT              Skin Changes/Observations    Location/Assessment Lower Extremity  -MF      Lower Extremity Conditions bilateral:;clean;intact;dry;shiny;scaly  -MF      Recorded by [MF] Ricky White, PT              Lymphedema Pulses/Capillary Refill    Lymphedema Pulses/Capillary Refill lower extremity pulses;capillary refill  -MF      Dorsalis Pedis Pulse right:;left:;+1 diminished  -MF      Posterior Tibialis Pulse right:;left:;+1 diminished  -MF       Capillary Refill lower extremity capillary refill  -MF      Lower Extremity Capillary Refill right:;left:;less than 3 seconds  -MF      Recorded by [MF] Ricky White, PT              Lymphedema Measurements    Measurement Type(s) Quick Girth  -MF      Quick Girth Areas Lower extremities  -MF      Recorded by [MF] Ricky White, PT              LLE Quick Girth (cm)    Mid foot 25 cm  -MF      Smallest ankle 25 cm  -MF      Largest calf 37 cm  -MF      Recorded by [MF] Ricky White, PT              RLE Quick Girth (cm)    Mid foot 25 cm  -MF      Smallest ankle 23.5 cm  -MF      Largest calf 34 cm  -MF      RLE Quick Girth Total 82.5  -MF      Recorded by [MF] Ricky White, PT              Compression/Skin Care    Compression/Skin Care skin care;wrapping location;bandaging  -MF      Skin Care washed/dried;lotion applied;moisturizing lotion applied  -MF      Wrapping Location lower extremity  -MF      Wrapping Location LE right:;left:;foot to knee  -MF      Bandage Layers cotton elastic stocking- double layer (comment size)   size 4 doubled at foot and ankle for gradient compression.   -MF      Recorded by [MF] Ricky White, PT        User Key  (r) = Recorded By, (t) = Taken By, (c) = Cosigned By    Initials Name Effective Dates     Ricky White, PT 06/19/15 -              PT ASSESSMENT (last 12 hours)      Physical Therapy Evaluation     Row Name 05/04/18 1447          PT Evaluation Time/Intention    Patient Effort fair  -DM     Symptoms Noted During/After Treatment fatigue;increased pain  -DM     Comment still severe B knee pain w/ attempts at EOB sitting  -DM     Row Name 05/04/18 0497          Physical Therapy Clinical Impression    PT Diagnosis (PT Clinical Impression) BLE edema with limited mobility   -MF     Criteria for Skilled Interventions Met (PT Clinical Impression) yes;treatment indicated  -     Row Name 05/04/18 1530          Physical Therapy Goals    Wound Care Goal  Selection (PT) wound care, PT goal 1  -MF     Additional Documentation Wound Care Goal Selection (PT) (Row)  -MF     Row Name 05/04/18 1530          Wound Care Goal 1 (PT)    Wound Care Goal 1 (PT) Decrease BLE edema by 10%   -MF     Time Frame (Wound Care Goal 1, PT) 10 days  -MF       User Key  (r) = Recorded By, (t) = Taken By, (c) = Cosigned By    Initials Name Provider Type     Ricky White, PT Physical Therapist    IAN Cruz, PT Physical Therapist          Physical Therapy Education     Title: PT OT SLP Therapies (Active)     Topic: Physical Therapy (Active)     Point: Mobility training (Active)    Learning Progress Summary     Learner Status Readiness Method Response Comment Documented by    Patient Active Eager E,D NR  DM 05/04/18 1519     Active Acceptance E,D NR  DM 05/02/18 1249    Family Active Eager E,D NR  DM 05/04/18 1519    Significant Other Active Acceptance E,D NR  DM 05/02/18 1249          Point: Home exercise program (Active)    Learning Progress Summary     Learner Status Readiness Method Response Comment Documented by    Patient Active Eager E,D NR  DM 05/04/18 1519     Active Acceptance E,D NR  DM 05/02/18 1249    Family Active Eager E,D NR  DM 05/04/18 1519    Significant Other Active Acceptance E,D NR  DM 05/02/18 1249          Point: Body mechanics (Active)    Learning Progress Summary     Learner Status Readiness Method Response Comment Documented by    Patient Active Eager E,D NR  DM 05/04/18 1519     Active Acceptance E,D NR  DM 05/02/18 1249    Family Active Eager E,D NR  DM 05/04/18 1519    Significant Other Active Acceptance E,D NR  DM 05/02/18 1249          Point: Precautions (Active)    Learning Progress Summary     Learner Status Readiness Method Response Comment Documented by    Patient Active Eager E,D NR  DM 05/04/18 1519     Active Acceptance E,D NR  DM 05/02/18 1249    Family Active Eager E,D NR  DM 05/04/18 1519    Significant Other Active Acceptance E,D NR   DM 05/02/18 1249                      User Key     Initials Effective Dates Name Provider Type Discipline    DM 06/19/15 -  Bambi Cruz, PT Physical Therapist PT                   Recommendation and Plan     Plan of Care Reviewed With: patient, family  Outcome Summary: Pt presents with BLE edema with limited mobility and h/o falls. Pt will benefit from light compression to help increase venous return to improve skin integrity and decrease edema.             Outcome Measures     Row Name 05/04/18 1447 05/02/18 1333 05/02/18 1045       How much help from another person do you currently need...    Turning from your back to your side while in flat bed without using bedrails? 2  -DM  -- 2  -DM    Moving from lying on back to sitting on the side of a flat bed without bedrails? 2  -DM  -- 2  -DM    Moving to and from a bed to a chair (including a wheelchair)? 1  -DM  -- 1  -DM    Standing up from a chair using your arms (e.g., wheelchair, bedside chair)? 1  -DM  -- 1  -DM    Climbing 3-5 steps with a railing? 1  -DM  -- 1  -DM    To walk in hospital room? 1  -DM  -- 1  -DM    AM-PAC 6 Clicks Score 8  -DM  -- 8  -DM       How much help from another is currently needed...    Putting on and taking off regular lower body clothing?  -- 1  -KF  --    Bathing (including washing, rinsing, and drying)  -- 2  -KF  --    Toileting (which includes using toilet bed pan or urinal)  -- 1  -KF  --    Putting on and taking off regular upper body clothing  -- 2  -KF  --    Taking care of personal grooming (such as brushing teeth)  -- 3  -KF  --    Eating meals  -- 3  -KF  --    Score  -- 12  -KF  --       Functional Assessment    Outcome Measure Options AM-PAC 6 Clicks Basic Mobility (PT)  -DM AM-PAC 6 Clicks Daily Activity (OT)  -KF AM-PAC 6 Clicks Basic Mobility (PT)  -DM      User Key  (r) = Recorded By, (t) = Taken By, (c) = Cosigned By    Initials Name Provider Type    DM Bambi Cruz, NGOZI Physical Therapist    FRANCIS HERNANDEZ  Sanket OT Occupational Therapist            Time Calculation        PT Charges     Row Name 05/04/18 1530 05/04/18 1522          Time Calculation    Start Time 1530  - 1447  -DM     PT Received On  -- 05/04/18  -DM     PT Goal Re-Cert Due Date 05/12/18  - 05/12/18  -DM        Time Calculation- PT    Total Timed Code Minutes- PT 23 minute(s)  -MF 23 minute(s)  -DM       User Key  (r) = Recorded By, (t) = Taken By, (c) = Cosigned By    Initials Name Provider Type     Ricky White, PT Physical Therapist    IAN Cruz, PT Physical Therapist            Therapy Charges for Today     Code Description Service Date Service Provider Modifiers Qty    49162121126 HC PT MULTILAYER COMP ARM TO FINGERS 5/4/2018 Ricky White, PT GP 1    60795437549 HC PT RE-EVAL ESTABLISHED PLAN 2 5/4/2018 Ricky White, PT GP 1            PT G-Codes  Outcome Measure Options: AM-PAC 6 Clicks Basic Mobility (PT)      Ricky White, PT  5/4/2018

## 2018-05-04 NOTE — THERAPY TREATMENT NOTE
Acute Care - Physical Therapy Treatment Note  [facility]     Patient Name: Hector Mayfield Jr.  : 1931  MRN: [id number]  Today's Date: 2018  Onset of Illness/Injury or Date of Surgery: 18  Date of Referral to PT: 18  Referring Physician: ANGEL Hilton    Admit Date: 2018    Visit Dx:    ICD-10-CM ICD-9-CM   1. Dehydration E86.0 276.51   2. Elevated serum creatinine R79.89 790.99   3. Generalized weakness R53.1 780.79   4. Impaired mobility and activities of daily living Z74.09 799.89   5. Multiple contusions T07.XXXA 924.8   6. Impaired mobility and ADLs Z74.09 799.89   7. Impaired functional mobility, balance, gait, and endurance Z74.09 V49.89     Patient Active Problem List   Diagnosis   • HTN (hypertension)   • CHF (congestive heart failure)   • Elevated troponin   • Hyperglycemia   • Elevated serum creatinine   • Normocytic anemia   • Spinal stenosis   • Generalized weakness   • Dehydration   • Bilateral lower extremity edema   • PVC (premature ventricular contraction)       Therapy Treatment          Rehabilitation Treatment Summary     Row Name 18 1447             Treatment Time/Intention    Discipline physical therapist  -DM      Document Type therapy note (daily note)  -DM      Subjective Information complains of;weakness;pain   B KNEEpain persists;inLiftRoom;nsg.reqBSCasTol,orBedpan   -DM      Mode of Treatment physical therapy  -DM      Patient/Family Observations sup in bed;dtr present;tele; RA;EXIT ALARM;WAFF RAMA;PA assessed;[facility]  -DM2      Therapy Frequency (PT Clinical Impression) daily  -DM2      Existing Precautions/Restrictions fall;other (see comments)   excruciating knee pain w/ EOB attempts  -DM2      Recorded by [DM] Bambi Cruz, PT 18 1504 18 1504  [DM2] Bambi Cruz, PT 18 1509 18 1509      Row Name 18 1447             Vital Signs    Pre Systolic BP Rehab 90  -DM      Pre Treatment Diastolic BP  "71  -DM      Pretreatment Heart Rate (beats/min) 73  -DM      Posttreatment Heart Rate (beats/min) 78  -DM      Pre SpO2 (%) 93  -DM      O2 Delivery Pre Treatment room air  -DM      Post SpO2 (%) 93  -DM      O2 Delivery Post Treatment room air  -DM      Pre Patient Position Supine  -DM      Intra Patient Position Sitting  -DM      Post Patient Position Supine  -DM      Recorded by [DM] Bambi Cruz, PT 05/04/18 1509 05/04/18 1509      Row Name 05/04/18 1447             Cognitive Assessment/Intervention    Additional Documentation Cognitive Assessment/Intervention (Group)  -DM      Recorded by [DM] Bambi MARY Cruz, PT 05/04/18 1509 05/04/18 1509      Row Name 05/04/18 1447             Cognitive Assessment/Intervention- PT/OT    Affect/Mental Status (Cognitive) WFL  -DM      Orientation Status (Cognition) oriented x 4  -DM      Follows Commands (Cognition) follows one step commands;over 90% accuracy  -DM      Cognitive Function (Cognitive) WFL  -DM      Safety Deficit (Cognitive) moderate deficit  -DM      Personal Safety Interventions fall prevention program maintained;gait belt;nonskid shoes/slippers when out of bed  -DM      Recorded by [DM] Bambi Cruz, PT 05/04/18 1509 05/04/18 1509      Row Name 05/04/18 1447             Bed Mobility Assessment/Treatment    Bed Mobility Assessment/Treatment rolling left;rolling right;scooting/bridging;supine-sit;sit-supine  -DM      Rolling Left [place] (Bed Mobility) maximum assist (25% patient effort)  -DM      Rolling Right [place] (Bed Mobility) maximum assist (25% patient effort)  -DM      Scooting/Bridging [place] (Bed Mobility) dependent (less than 25% patient effort);2 person assist  -DM      Supine-Sit [place] (Bed Mobility) maximum assist (25% patient effort);2 person assist   crying out w/ severe marixa.kneePain;\"lay me down;kills Me\"  -DM      Sit-Supine [place] (Bed Mobility) dependent (less than 25% patient effort);2 person assist "   nsg req ret. to sup & place bedpan  -DM      Bed Mobility, Safety Issues decreased use of arms for pushing/pulling;decreased use of legs for bridging/pushing  -DM      Assistive Device (Bed Mobility) bed rails;draw sheet;head of bed elevated  -DM      Recorded by [DM] Bambi Cruz, PT 05/04/18 1518 05/04/18 1518      Row Name 05/04/18 1447             Transfer Assessment/Treatment    Comment (Transfers) def. d/t excruciating B knee pain  -DM      Recorded by [DM] Bambi Cruz, PT 05/04/18 1518 05/04/18 1518      Row Name 05/04/18 1447             Gait/Stairs Assessment/Training    Comment (Gait/Stairs) def. d/t pain  -DM      Recorded by [DM] Bambi Cruz, PT 05/04/18 1518 05/04/18 1518      Row Name 05/04/18 1447             Motor Skills Assessment/Interventions    Additional Documentation Balance (Group);Balance Interventions (Group);Therapeutic Exercise (Group);Therapeutic Exercise Interventions (Group)  -DM      Recorded by [DM] Bambi Cruz, PT 05/04/18 1518 05/04/18 1518      Row Name 05/04/18 1447             Therapeutic Exercise    Lower Extremity Range of Motion (Therapeutic Exercise) hip flexion/extension, bilateral;hip abduction/adduction, bilateral;ankle dorsiflexion/plantar flexion, bilateral   SLR,HANSEL, gentle H.SLIDES; DEF SAQ d/t severe knee pain  -DM      Exercise Type (Therapeutic Exercise) AAROM (active assistive range of motion);AROM (active range of motion);isometric contraction, static  -DM      Position (Therapeutic Exercise) supine  -DM      Sets/Reps (Therapeutic Exercise) 10  -DM      Recorded by [DM] Bambi Cruz, PT 05/04/18 1518 05/04/18 1518      Row Name 05/04/18 1447             Balance    Balance static sitting balance;dynamic sitting balance  -DM      Recorded by [DM] Bambi Cruz, PT 05/04/18 1518 05/04/18 1518      Row Name 05/04/18 1447             Static Sitting Balance    Level of [place] (Unsupported Sitting, Static Balance) maximal assist, 25 to 49%  patient effort  -DM      Sitting Position (Unsupported Sitting, Static Balance) sitting on edge of bed  -DM      Time Able to Maintain Position (Unsupported Sitting, Static Balance) 45 to 60 seconds  -DM      Recorded by [DM] Bambi Cruz, PT 05/04/18 1518 05/04/18 1518      Row Name 05/04/18 1447             Dynamic Sitting Balance    Level of [place], Reaches Outside Midline (Sitting, Dynamic Balance) maximal assist, 25 to 49% patient effort   max A of 2  -DM      Sitting Position, Reaches Outside Midline (Sitting, Dynamic Balance) sitting on edge of bed   began crying out w/ pain  -DM      Recorded by [DM] Bambi Cruz, PT 05/04/18 1518 05/04/18 1518      Row Name 05/04/18 1447             Positioning and Restraints    Pre-Treatment Position in bed  -DM      Post Treatment Position bed  -DM      In Bed notified nsg;supine;call light within reach;encouraged to call for assist;exit alarm on;with family/caregiver   rolled & placed on bedpan per nsg req.  -DM      Recorded by [DM] Bambi Cruz, PT 05/04/18 1518 05/04/18 1518      Row Name 05/04/18 1447             Pain Assessment    Additional Documentation Pain Scale: Numbers Pre/Post-Treatment (Group)  -DM      Recorded by [DM] Bambi Cruz, PT 05/04/18 1518 05/04/18 1518      Row Name 05/04/18 1447             Pain Scale: Numbers Pre/Post-Treatment    Pain Scale: Numbers, Pretreatment 7/10  -DM      Pain Scale: Numbers, Post-Treatment 10/10  -DM      Pain Location - Side Bilateral  -DM      Pain Location knee  -DM      Pain Intervention(s) Medication (See MAR);Repositioned;Rest  -DM      Recorded by [DM] Bambi Cruz, PT 05/04/18 1518 05/04/18 1518      Row Name 05/04/18 1447             Outcome Summary/Treatment Plan (PT)    Daily Summary of Progress (PT) progress towards functional goals is fair  -DM      Anticipated Discharge Disposition (PT) inpatient rehab facility  -DM      Recorded by [DM] Bambi Cruz, PT 05/04/18 1518 05/04/18 1518         User Key  (r) = Recorded By, (t) = Taken By, (c) = Cosigned By    Initials Name Effective Dates Discipline    DM Bambi Cruz, PT 06/19/15 -  PT                     Physical Therapy Education     Title: PT OT SLP Therapies (Active)     Topic: Physical Therapy (Active)     Point: Mobility training (Active)    Learning Progress Summary     Learner Status Readiness Method Response Comment Documented by    Patient Active Eager E,D NR  DM 05/04/18 1519     Active Acceptance E,D NR  DM 05/02/18 1249    Family Active Eager E,D NR  DM 05/04/18 1519    Significant Other Active Acceptance E,D NR  DM 05/02/18 1249          Point: Home exercise program (Active)    Learning Progress Summary     Learner Status Readiness Method Response Comment Documented by    Patient Active Eager E,D NR  DM 05/04/18 1519     Active Acceptance E,D NR  DM 05/02/18 1249    Family Active Eager E,D NR  DM 05/04/18 1519    Significant Other Active Acceptance E,D NR  DM 05/02/18 1249          Point: Body mechanics (Active)    Learning Progress Summary     Learner Status Readiness Method Response Comment Documented by    Patient Active Eager E,D NR  DM 05/04/18 1519     Active Acceptance E,D NR  DM 05/02/18 1249    Family Active Eager E,D NR  DM 05/04/18 1519    Significant Other Active Acceptance E,D NR  DM 05/02/18 1249          Point: Precautions (Active)    Learning Progress Summary     Learner Status Readiness Method Response Comment Documented by    Patient Active Eager E,D NR  DM 05/04/18 1519     Active Acceptance E,D NR  DM 05/02/18 1249    Family Active Eager E,D NR  DM 05/04/18 1519    Significant Other Active Acceptance E,D NR  DM 05/02/18 1249                      User Key     Initials Effective Dates Name Provider Type Discipline    DM 06/19/15 -  Bambi Cruz, PT Physical Therapist PT                    PT Recommendation and Plan  Anticipated Discharge Disposition (PT): inpatient rehab facility  Planned Therapy Interventions  (PT Eval): balance training, bed mobility training, gait training, home exercise program, patient/family education, strengthening, transfer training  Therapy Frequency (PT Clinical Impression): daily  Outcome Summary/Treatment Plan (PT)  Daily Summary of Progress (PT): progress towards functional goals is fair  Anticipated Discharge Disposition (PT): inpatient rehab facility  Plan of Care Reviewed With: patient, daughter  Progress: improving  Outcome Summary: Able to katarzyna 1 min. EOB sitting, but onset excruciating B knee pain w/ attempt to shift wt onto LE'S FOR SPT to BSC, so def. transf & ret. to sup for bedpan; limited by low HGB (9.1)           Outcome Measures     Row Name 05/04/18 1447 05/02/18 1333 05/02/18 1045       How much help from another person do you currently need...    Turning from your back to your side while in flat bed without using bedrails? 2  -DM  -- 2  -DM    Moving from lying on back to sitting on the side of a flat bed without bedrails? 2  -DM  -- 2  -DM    Moving to and from a bed to a chair (including a wheelchair)? 1  -DM  -- 1  -DM    Standing up from a chair using your arms (e.g., wheelchair, bedside chair)? 1  -DM  -- 1  -DM    Climbing 3-5 steps with a railing? 1  -DM  -- 1  -DM    To walk in hospital room? 1  -DM  -- 1  -DM    AM-PAC 6 Clicks Score 8  -DM  -- 8  -DM       How much help from another is currently needed...    Putting on and taking off regular lower body clothing?  -- 1  -KF  --    Bathing (including washing, rinsing, and drying)  -- 2  -KF  --    Toileting (which includes using toilet bed pan or urinal)  -- 1  -KF  --    Putting on and taking off regular upper body clothing  -- 2  -KF  --    Taking care of personal grooming (such as brushing teeth)  -- 3  -KF  --    Eating meals  -- 3  -KF  --    Score  -- 12  -KF  --       Functional Assessment    Outcome Measure Options AM-PAC 6 Clicks Basic Mobility (PT)  -DM AM-PAC 6 Clicks Daily Activity (OT)  -KF AM-PAC 6 Clicks  Basic Mobility (PT)  -DM      User Key  (r) = Recorded By, (t) = Taken By, (c) = Cosigned By    Initials Name Provider Type    DM Bambi Cruz, PT Physical Therapist    KF Fatoumata Coles, OT Occupational Therapist           Time Calculation:         PT Charges     Row Name 05/04/18 1522             Time Calculation    Start Time 1447  -DM      PT Received On 05/04/18  -DM      PT Goal Re-Cert Due Date 05/12/18  -DM         Time Calculation- PT    Total Timed Code Minutes- PT 23 minute(s)  -DM        User Key  (r) = Recorded By, (t) = Taken By, (c) = Cosigned By    Initials Name Provider Type    IAN Cruz, PT Physical Therapist          Therapy Charges for Today     Code Description Service Date Service Provider Modifiers Qty    18359406678 HC PT THER PROC EA 15 MIN 5/4/2018 Bambi Cruz, PT GP 2          PT G-Codes  Outcome Measure Options: AM-PAC 6 Clicks Basic Mobility (PT)    Bambi Cruz, PT  5/4/2018

## 2018-05-04 NOTE — PLAN OF CARE
Problem: Patient Care Overview  Goal: Plan of Care Review  Outcome: Ongoing (interventions implemented as appropriate)   05/04/18 2056   Coping/Psychosocial   Plan of Care Reviewed With patient;daughter   Plan of Care Review   Progress improving   OTHER   Outcome Summary Able to katarzyna 1 min. EOB sitting, but onset excruciating B knee pain w/ attempt to shift wt onto LE'S FOR SPT to BSC, so def. transf & ret. to sup for bedpan; limited by low HGB (9.1)

## 2018-05-05 NOTE — PROGRESS NOTES
[facility] Medicine Services  PROGRESS NOTE    Patient Name: Hector Mayfield Jr.  : 1931  MRN: [id number]    Date of Admission: 2018  Length of Stay: 4  Primary Care Physician: Terrence Salazar MD    Subjective   Subjective     CC:  F/u generalized weakness     HPI:  BLE edema remains improved.  Denies SOA at rest.  Still c/o musculoskeletal soreness related to recent falls but no focal complaints. Still no BM despite few days of bowel regimen -->  Pt feels he will go soon though, advised if no BM by tomorrow will attempt more aggressive options.    Review of Systems  Gen- No fevers, chills  CV- No chest pain, palpitations  Resp- No cough, dyspnea  GI- + constipationNo N/V/D, abd pain  - urine retention, danielson placed    Otherwise ROS is negative except as mentioned in the HPI.    Objective   Objective     Vital Signs:   Temp:  [97.6 °F (36.4 °C)-98.8 °F (37.1 °C)] 97.6 °F (36.4 °C)  Heart Rate:  [59-61] 61  Resp:  [16] 16  BP: (117-124)/(62-72) 122/72     Physical Exam:  Constitutional:  awake, alert, chronically ill-appearing, sitting up in bed in NAD  HENT: NCAT, mucous membranes moist  Respiratory: diminished in bases bilaterally, no wheezes/rales, respiratory effort normal   Cardiovascular: RRR, II/VI systolic murmur  Gastrointestinal: Soft, nontender, nondistended  Musculoskeletal: 1+ bilat LE edema stable  Psychiatric: Appropriate affect, cooperative  Skin: rubor bilat LE, No rashes    Results Reviewed:  I have personally reviewed current lab, radiology, and data and agree.      Results from last 7 days  Lab Units 18  0518  0528 18  0321   WBC 10*3/mm3 7.71 6.59 6.50   HEMOGLOBIN g/dL 9.1* 8.8* 8.4*   HEMATOCRIT % 30.1* 28.5* 27.0*   PLATELETS 10*3/mm3 167 149* 158       Results from last 7 days  Lab Units 18  0502 18  0510 18  0517 18  0528 18  1240 18  0321 18   SODIUM mmol/L 142 142 142 145  --   143 140   POTASSIUM mmol/L 4.0 3.8 4.0 4.0  --  3.4* 3.8   CHLORIDE mmol/L 111* 112* 110* 112*  --  111* 106   CO2 mmol/L 26.0 25.0 27.0 26.0  --  27.0 28.0   BUN mg/dL 20 22 19 22  --  29* 35*   CREATININE mg/dL 1.00 1.10 1.10 1.30  --  1.70* 1.90*   GLUCOSE mg/dL 101* 102* 93 87  --  96 140*   CALCIUM mg/dL 8.3* 8.6* 8.7 8.6*  --  8.3* 8.8   ALT (SGPT) U/L  --   --  22 23  --   --  15   AST (SGOT) U/L  --   --  19 31  --   --  16   TROPONIN I ng/mL  --   --   --   --  0.052* 0.062* 0.040*     Estimated Creatinine Clearance: 55.5 mL/min (by C-G formula based on SCr of 1 mg/dL).  No results found for: BNP  No results found for: PHART    Microbiology Results Abnormal     None          Imaging Results (last 24 hours)     ** No results found for the last 24 hours. **        Results for orders placed during the hospital encounter of 04/30/18   Adult Transthoracic Echo Complete W/ Cont if Necessary Per Protocol    Narrative · Mild tricuspid valve regurgitation is present.  · Calculated right ventricular systolic pressure from tricuspid   regurgitation is 30 mmHg.          I have reviewed the medications.    Assessment/Plan   Assessment / Plan     Hospital Problem List     * (Principal)Generalized weakness    HTN (hypertension)    CHF (congestive heart failure)    Elevated troponin    Hyperglycemia    Elevated serum creatinine    Normocytic anemia    Spinal stenosis    Dehydration    Bilateral lower extremity edema    PVC (premature ventricular contraction)        Brief Hospital Course to date:  Hector Mayfield Jr. is a 86 y.o. male with a past medical history of chronic heart failure, BPH, hypertension, chronic constipation, and non-Hodgkin's lymphoma who presented to the [facility] emergency department late on 4/30 with progressive generalized weakness for the past month associated with recurrent falls without significant trauma, loss of consciousness, or injury.  Patient also reported increasing peripheral  edema and evaluation the ER disclosed mildly elevated BNP.  Patient was admitted for further evaluation and supportive care.      Assessment & Plan:    Progressive Generalized weakness with frequent falls  Soft Tissue injuries due to fall with left-sided pain  -No evidence of acute bony injuries on imaging     5/4/18 episode of Afib with RVR  - converted back to NSR with IV digoxin. BP normalized.  - Cards eval'd, added metoprolol (CHADsVASc = 2, no anticoagulation recommended).    - at d/c plan to f/u with Dr. Noe in 3 months    Chronic LV Diastolic heart failure  - Echo is normal, does not appear to be decompensated, suspect his peripheral edema is due to venous insufficiency/poor functional state/[poor nutrition   - His FeNA was suggestive of intravascular dehydration     Elevated troponin  -No evidence of ACS, likely related to CRISTO, Echo normal  - Continue aspirin, no direct indication for statin (FLP reviewed)    Nonoliguric Acute kidney injury, resolved  - Likely prerenal azotemia, s/p IVF    Urinary retention, likely due to immobility and additive constipation  -started on flomax  -had been requiring intermittent I/O cath, danielson now placed -->  Can trial bladder training at rehab once more mobile  -start on aggressive Bowel regimen   -followed by Dr Hamilton outpt Urology at [facility]    Constipation  -started on bowel rx    Bilateral lower extremity edema, Asymmetric  - Duplex negative for DVT, Echo normal   - No proteinuria , No evidence of hepatic dysfunction   - Suspect venous stasis due to venous insufficiency and poor functional state  - PT wound care consulted for leg wraps     Normocytic anemia of chronic disease  - Hgb stable     Hypokalemia  - Resolved     Hypertension  - BP stable    DVT Prophylaxis:  SQ heparin  CODE STATUS: Full Code    Disposition: I expect the patient to be discharged to short term rehab when bed available         Electronically signed by Vernell Dennis MD, 05/05/18, 7:50  PM.

## 2018-05-06 NOTE — PLAN OF CARE
Problem: Fall Risk (Adult)  Goal: Absence of Fall  Outcome: Ongoing (interventions implemented as appropriate)      Problem: Patient Care Overview  Goal: Plan of Care Review  Outcome: Ongoing (interventions implemented as appropriate)   05/06/18 0301   Coping/Psychosocial   Plan of Care Reviewed With patient;spouse   Plan of Care Review   Progress improving   OTHER   Outcome Summary pt rested well, vss, no c/o pain, no c/o n/v/d, decreased edema, will continue to monitor.     Goal: Individualization and Mutuality  Outcome: Ongoing (interventions implemented as appropriate)    Goal: Discharge Needs Assessment  Outcome: Ongoing (interventions implemented as appropriate)      Problem: Fluid Volume Deficit (Adult)  Goal: Identify Related Risk Factors and Signs and Symptoms  Outcome: Ongoing (interventions implemented as appropriate)    Goal: Optimal Fluid Balance  Outcome: Ongoing (interventions implemented as appropriate)      Problem: Skin Injury Risk (Adult)  Goal: Identify Related Risk Factors and Signs and Symptoms  Outcome: Ongoing (interventions implemented as appropriate)    Goal: Skin Health and Integrity  Outcome: Ongoing (interventions implemented as appropriate)

## 2018-05-06 NOTE — PLAN OF CARE
Problem: Patient Care Overview  Goal: Plan of Care Review   05/06/18 2818   Coping/Psychosocial   Plan of Care Reviewed With patient;spouse   Plan of Care Review   Progress improving   OTHER   Outcome Summary Patient and spouse without complaint of current compression dressing and no visible disruption or skin issue. Will continue with light compression.

## 2018-05-06 NOTE — PROGRESS NOTES
[facility] Medicine Services  PROGRESS NOTE    Patient Name: Hector Mayfield Jr.  : 1931  MRN: [id number]    Date of Admission: 2018  Length of Stay: 5  Primary Care Physician: Terrence Salazar MD    Subjective   Subjective     CC:  F/u generalized weakness     HPI:  Still no BM. Denies abd pain or distention.  Still c/o left hand pain, left hand is more edematous and slightly red due to trauma of recent falls.     Review of Systems  Gen- No fevers, chills  CV- No chest pain, palpitations  Resp- No cough, dyspnea  GI- + constipation, No N/V/D, abd pain  - urine retention, danielson placed    Otherwise ROS is negative except as mentioned in the HPI.    Objective   Objective     Vital Signs:   Temp:  [97.3 °F (36.3 °C)-98.9 °F (37.2 °C)] 97.3 °F (36.3 °C)  Heart Rate:  [55-63] 56  Resp:  [16-18] 16  BP: (122-138)/(68-79) 125/79     Physical Exam:  Constitutional:  awake, alert, chronically ill-appearing, sitting up in bed in NAD  HENT: NCAT, mucous membranes moist  Respiratory: diminished in bases bilaterally, no wheezes/rales, respiratory effort normal   Cardiovascular: RRR, II/VI systolic murmur  Gastrointestinal: Soft, nontender, nondistended  Musculoskeletal: trace to 1+ bilat LE edema stable  Psychiatric: Appropriate affect, cooperative  Skin: left hand with swollen 1-3rd MTP joints and edema of proximal fingers related to recent fall    Results Reviewed:  I have personally reviewed current lab, radiology, and data and agree.      Results from last 7 days  Lab Units 18  0517 18  0528 18  0321   WBC 10*3/mm3 7.71 6.59 6.50   HEMOGLOBIN g/dL 9.1* 8.8* 8.4*   HEMATOCRIT % 30.1* 28.5* 27.0*   PLATELETS 10*3/mm3 167 149* 158       Results from last 7 days  Lab Units 18  0502 18  0510 18  0517 18  0528 18  1240 18  0321 18   SODIUM mmol/L 142 142 142 145  --  143 140   POTASSIUM mmol/L 4.0 3.8 4.0 4.0  --  3.4* 3.8    CHLORIDE mmol/L 111* 112* 110* 112*  --  111* 106   CO2 mmol/L 26.0 25.0 27.0 26.0  --  27.0 28.0   BUN mg/dL 20 22 19 22  --  29* 35*   CREATININE mg/dL 1.00 1.10 1.10 1.30  --  1.70* 1.90*   GLUCOSE mg/dL 101* 102* 93 87  --  96 140*   CALCIUM mg/dL 8.3* 8.6* 8.7 8.6*  --  8.3* 8.8   ALT (SGPT) U/L  --   --  22 23  --   --  15   AST (SGOT) U/L  --   --  19 31  --   --  16   TROPONIN I ng/mL  --   --   --   --  0.052* 0.062* 0.040*     Estimated Creatinine Clearance: 55.5 mL/min (by C-G formula based on SCr of 1 mg/dL).      Results for orders placed during the hospital encounter of 04/30/18   Adult Transthoracic Echo Complete W/ Cont if Necessary Per Protocol    Narrative · Mild tricuspid valve regurgitation is present.  · Calculated right ventricular systolic pressure from tricuspid   regurgitation is 30 mmHg.          I have reviewed the medications.    Assessment/Plan   Assessment / Plan     Hospital Problem List     * (Principal)Generalized weakness    HTN (hypertension)    CHF (congestive heart failure)    Elevated troponin    Hyperglycemia    Elevated serum creatinine    Normocytic anemia    Spinal stenosis    Dehydration    Bilateral lower extremity edema    PVC (premature ventricular contraction)        Brief Hospital Course to date:  Hector Mayfield Jr. is a 86 y.o. male with a past medical history of chronic heart failure, BPH, hypertension, chronic constipation, and non-Hodgkin's lymphoma who presented to the [facility] emergency department late on 4/30 with progressive generalized weakness for the past month associated with recurrent falls without significant trauma, loss of consciousness, or injury.  Patient also reported increasing peripheral edema and evaluation the ER disclosed mildly elevated BNP.  Patient was admitted for further evaluation and supportive care.      Assessment & Plan:    Progressive Generalized weakness with frequent falls  Soft Tissue injuries due to fall with  left-sided pain  -No evidence of acute bony injuries on imaging   - 5/6/18 added voltaren gel and will give 3 days burst dose 20mg prednisone to help with inflammatory pain    5/4/18 episode of Afib with RVR  - converted back to NSR with IV digoxin. BP normalized.  - Cards eval'd, added metoprolol (CHADsVASc = 2, no anticoagulation recommended).    - at d/c plan to f/u with Dr. Noe in 3 months    Chronic LV Diastolic heart failure  - Echo is normal, does not appear to be decompensated, suspect his peripheral edema is due to venous insufficiency/poor functional state/[poor nutrition   - His FeNA was suggestive of intravascular dehydration     Elevated troponin  -No evidence of ACS, likely related to CRISTO, Echo normal  - Continue aspirin, no direct indication for statin (FLP reviewed)    Nonoliguric Acute kidney injury, resolved  - Likely prerenal azotemia, s/p IVF    Urinary retention, likely due to immobility and additive constipation  -started on flomax  -had been requiring intermittent I/O cath, danielson now placed -->  Can trial bladder training at rehab once more mobile  -start on aggressive Bowel regimen   -followed by Dr Hamilton outpt [facility]rology at [facility]    Constipation  -started on bowel rx, on 5/6/8 increased regimen and given mag citrate and suppository     Bilateral lower extremity edema, Asymmetric  - Duplex negative for DVT, Echo normal   - No proteinuria , No evidence of hepatic dysfunction   - Suspect venous stasis due to venous insufficiency and poor functional state  - PT wound care consulted for leg wraps     Normocytic anemia of chronic disease  - Hgb stable     Hypokalemia  - Resolved     Hypertension  - BP stable    DVT Prophylaxis:  SQ heparin  CODE STATUS: Full Code    Disposition: I expect the patient to be discharged to short term rehab when bed available         Electronically signed by Vernell Dennis MD, 05/06/18, 10:19 AM.

## 2018-05-07 NOTE — PLAN OF CARE
Problem: Patient Care Overview  Goal: Plan of Care Review  Outcome: Ongoing (interventions implemented as appropriate)   05/07/18 1421   Coping/Psychosocial   Plan of Care Reviewed With patient   Plan of Care Review   Progress improving   OTHER   Outcome Summary Pt sig decrease in pain with movements today tolerating rolling R and L in bed with Mod A; improved AROM in L UE w/o increase in pain and participation in ADL grooming tasks supine. Cont IPOT per POC

## 2018-05-07 NOTE — PROGRESS NOTES
Continued Stay Note  [facility]     Patient Name: Hector Mayfield Jr.  MRN: [id number]  Today's Date: 5/7/2018    Admit Date: 4/30/2018          Discharge Plan     Row Name 05/07/18 1505       Plan    Plan ONGOING    Patient/Family in Agreement with Plan yes    Plan Comments Met with pt at bedside and spoke with wife via phone (per request).  [facility], [facility], and [facility] are continuing to follow progress.  Pt was able to ambulate 5 ft today.  Wife has also requested a referral to [facility] (called [facility]).  CM anticipates a bed offer and potential transfer to rehab on Tues, 5/8, if medically ready.                Discharge Codes    No documentation.           Guadalupe Cook

## 2018-05-07 NOTE — PLAN OF CARE
Problem: Fall Risk (Adult)  Goal: Absence of Fall  Outcome: Ongoing (interventions implemented as appropriate)   05/07/18 0421   Fall Risk (Adult)   Absence of Fall making progress toward outcome       Problem: Patient Care Overview  Goal: Plan of Care Review  Outcome: Ongoing (interventions implemented as appropriate)   05/07/18 0421   Coping/Psychosocial   Plan of Care Reviewed With patient   Plan of Care Review   Progress no change   OTHER   Outcome Summary Pt with no acute distress overnight.       Problem: Fluid Volume Deficit (Adult)  Goal: Optimal Fluid Balance  Outcome: Ongoing (interventions implemented as appropriate)   05/07/18 0421   Fluid Volume Deficit (Adult)   Optimal Fluid Balance making progress toward outcome       Problem: Skin Injury Risk (Adult)  Goal: Skin Health and Integrity  Outcome: Ongoing (interventions implemented as appropriate)   05/07/18 0421   Skin Injury Risk (Adult)   Skin Health and Integrity making progress toward outcome

## 2018-05-07 NOTE — THERAPY TREATMENT NOTE
Acute Care - Physical Therapy Treatment Note  [facility]     Patient Name: Hector Mayfield Jr.  : 1931  MRN: [id number]  Today's Date: 2018  Onset of Illness/Injury or Date of Surgery: 18  Date of Referral to PT: 18  Referring Physician: ANGEL Hilton    Admit Date: 2018    Visit Dx:    ICD-10-CM ICD-9-CM   1. Dehydration E86.0 276.51   2. Elevated serum creatinine R79.89 790.99   3. Generalized weakness R53.1 780.79   4. Impaired mobility and activities of daily living Z74.09 799.89   5. Multiple contusions T07.XXXA 924.8   6. Impaired mobility and ADLs Z74.09 799.89   7. Impaired functional mobility, balance, gait, and endurance Z74.09 V49.89     Patient Active Problem List   Diagnosis   • HTN (hypertension)   • CHF (congestive heart failure)   • Elevated troponin   • Hyperglycemia   • Elevated serum creatinine   • Normocytic anemia   • Spinal stenosis   • Generalized weakness   • Dehydration   • Bilateral lower extremity edema   • PVC (premature ventricular contraction)       Therapy Treatment          Rehabilitation Treatment Summary     Row Name 18 1307 18 1110 18 0815       Treatment Time/Intention    Discipline physical therapist  -DM occupational therapist  -KF physical therapist  -    Document Type therapy note (daily note)  -DM therapy note (daily note)  - wound care;therapy note (daily note)  -    Subjective Information complains of;weakness;pain   startedPrednisone 5-6;nowAble to eat,helpFeedSelf,katarzyna.mvmt  -DM no complaints  - no complaints  -    Mode of Treatment individual therapy;physical therapy  -DM occupational therapy  -  --    Patient/Family Observations sup.in bed;tele,RA;IV hep locked;danielson;scuds off;waff flora,exit alarm;dtr present;issued chair alarm  -DM sup in bed; wife present  -  --    Care Plan Review care plan/treatment goals reviewed;patient/other agree to care plan  -DM care plan/treatment goals reviewed;patient/other agree  to care plan  -KF care plan/treatment goals reviewed;risks/benefits reviewed;current/potential barriers reviewed;patient/other agree to care plan  -MF    Care Plan Review, Other Participant(s) daughter  -DM spouse  -KF family  -MF    Therapy Frequency (PT Clinical Impression) daily  -DM  --  --    Patient Effort good  -DM adequate  -KF  --    Comment Nsg:can let HR DEC.to 40 beforeHoldingRx.(has decr to33 earlier)  -DM decreased pain today, tolerated increased movements and bed mobility  -KF  --    Existing Precautions/Restrictions fall   severe knee pain now eased w/Prednisone  -DM fall;other (see comments)   severe pain w/ movements  -KF  --    Treatment Considerations/Comments --   LUE/neck/B Knee pain s/p fall  -DM  --  --    Patient Response to Treatment  -- tolerated  -KF  --    Recorded by [DM] Bambi Cruz, PT 05/07/18 1439 05/07/18 1439 [KF] Fatoumata Coles, OT 05/07/18 1421 05/07/18 1421 [MF] Ricky White, PT 05/07/18 1048 05/07/18 1048    Row Name 05/07/18 1307 05/07/18 1110          Vital Signs    Pre Systolic BP Rehab 128  -  -KF     Pre Treatment Diastolic BP 69  -DM 69   RN cleared vitals stable  -KF     Intra Systolic BP Rehab 109  -DM  --     Intra Treatment Diastolic BP 64  -DM  --     Post Systolic BP Rehab 122  -DM  --     Post Treatment Diastolic BP 60  -DM  --     Pretreatment Heart Rate (beats/min) 51  -DM 42  -KF     Intratreatment Heart Rate (beats/min) 44  -DM  --     Posttreatment Heart Rate (beats/min) 48  -DM 45  -KF     Pre SpO2 (%) 95  -DM  --     O2 Delivery Pre Treatment room air  -DM room air  -KF     Intra SpO2 (%) 94  -DM  --     O2 Delivery Intra Treatment room air  -DM room air  -KF     Post SpO2 (%) 93  -DM  --     O2 Delivery Post Treatment room air  -DM room air  -KF     Pre Patient Position Supine  -DM Supine  -KF     Intra Patient Position Standing  -DM Side Lying  -KF     Post Patient Position Sitting   1250 cc  -DM Supine  -KF     Recorded by [DM] Bambi HERNANDEZ  Anthony, PT 05/07/18 1439 05/07/18 1439 [KF] Fatoumata Coles, OT 05/07/18 1421 05/07/18 1421     Row Name 05/07/18 1307 05/07/18 1110          Cognitive Assessment/Intervention    Additional Documentation Cognitive Assessment/Intervention (Group)  -DM Cognitive Assessment/Intervention (Group)  -KF     Recorded by [DM] Bambi Cruz, PT 05/07/18 1439 05/07/18 1439 [KF] Fatoumata Coles, OT 05/07/18 1421 05/07/18 1421     Row Name 05/07/18 1307 05/07/18 1110          Cognitive Assessment/Intervention- PT/OT    Affect/Mental Status (Cognitive) anxious  -DM WFL  -KF     Orientation Status (Cognition) oriented x 3  -DM oriented x 4  -KF     Follows Commands (Cognition) follows one step commands;75-90% accuracy  -DM WFL  -KF     Cognitive Function (Cognitive) memory deficit;safety deficit  -DM WFL  -KF     Memory Deficit (Cognitive) mild deficit  -DM  --     Safety Deficit (Cognitive) moderate deficit;insight into deficits/self awareness  -DM moderate deficit;safety precautions awareness;awareness of need for assistance  -KF     Cognitive Interventions (Cognitive)  -- occupation/activity based interventions;process/task specific training  -KF     Personal Safety Interventions fall prevention program maintained;gait belt;nonskid shoes/slippers when out of bed  -DM fall prevention program maintained;muscle strengthening facilitated  -KF     Recorded by [DM] Bambi Cruz, PT 05/07/18 1439 05/07/18 1439 [KF] Fatoumata Coles, OT 05/07/18 1421 05/07/18 1421     Row Name 05/07/18 1110             Safety Issues, Functional Mobility    Impairments Affecting Function (Mobility) pain;strength;postural/trunk control;endurance/activity tolerance  -KF      Comment, Safety Issues/Impairments (Mobility) unable deferred mobility to PT  -KF      Recorded by [KF] Fatoumata Coles, OT 05/07/18 1421 05/07/18 1421      Row Name 05/07/18 1307 05/07/18 1110          Bed Mobility Assessment/Treatment    Bed Mobility  Assessment/Treatment rolling right;sidelying-sit  -DM rolling left;rolling right  -KF     Rolling Left [place] (Bed Mobility)  -- moderate assist (50% patient effort)  -KF     Rolling Right [place] (Bed Mobility) moderate assist (50% patient effort)  -DM moderate assist (50% patient effort)  -KF     Sidelying-Sit [place] (Bed Mobility) verbal cues;maximum assist (25% patient effort)   unable to utilize R bedrail w/either hand   -DM  --     Bed Mobility, Safety Issues decreased use of arms for pushing/pulling;decreased use of legs for bridging/pushing;impaired trunk control for bed mobility  -DM decreased use of arms for pushing/pulling;decreased use of legs for bridging/pushing;impaired trunk control for bed mobility  -KF     Assistive Device (Bed Mobility) bed rails;draw sheet;head of bed elevated  -DM bed rails;draw sheet;head of bed elevated  -KF     Comment (Bed Mobility) grimacing w/ L knee pain  -DM no increase in pain today with bed mobility  -KF     Recorded by [DM] Bambi Cruz, PT 05/07/18 1439 05/07/18 1439 [KF] Fatoumata Coles, OT 05/07/18 1421 05/07/18 1421     Row Name 05/07/18 1110             Functional Mobility    Functional Mobility- Ind. Level unable to perform  -KF      Recorded by [KF] Fatoumata Coles, OT 05/07/18 1421 05/07/18 1421      Row Name 05/07/18 1307 05/07/18 1110          Transfer Assessment/Treatment    Transfer Assessment/Treatment sit-stand transfer;stand-sit transfer  -DM  --     Comment (Transfers)  -- def to PT  -KF     Sit-Stand [place] (Transfers) verbal cues;maximum assist (25% patient effort);2 person assist  -DM  --     Stand-Sit [place] (Transfers) verbal cues;maximum assist (25% patient effort);1 person to manage equipment  -DM  --     Recorded by [DM] Bambi Cruz, PT 05/07/18 1439 05/07/18 1439 [KF] Fatoumata Coles, OT 05/07/18 1421 05/07/18 1421     Row Name 05/07/18 1307             Sit-Stand Transfer    Assistive Device (Sit-Stand  Transfers) walker, front-wheeled  -DM      Recorded by [DM] Bambi MARY Cruz, PT 05/07/18 1439 05/07/18 1439      Row Name 05/07/18 1307             Stand-Sit Transfer    Assistive Device (Stand-Sit Transfers) walker, front-wheeled  -DM      Recorded by [DM] Bambi MARY Anthony, PT 05/07/18 1439 05/07/18 1439      Row Name 05/07/18 1307             Gait/Stairs Assessment/Training    Gait/Stairs Assessment/Training gait/ambulation independence;gait/ambulation assistive device;distance ambulated;gait pattern;gait deviations  -DM      [place] Level (Gait) verbal cues;minimum assist (75% patient effort);2 person assist  -DM      Assistive Device (Gait) walker, front-wheeled  -DM      Distance in Feet (Gait) 5   HR 48;c/o fat.; sat to rest  -DM      Pattern (Gait) step-to  -DM      Deviations/Abnormal Patterns (Gait) bilateral deviations;antalgic;base of support, narrow;mike decreased  -DM      Bilateral Gait Deviations forward flexed posture;heel strike decreased  -DM      Comment (Gait/Stairs) incr.trunk/knee flex as gt.prog;broughtChairToPt.;rolled to BS;issued loaner RWX   -DM      Recorded by [DM] Bambi Cruz, PT 05/07/18 1439 05/07/18 1439      Row Name 05/07/18 1110             ADL Assessment/Intervention    BADL Assessment/Intervention grooming  -KF      Recorded by [KF] Fatoumata Coles, OT 05/07/18 1421 05/07/18 1421      Row Name 05/07/18 1110             Grooming Assessment/Training    [place] Level (Grooming) hair care, combing/brushing;wash face, hands;set up  -KF      Grooming Position supine  -KF      Comment (Grooming) Pt I with set up of grooming items sup HOB elevated  -KF      Recorded by [KF] Fatoumata Coles, OT 05/07/18 1421 05/07/18 1421      Row Name 05/07/18 1110             BADL Safety/Performance    Impairments, BADL Safety/Performance balance;pain;range of motion;strength;trunk/postural control;endurance/activity tolerance  -KF      Skilled BADL Treatment/Intervention BADL  process/adaptation training;cognitive/safety deficit modifications;compensatory training  -KF      Progress in BADL Status improvement noted  -KF      Recorded by [KF] Fatoumata Coles, OT 05/07/18 1421 05/07/18 1421      Row Name 05/07/18 1307 05/07/18 1110          Motor Skills Assessment/Interventions    Additional Documentation Balance (Group);Balance Interventions (Group);Therapeutic Exercise (Group);Therapeutic Exercise Interventions (Group)  -DM Therapeutic Exercise (Group);Therapeutic Exercise Interventions (Group)  -KF     Recorded by [DM] Bambi Cruz, PT 05/07/18 1439 05/07/18 1439 [KF] Fatoumata Coles, OT 05/07/18 1421 05/07/18 1421     Row Name 05/07/18 1307 05/07/18 1110          Therapeutic Exercise    Therapeutic Exercise --  -DM supine, upper extremities  -KF     Additional Documentation  -- Therapeutic Exercise (Row)  -KF     Recorded by [DM] Bambi Cruz, PT 05/07/18 1439 05/07/18 1439 [KF] Fatoumata Coles, OT 05/07/18 1421 05/07/18 1421     Row Name 05/07/18 1110             Upper Extremity Supine Therapeutic Exercise    Performed, Supine Upper Extremity (Therapeutic Exercise) shoulder flexion/extension;elbow flexion/extension;shoulder abduction/adduction;other (see comments)   scapular elevation  -KF      Exercise Type, Supine Upper Extremity (Therapeutic Exercise) AROM (active range of motion);AAROM (active assistive range of motion);ADL specificity exercises  -KF      Expected Outcomes, Supine Upper Extremity (Therapeutic Exercise) improve functional tolerance, self-care activity;improve performance, BADLs;strengthen normal movement patterns;strengthen, facilitate independent active range of motion  -KF      Sets/Reps Detail, Supine Upper Extremity (Therapeutic Exercise) 1/10  -KF      Comment, Supine Upper Extremity (Therapeutic Exercise) AAROM L UE pain free; AROM R UE  -KF      Recorded by [KF] Fatoumata Coles, OT 05/07/18 1421 05/07/18 1421      Row Name 05/07/18 1307              Therapeutic Exercise    Upper Extremity Range of Motion (Therapeutic Exercise) shoulder flexion/extension, bilateral;shoulder abduction/adduction, bilateral;shoulder horizontal abduction/adduction, bilateral;elbow flexion/extension, bilateral   min asst LUE;act. RUE;joos,1/2 circles;NECK AROM  -DM      Lower Extremity Range of Motion (Therapeutic Exercise) hip flexion/extension, bilateral;hip abduction/adduction, bilateral;hip internal/external rotation, bilateral;knee flexion/extension, bilateral;ankle dorsiflexion/plantar flexion, bilateral   LAQ,SAQ,SLR,H.slides; evaristo.  -DM      Exercise Type (Therapeutic Exercise) AAROM (active assistive range of motion);AROM (active range of motion);isometric contraction, static  -DM      Position (Therapeutic Exercise) seated;supine  -DM      Sets/Reps (Therapeutic Exercise) 10  -DM      Recorded by [DM] Bambi Cruz, PT 05/07/18 1439 05/07/18 1439      Row Name 05/07/18 1307             Balance    Balance static sitting balance;static standing balance  -DM      Recorded by [DM] Bambi Cruz, PT 05/07/18 1439 05/07/18 1439      Row Name 05/07/18 1307             Static Sitting Balance    Level of [place] (Unsupported Sitting, Static Balance) minimal assist, 75% patient effort  -DM      Sitting Position (Unsupported Sitting, Static Balance) sitting on edge of bed  -DM      Time Able to Maintain Position (Unsupported Sitting, Static Balance) 4 to 5 minutes  -DM      Recorded by [DM] Bambi Cruz, PT 05/07/18 1439 05/07/18 1439      Row Name 05/07/18 1307             Dynamic Sitting Balance    Level of [place], Reaches Outside Midline (Sitting, Dynamic Balance) moderate assist, 50 to 74% patient effort  -DM      Sitting Position, Reaches Outside Midline (Sitting, Dynamic Balance) sitting on edge of bed  -DM      Comment, Reaches Outside Midline (Sitting, Dynamic Balance) scooting,PLB,LE exer  -DM      Recorded by [DM] Bambi Cruz, PT 05/07/18 1439  05/07/18 1439      Row Name 05/07/18 1307             Static Standing Balance    Level of [place] (Supported Standing, Static Balance) minimal assist, 75% patient effort  -DM      Time Able to Maintain Position (Supported Standing, Static Balance) 1 to 2 minutes  -DM      Assistive Device Utilized (Supported Standing, Static Balance) rolling walker   focus@mirror,trunkext,PLB;STS:feetBlocked to prevent sliding  -DM      Recorded by [DM] Bambi Cruz, PT 05/07/18 1439 05/07/18 1439      Row Name 05/07/18 1307 05/07/18 1110 05/07/18 0815       Positioning and Restraints    Pre-Treatment Position in bed  -DM in bed  -KF in bed  -MF    Post Treatment Position chair  -DM bed  -KF bed  -MF    In Bed  -- notified nsg;supine;fowlers;call light within reach;encouraged to call for assist;exit alarm on;with family/caregiver;LUE elevated;legs elevated  -KF supine;call light within reach  -MF    In Chair notified nsg;reclined;call light within reach;encouraged to call for assist;exit alarm on;with family/caregiver;RUE elevated;LUE elevated;on mechanical lift sling;legs elevated  -DM  --  --    Recorded by [DM] Bambi Cruz, PT 05/07/18 1439 05/07/18 1439 [KF] Fatoumata Coles, OT 05/07/18 1421 05/07/18 1421 [MF] Ricky White, PT 05/07/18 1048 05/07/18 1048    Row Name 05/07/18 1307 05/07/18 1110 05/07/18 0815       Pain Assessment    Additional Documentation Pain Scale: FACES Pre/Post-Treatment (Group)  -DM Pain Scale: Numbers Pre/Post-Treatment (Group)  -KF Pain Scale: FACES Pre/Post-Treatment (Group)  -    Recorded by [DM] Bambi Cruz, PT 05/07/18 1439 05/07/18 1439 [KF] Fatoumata Coles, OT 05/07/18 1421 05/07/18 1421 [MF] Ricky White, PT 05/07/18 1048 05/07/18 1048    Row Name 05/07/18 1307 05/07/18 1110          Pain Scale: Numbers Pre/Post-Treatment    Pain Scale: Numbers, Pretreatment 2/10  -DM 2/10  -KF     Pain Scale: Numbers, Post-Treatment 3/10  -DM 2/10  -KF     Pain Location - Side  Bilateral  -DM Left  -KF     Pain Location - Orientation  -- generalized  -KF     Pain Location knee  -DM shoulder  -KF     Pain Intervention(s) Repositioned;Rest  -DM Ambulation/increased activity;Repositioned  -KF     Recorded by [DM] Bambi Cruz, PT 05/07/18 1439 05/07/18 1439 [KF] Fatoumata Coles, OT 05/07/18 1421 05/07/18 1421     Row Name 05/07/18 0815             Pain Scale: FACES Pre/Post-Treatment    Pain: FACES Scale, Pretreatment 2-->hurts little bit  -MF      Pain: FACES Scale, Post-Treatment 2-->hurts little bit  -MF      Pre/Post Treatment Pain Comment L shoulder  -MF      Recorded by [MF] Ricky White, PT 05/07/18 1048 05/07/18 1048      Row Name 05/07/18 0815             Coping    Observed Emotional State calm  -MF      Verbalized Emotional State acceptance  -MF      Recorded by [MF] Ricky White, PT 05/07/18 1048 05/07/18 1048      Row Name 05/07/18 1110             Plan of Care Review    Plan of Care Reviewed With patient;spouse  -KF      Recorded by [KF] Fatoumata Coles, OT 05/07/18 1421 05/07/18 1421      Row Name 05/07/18 1110             Outcome Summary/Treatment Plan (OT)    Daily Summary of Progress (OT) progress toward functional goals as expected  -KF      Barriers to Overall Progress (OT) pain  -KF      Plan for Continued Treatment (OT) cont IPOT per POC  -KF      Recorded by [KF] Fatoumata Coles, OT 05/07/18 1421 05/07/18 1421      Row Name 05/07/18 1307             Outcome Summary/Treatment Plan (PT)    Daily Summary of Progress (PT) progress toward functional goals is good  -DM      Anticipated Discharge Disposition (PT) inpatient rehab facility  -DM      Recorded by [DM] Bambi Cruz, PT 05/07/18 1439 05/07/18 1439        User Key  (r) = Recorded By, (t) = Taken By, (c) = Cosigned By    Initials Name Effective Dates Discipline     Ricky White, PT 06/19/15 -  PT    DM Bambi Cruz, PT 06/19/15 -  PT    KF Fatoumata Coles, OT 04/03/18 -  OT                      Physical Therapy Education     Title: PT OT SLP Therapies (Active)     Topic: Physical Therapy (Active)     Point: Mobility training (Active)    Learning Progress Summary     Learner Status Readiness Method Response Comment Documented by    Patient Active Eager E,D NR  DM 05/07/18 1439     Active Eager E,D NR  DM 05/04/18 1519     Active Acceptance E,D NR  DM 05/02/18 1249    Family Active Eager E,D NR  DM 05/07/18 1439     Active Eager E,D NR  DM 05/04/18 1519    Significant Other Active Acceptance E,D NR  DM 05/02/18 1249          Point: Home exercise program (Active)    Learning Progress Summary     Learner Status Readiness Method Response Comment Documented by    Patient Active Eager E,D NR  DM 05/07/18 1439     Active Eager E,D NR  DM 05/04/18 1519     Active Acceptance E,D NR  DM 05/02/18 1249    Family Active Eager E,D NR  DM 05/07/18 1439     Active Eager E,D NR  DM 05/04/18 1519    Significant Other Active Acceptance E,D NR  DM 05/02/18 1249          Point: Body mechanics (Active)    Learning Progress Summary     Learner Status Readiness Method Response Comment Documented by    Patient Active Eager E,D NR  DM 05/07/18 1439     Active Eager E,D NR  DM 05/04/18 1519     Active Acceptance E,D NR  DM 05/02/18 1249    Family Active Eager E,D NR  DM 05/07/18 1439     Active Eager E,D NR  DM 05/04/18 1519    Significant Other Active Acceptance E,D NR  DM 05/02/18 1249          Point: Precautions (Active)    Learning Progress Summary     Learner Status Readiness Method Response Comment Documented by    Patient Active Eager E,D NR  DM 05/07/18 1439     Active Eager E,D NR  DM 05/04/18 1519     Active Acceptance E,D NR  DM 05/02/18 1249    Family Active Eager E,D NR  DM 05/07/18 1439     Active Eager E,D NR  DM 05/04/18 1519    Significant Other Active Acceptance E,D NR  DM 05/02/18 1249                      User Key     Initials Effective Dates Name Provider Type Discipline    DM 06/19/15 -  Bambi HERNANDEZ  Anthony, PT Physical Therapist PT                    PT Recommendation and Plan  Anticipated Discharge Disposition (PT): inpatient rehab facility  Planned Therapy Interventions (PT Eval): balance training, bed mobility training, gait training, home exercise program, patient/family education, strengthening, transfer training  Therapy Frequency (PT Clinical Impression): daily  Outcome Summary/Treatment Plan (PT)  Daily Summary of Progress (PT): progress toward functional goals is good  Anticipated Discharge Disposition (PT): inpatient rehab facility  Plan of Care Reviewed With: patient, daughter  Progress: improving  Outcome Summary: Signif improved katarzyna.for mobil once Prednisone started 5-6;able to amb 5 ft w/R wx & min 2 asst,but limited by  decr.HR to 44, orthostat BP.changes,incr.knee pain, & fatigue          Outcome Measures     Row Name 05/07/18 1307 05/07/18 1110 05/04/18 1447       How much help from another person do you currently need...    Turning from your back to your side while in flat bed without using bedrails? 2  -DM  -- 2  -DM    Moving from lying on back to sitting on the side of a flat bed without bedrails? 2  -DM  -- 2  -DM    Moving to and from a bed to a chair (including a wheelchair)? 2  -DM  -- 1  -DM    Standing up from a chair using your arms (e.g., wheelchair, bedside chair)? 2  -DM  -- 1  -DM    Climbing 3-5 steps with a railing? 1  -DM  -- 1  -DM    To walk in hospital room? 3  -DM  -- 1  -DM    AM-PAC 6 Clicks Score 12  -DM  -- 8  -DM       How much help from another is currently needed...    Putting on and taking off regular lower body clothing?  -- 2  -KF  --    Bathing (including washing, rinsing, and drying)  -- 2  -KF  --    Toileting (which includes using toilet bed pan or urinal)  -- 2  -KF  --    Putting on and taking off regular upper body clothing  -- 2  -KF  --    Taking care of personal grooming (such as brushing teeth)  -- 3  -KF  --    Eating meals  -- 3  -KF  --    Score   -- 14  -KF  --       Functional Assessment    Outcome Measure Options AM-PAC 6 Clicks Basic Mobility (PT)  -DM AM-PAC 6 Clicks Daily Activity (OT)  -KF AM-PAC 6 Clicks Basic Mobility (PT)  -DM      User Key  (r) = Recorded By, (t) = Taken By, (c) = Cosigned By    Initials Name Provider Type     Bambi Cruz, PT Physical Therapist    KF Fatoumata Coles, OT Occupational Therapist           Time Calculation:         PT Charges     Row Name 05/07/18 1445 05/07/18 0815          Time Calculation    Start Time 1307  -DM 0815  -     PT Received On 05/07/18  -DM  --     PT Goal Re-Cert Due Date 05/12/18  -DM 05/12/18  -        Time Calculation- PT    Total Timed Code Minutes- PT 58 minute(s)  -DM 10 minute(s)  -       User Key  (r) = Recorded By, (t) = Taken By, (c) = Cosigned By    Initials Name Provider Type     Ricky White, PT Physical Therapist     Bambi Cruz, PT Physical Therapist          Therapy Charges for Today     Code Description Service Date Service Provider Modifiers Qty    83082610161 HC PT THER PROC EA 15 MIN 5/7/2018 Bambi Cruz, PT GP 3    87222353749 HC GAIT TRAINING EA 15 MIN 5/7/2018 Bambi Cruz, PT GP 1    12175888069 HC PT THER SUPP EA 15 MIN 5/7/2018 Bambi Cruz, PT GP 2          PT G-Codes  Outcome Measure Options: AM-PAC 6 Clicks Basic Mobility (PT)    Bambi Cruz, PT  5/7/2018

## 2018-05-07 NOTE — THERAPY WOUND CARE TREATMENT
Acute Care - Wound/Debridement Treatment Note  [facility]     Patient Name: Hector Mayfield Jr.  : 1931  MRN: [id number]  Today's Date: 2018  Onset of Illness/Injury or Date of Surgery: 18   Date of Referral to PT: 18   Referring Physician: ANGEL Hilton       Admit Date: 2018    Visit Dx:    ICD-10-CM ICD-9-CM   1. Dehydration E86.0 276.51   2. Elevated serum creatinine R79.89 790.99   3. Generalized weakness R53.1 780.79   4. Impaired mobility and activities of daily living Z74.09 799.89   5. Multiple contusions T07.XXXA 924.8   6. Impaired mobility and ADLs Z74.09 799.89   7. Impaired functional mobility, balance, gait, and endurance Z74.09 V49.89       Patient Active Problem List   Diagnosis   • HTN (hypertension)   • CHF (congestive heart failure)   • Elevated troponin   • Hyperglycemia   • Elevated serum creatinine   • Normocytic anemia   • Spinal stenosis   • Generalized weakness   • Dehydration   • Bilateral lower extremity edema   • PVC (premature ventricular contraction)                    Lymphedema     Row Name 18 0815             Compression/Skin Care    Wrapping Comments BLE wraps intact with no issues noted, B heels intact and blanchable.   -        User Key  (r) = Recorded By, (t) = Taken By, (c) = Cosigned By    Initials Name Provider Type    REMY White, PT Physical Therapist          WOUND DEBRIDEMENT                   Therapy Treatment          Rehabilitation Treatment Summary     Row Name 18 0815             Treatment Time/Intention    Discipline physical therapist  -MF      Document Type wound care;therapy note (daily note)  -      Subjective Information no complaints  -      Care Plan Review care plan/treatment goals reviewed;risks/benefits reviewed;current/potential barriers reviewed;patient/other agree to care plan  -      Care Plan Review, Other Participant(s) family  -MF      Recorded by [MF] Ricky White, PT 18 1045  05/07/18 1048      Row Name 05/07/18 0815             Positioning and Restraints    Pre-Treatment Position in bed  -MF      Post Treatment Position bed  -MF      In Bed supine;call light within reach  -MF      Recorded by [MF] Ricky White, PT 05/07/18 1048 05/07/18 1048      Row Name 05/07/18 0815             Pain Assessment    Additional Documentation Pain Scale: FACES Pre/Post-Treatment (Group)  -MF      Recorded by [MF] Ricky White, PT 05/07/18 1048 05/07/18 1048      Row Name 05/07/18 0815             Pain Scale: FACES Pre/Post-Treatment    Pain: FACES Scale, Pretreatment 2-->hurts little bit  -MF      Pain: FACES Scale, Post-Treatment 2-->hurts little bit  -MF      Pre/Post Treatment Pain Comment L shoulder  -MF      Recorded by [MF] Ricky White, PT 05/07/18 1048 05/07/18 1048      Row Name 05/07/18 0815             Coping    Observed Emotional State calm  -MF      Verbalized Emotional State acceptance  -MF      Recorded by [MF] Ricky White, PT 05/07/18 1048 05/07/18 1048        User Key  (r) = Recorded By, (t) = Taken By, (c) = Cosigned By    Initials Name Effective Dates Discipline     Ricky White, PT 06/19/15 -  PT              Physical Therapy Education     Title: PT OT SLP Therapies (Active)     Topic: Physical Therapy (Active)     Point: Mobility training (Active)    Learning Progress Summary     Learner Status Readiness Method Response Comment Documented by    Patient Active Eager E,D NR  DM 05/04/18 1519     Active Acceptance E,D NR  DM 05/02/18 1249    Family Active Eager E,D NR  DM 05/04/18 1519    Significant Other Active Acceptance E,D NR  DM 05/02/18 1249          Point: Home exercise program (Active)    Learning Progress Summary     Learner Status Readiness Method Response Comment Documented by    Patient Active Eager E,D NR  DM 05/04/18 1519     Active Acceptance E,D NR  DM 05/02/18 1249    Family Active Eager E,D NR  DM 05/04/18 1519    Significant Other Active  Acceptance E,D NR  DM 05/02/18 1249          Point: Body mechanics (Active)    Learning Progress Summary     Learner Status Readiness Method Response Comment Documented by    Patient Active Eager E,D NR  DM 05/04/18 1519     Active Acceptance E,D NR  DM 05/02/18 1249    Family Active Eager E,D NR  DM 05/04/18 1519    Significant Other Active Acceptance E,D NR  DM 05/02/18 1249          Point: Precautions (Active)    Learning Progress Summary     Learner Status Readiness Method Response Comment Documented by    Patient Active Eager E,D NR  DM 05/04/18 1519     Active Acceptance E,D NR  DM 05/02/18 1249    Family Active Eager E,D NR  DM 05/04/18 1519    Significant Other Active Acceptance E,D NR  DM 05/02/18 1249                      User Key     Initials Effective Dates Name Provider Type Discipline     06/19/15 -  Bambi Cruz, PT Physical Therapist PT                      PT Recommendation and Plan  Anticipated Discharge Disposition (PT): inpatient rehab facility  Planned Therapy Interventions (PT Eval): balance training, bed mobility training, gait training, home exercise program, patient/family education, strengthening, transfer training  Therapy Frequency (PT Clinical Impression): daily               Outcome Summary: BLE compression wraps intact with no issues noted. PT to change compression wraps tomorrow.   Plan of Care Reviewed With: patient, family          Outcome Measures     Row Name 05/04/18 5742             How much help from another person do you currently need...    Turning from your back to your side while in flat bed without using bedrails? 2  -DM      Moving from lying on back to sitting on the side of a flat bed without bedrails? 2  -DM      Moving to and from a bed to a chair (including a wheelchair)? 1  -DM      Standing up from a chair using your arms (e.g., wheelchair, bedside chair)? 1  -DM      Climbing 3-5 steps with a railing? 1  -DM      To walk in hospital room? 1  -DM      AM-PAC 6  Clicks Score 8  -DM         Functional Assessment    Outcome Measure Options AM-PAC 6 Clicks Basic Mobility (PT)  -DM        User Key  (r) = Recorded By, (t) = Taken By, (c) = Cosigned By    Initials Name Provider Type     Bambi Cruz, PT Physical Therapist              Time Calculation        PT Charges     Row Name 05/07/18 0815             Time Calculation    Start Time 0815  -      PT Goal Re-Cert Due Date 05/12/18  -         Time Calculation- PT    Total Timed Code Minutes- PT 10 minute(s)  -        User Key  (r) = Recorded By, (t) = Taken By, (c) = Cosigned By    Initials Name Provider Type     Ricky White, PT Physical Therapist             Therapy Charges for Today     Code Description Service Date Service Provider Modifiers Qty    88645135543 HC PT THER PROC EA 15 MIN 5/7/2018 Ricky White, PT GP 1            PT G-Codes  Outcome Measure Options: AM-PAC 6 Clicks Basic Mobility (PT)        Ricky White, PT  5/7/2018

## 2018-05-07 NOTE — PROGRESS NOTES
[facility] Medicine Services  PROGRESS NOTE    Patient Name: Hector Mayfield Jr.  : 1931  MRN: [id number]    Date of Admission: 2018  Length of Stay: 6  Primary Care Physician: Terrence Salazar MD    Subjective   Subjective     CC:  F/u generalized weakness     HPI:  Good BM's after regimen yesterday, pt is happy.  Left hand/arm pain much improved with low dose steroid and voltaren gel.     Review of Systems  Gen- No fevers, chills  CV- No chest pain, palpitations  Resp- No cough, dyspnea  GI- + constipation, No N/V/D, abd pain  - urine retention, danielson placed    Otherwise ROS is negative except as mentioned in the HPI.    Objective   Objective     Vital Signs:   Temp:  [97.6 °F (36.4 °C)-99.1 °F (37.3 °C)] 97.6 °F (36.4 °C)  Heart Rate:  [46-86] 51  Resp:  [18] 18  BP: (110-128)/(62-85) 128/69     Physical Exam:  Constitutional:  awake, alert, chronically ill-appearing, sitting up in bed in NAD  HENT: NCAT, mucous membranes moist  Respiratory: diminished in bases bilaterally, no wheezes/rales, respiratory effort normal   Cardiovascular: RRR, II/VI systolic murmur  Gastrointestinal: Soft, nontender, nondistended  Musculoskeletal: trace to 1+ bilat LE edema stable  Psychiatric: Appropriate affect, cooperative  Skin: left hand with swollen 1-3rd MTP joints and edema of proximal fingers related to recent fall but marked improved compared to ~18    Results Reviewed:  I have personally reviewed current lab, radiology, and data and agree.      Results from last 7 days  Lab Units 18  0517 18  0528 18  0321   WBC 10*3/mm3 7.71 6.59 6.50   HEMOGLOBIN g/dL 9.1* 8.8* 8.4*   HEMATOCRIT % 30.1* 28.5* 27.0*   PLATELETS 10*3/mm3 167 149* 158       Results from last 7 days  Lab Units 18  0502 18  0510 18  0517 18  0528 18  1240 18  0321 18   SODIUM mmol/L 142 142 142 145  --  143 140   POTASSIUM mmol/L 4.0 3.8 4.0 4.0   --  3.4* 3.8   CHLORIDE mmol/L 111* 112* 110* 112*  --  111* 106   CO2 mmol/L 26.0 25.0 27.0 26.0  --  27.0 28.0   BUN mg/dL 20 22 19 22  --  29* 35*   CREATININE mg/dL 1.00 1.10 1.10 1.30  --  1.70* 1.90*   GLUCOSE mg/dL 101* 102* 93 87  --  96 140*   CALCIUM mg/dL 8.3* 8.6* 8.7 8.6*  --  8.3* 8.8   ALT (SGPT) U/L  --   --  22 23  --   --  15   AST (SGOT) U/L  --   --  19 31  --   --  16   TROPONIN I ng/mL  --   --   --   --  0.052* 0.062* 0.040*     Estimated Creatinine Clearance: 55.3 mL/min (by C-G formula based on SCr of 1 mg/dL).      Results for orders placed during the hospital encounter of 04/30/18   Adult Transthoracic Echo Complete W/ Cont if Necessary Per Protocol    Narrative · Mild tricuspid valve regurgitation is present.  · Calculated right ventricular systolic pressure from tricuspid   regurgitation is 30 mmHg.          I have reviewed the medications.    Assessment/Plan   Assessment / Plan     Hospital Problem List     * (Principal)Generalized weakness    HTN (hypertension)    CHF (congestive heart failure)    Elevated troponin    Hyperglycemia    Elevated serum creatinine    Normocytic anemia    Spinal stenosis    Dehydration    Bilateral lower extremity edema    PVC (premature ventricular contraction)        Brief Hospital Course to date:  Hector Mayfield Jr. is a 86 y.o. male with a past medical history of chronic heart failure, BPH, hypertension, chronic constipation, and non-Hodgkin's lymphoma who presented to the [facility] emergency department late on 4/30 with progressive generalized weakness for the past month associated with recurrent falls without significant trauma, loss of consciousness, or injury.  Patient also reported increasing peripheral edema and evaluation the ER disclosed mildly elevated BNP.  Patient was admitted for further evaluation and supportive care.      Assessment & Plan:    Progressive Generalized weakness with frequent falls  Soft Tissue injuries due to  fall with left-sided pain  -No evidence of acute bony injuries on imaging   - 5/6/18 added voltaren gel and will give 5 days burst dose 20mg prednisone to help with inflammatory pain    5/4/18 episode of Afib with RVR  - converted back to NSR with IV digoxin. BP normalized.  - Cards eval'd, added metoprolol (CHADsVASc = 2, no anticoagulation recommended).    - at d/c plan to f/u with Dr. Noe in 3 months    Chronic LV Diastolic heart failure  - Echo is normal, does not appear to be decompensated, suspect his peripheral edema is due to venous insufficiency/poor functional state/[poor nutrition   - His FeNA was suggestive of intravascular dehydration     Elevated troponin  -No evidence of ACS, likely related to CRISTO, Echo normal  - Continue aspirin, no direct indication for statin (FLP reviewed)    Nonoliguric Acute kidney injury, resolved  - Likely prerenal azotemia, s/p IVF    Urinary retention, likely due to immobility and additive constipation  -started on flomax  -had been requiring intermittent I/O cath, danielson now placed -->  Can trial bladder training at rehab once more mobile  -start on aggressive Bowel regimen   -followed by Dr Hamilton outpt Urology at [facility]    Constipation, resolved  -started on bowel rx, on 5/6/8 increased regimen and given mag citrate and suppository     Bilateral lower extremity edema, Asymmetric  - Duplex negative for DVT, Echo normal   - No proteinuria , No evidence of hepatic dysfunction   - Suspect venous stasis due to venous insufficiency and poor functional state  - PT wound care consulted for leg wraps     Normocytic anemia of chronic disease  - Hgb stable     Hypokalemia  - Resolved     Hypertension  - BP stable    DVT Prophylaxis:  SQ heparin  CODE STATUS: Full Code    Disposition: I expect the patient to be discharged to short term rehab when bed available         Electronically signed by Vernell Dennis MD, 05/07/18, 5:28 PM.

## 2018-05-07 NOTE — PLAN OF CARE
Problem: Patient Care Overview  Goal: Plan of Care Review  Outcome: Ongoing (interventions implemented as appropriate)   05/07/18 9889   Coping/Psychosocial   Plan of Care Reviewed With patient;daughter   Plan of Care Review   Progress improving   OTHER   Outcome Summary Signif improved katarzyna.for mobil once Prednisone started 5-6;able to amb 5 ft w/R wx & min 2 asst,but limited by decr.HR to 44, orthostat BP.changes,incr.knee pain, & fatigue

## 2018-05-07 NOTE — PLAN OF CARE
Problem: Patient Care Overview  Goal: Plan of Care Review  Outcome: Ongoing (interventions implemented as appropriate)   05/07/18 0810   Coping/Psychosocial   Plan of Care Reviewed With patient;family   OTHER   Outcome Summary BLE compression wraps intact with no issues noted. PT to change compression wraps tomorrow.

## 2018-05-07 NOTE — THERAPY TREATMENT NOTE
Acute Care - Occupational Therapy Treatment Note  [facility]     Patient Name: Hector Mayfield Jr.  : 1931  MRN: [id number]  Today's Date: 2018  Onset of Illness/Injury or Date of Surgery: 18  Date of Referral to OT: 18  Referring Physician: ANGEL Hilton    Admit Date: 2018       ICD-10-CM ICD-9-CM   1. Dehydration E86.0 276.51   2. Elevated serum creatinine R79.89 790.99   3. Generalized weakness R53.1 780.79   4. Impaired mobility and activities of daily living Z74.09 799.89   5. Multiple contusions T07.XXXA 924.8   6. Impaired mobility and ADLs Z74.09 799.89   7. Impaired functional mobility, balance, gait, and endurance Z74.09 V49.89     Patient Active Problem List   Diagnosis   • HTN (hypertension)   • CHF (congestive heart failure)   • Elevated troponin   • Hyperglycemia   • Elevated serum creatinine   • Normocytic anemia   • Spinal stenosis   • Generalized weakness   • Dehydration   • Bilateral lower extremity edema   • PVC (premature ventricular contraction)     Past Medical History:   Diagnosis Date   • Congestive heart failure (CHF)    • Hypertension    • Non Hodgkin's lymphoma      Past Surgical History:   Procedure Laterality Date   • HERNIA REPAIR     • PROSTATE SURGERY         Therapy Treatment          Rehabilitation Treatment Summary     Row Name 18 1307 18 1110 18 0815       Treatment Time/Intention    Discipline (P)  physical therapist  -DM occupational therapist  -KF physical therapist  -    Document Type (P)  therapy note (daily note)  -DM therapy note (daily note)  -KF wound care;therapy note (daily note)  -    Subjective Information (P)  complains of;weakness;pain   startedPrednisone 5-6;nowAble to eat,helpFeedSelf,katarzyna.mvmt  -DM no complaints  -KF no complaints  -    Mode of Treatment (P)  individual therapy;physical therapy  -DM occupational therapy  -KF  --    Patient/Family Observations (P)  sup.in bed;tele,RA;IV hep locked;danielson;scdinorahs  off;solomon flora,exit alarm;dtr present  -DM sup in bed; wife present  -KF  --    Care Plan Review (P)  care plan/treatment goals reviewed;patient/other agree to care plan  -DM care plan/treatment goals reviewed;patient/other agree to care plan  -KF care plan/treatment goals reviewed;risks/benefits reviewed;current/potential barriers reviewed;patient/other agree to care plan  -MF    Care Plan Review, Other Participant(s) (P)  daughter  -DM spouse  -KF family  -MF    Therapy Frequency (PT Clinical Impression) (P)  daily  -DM  --  --    Patient Effort (P)  good  -DM adequate  -KF  --    Comment (P)  Nsg:can let HR DEC.to 40 beforeHoldingRx.(has decr to33 earlier)  -DM decreased pain today, tolerated increased movements and bed mobility  -KF  --    Existing Precautions/Restrictions (P)  fall   severe knee pain now eased w/Prednisone  -DM fall;other (see comments)   severe pain w/ movements  -KF  --    Treatment Considerations/Comments (P)  --   LUE/neck/B Knee pain s/p fall  -DM  --  --    Patient Response to Treatment  -- tolerated  -KF  --    Recorded by [DM] Bambi Cruz, PT 05/07/18 1424 05/07/18 1424 [KF] Fatoumata Coles, OT 05/07/18 1421 05/07/18 1421 [MF] Ricky White, PT 05/07/18 1048 05/07/18 1048    Row Name 05/07/18 1307 05/07/18 1110          Vital Signs    Pre Systolic BP Rehab (P)  128  -  -KF     Pre Treatment Diastolic BP (P)  69  -DM 69   RN cleared vitals stable  -KF     Intra Systolic BP Rehab (P)  109  -DM  --     Intra Treatment Diastolic BP (P)  64  -DM  --     Post Systolic BP Rehab (P)  122  -DM  --     Post Treatment Diastolic BP (P)  60  -DM  --     Pretreatment Heart Rate (beats/min) (P)  51  -DM 42  -KF     Intratreatment Heart Rate (beats/min) (P)  44  -DM  --     Posttreatment Heart Rate (beats/min) (P)  48  -DM 45  -KF     Pre SpO2 (%) (P)  95  -DM  --     O2 Delivery Pre Treatment (P)  room air  -DM room air  -KF     Intra SpO2 (%) (P)  94  -DM  --     O2 Delivery Intra  Treatment (P)  room air  -DM room air  -KF     Post SpO2 (%) (P)  93  -DM  --     O2 Delivery Post Treatment (P)  room air  -DM room air  -KF     Pre Patient Position (P)  Supine  -DM Supine  -KF     Intra Patient Position (P)  Standing  -DM Side Lying  -KF     Post Patient Position (P)  Sitting   1250 cc  -DM Supine  -KF     Recorded by [DM] Bambi Cruz, PT 05/07/18 1424 05/07/18 1424 [KF] Fatoumata Coles, OT 05/07/18 1421 05/07/18 1421     Row Name 05/07/18 1307 05/07/18 1110          Cognitive Assessment/Intervention    Additional Documentation (P)  Cognitive Assessment/Intervention (Group)  -DM Cognitive Assessment/Intervention (Group)  -KF     Recorded by [DM] Bambi Cruz, PT 05/07/18 1424 05/07/18 1424 [KF] Fatoumata Coles, OT 05/07/18 1421 05/07/18 1421     Row Name 05/07/18 1307 05/07/18 1110          Cognitive Assessment/Intervention- PT/OT    Affect/Mental Status (Cognitive) (P)  anxious  -DM WFL  -KF     Orientation Status (Cognition) (P)  oriented x 3  -DM oriented x 4  -KF     Follows Commands (Cognition) (P)  follows one step commands;75-90% accuracy  -DM WFL  -KF     Cognitive Function (Cognitive) (P)  memory deficit;safety deficit  -DM WFL  -KF     Safety Deficit (Cognitive)  -- moderate deficit;safety precautions awareness;awareness of need for assistance  -KF     Cognitive Interventions (Cognitive)  -- occupation/activity based interventions;process/task specific training  -KF     Personal Safety Interventions  -- fall prevention program maintained;muscle strengthening facilitated  -KF     Recorded by [DM] Bambi Cruz, PT 05/07/18 1424 05/07/18 1424 [KF] Fatoumata Coles, OT 05/07/18 1421 05/07/18 1421     Row Name 05/07/18 1110             Safety Issues, Functional Mobility    Impairments Affecting Function (Mobility) pain;strength;postural/trunk control;endurance/activity tolerance  -KF      Comment, Safety Issues/Impairments (Mobility) unable deferred mobility to PT  -KF       Recorded by [KF] Fatoumata Coles, OT 05/07/18 1421 05/07/18 1421      Row Name 05/07/18 1110             Bed Mobility Assessment/Treatment    Bed Mobility Assessment/Treatment rolling left;rolling right  -KF      Rolling Left [place] (Bed Mobility) moderate assist (50% patient effort)  -KF      Rolling Right [place] (Bed Mobility) moderate assist (50% patient effort)  -KF      Bed Mobility, Safety Issues decreased use of arms for pushing/pulling;decreased use of legs for bridging/pushing;impaired trunk control for bed mobility  -KF      Assistive Device (Bed Mobility) bed rails;draw sheet;head of bed elevated  -KF      Comment (Bed Mobility) no increase in pain today with bed mobility  -KF      Recorded by [KF] Fatoumata Coles, OT 05/07/18 1421 05/07/18 1421      Row Name 05/07/18 1110             Functional Mobility    Functional Mobility- Ind. Level unable to perform  -KF      Recorded by [KF] Fatoumata Coles, OT 05/07/18 1421 05/07/18 1421      Row Name 05/07/18 1110             Transfer Assessment/Treatment    Comment (Transfers) def to PT  -KF      Recorded by [KF] Fatoumata Coles, OT 05/07/18 1421 05/07/18 1421      Row Name 05/07/18 1110             ADL Assessment/Intervention    BADL Assessment/Intervention grooming  -KF      Recorded by [KF] Fatoumata Coles, OT 05/07/18 1421 05/07/18 1421      Row Name 05/07/18 1110             Grooming Assessment/Training    [place] Level (Grooming) hair care, combing/brushing;wash face, hands;set up  -KF      Grooming Position supine  -KF      Comment (Grooming) Pt I with set up of grooming items sup HOB elevated  -KF      Recorded by [KF] Fatoumata Coles, OT 05/07/18 1421 05/07/18 1421      Row Name 05/07/18 1110             BADL Safety/Performance    Impairments, BADL Safety/Performance balance;pain;range of motion;strength;trunk/postural control;endurance/activity tolerance  -KF      Skilled BADL Treatment/Intervention BADL process/adaptation  training;cognitive/safety deficit modifications;compensatory training  -KF      Progress in BADL Status improvement noted  -KF      Recorded by [KF] Fatoumata Coles, OT 05/07/18 1421 05/07/18 1421      Row Name 05/07/18 1110             Motor Skills Assessment/Interventions    Additional Documentation Therapeutic Exercise (Group);Therapeutic Exercise Interventions (Group)  -KF      Recorded by [KF] Fatoumata Coles, OT 05/07/18 1421 05/07/18 1421      Row Name 05/07/18 1110             Therapeutic Exercise    Therapeutic Exercise supine, upper extremities  -KF      Additional Documentation Therapeutic Exercise (Row)  -KF      Recorded by [KF] Fatoumata Coles, OT 05/07/18 1421 05/07/18 1421      Row Name 05/07/18 1110             Upper Extremity Supine Therapeutic Exercise    Performed, Supine Upper Extremity (Therapeutic Exercise) shoulder flexion/extension;elbow flexion/extension;shoulder abduction/adduction;other (see comments)   scapular elevation  -KF      Exercise Type, Supine Upper Extremity (Therapeutic Exercise) AROM (active range of motion);AAROM (active assistive range of motion);ADL specificity exercises  -KF      Expected Outcomes, Supine Upper Extremity (Therapeutic Exercise) improve functional tolerance, self-care activity;improve performance, BADLs;strengthen normal movement patterns;strengthen, facilitate independent active range of motion  -KF      Sets/Reps Detail, Supine Upper Extremity (Therapeutic Exercise) 1/10  -KF      Comment, Supine Upper Extremity (Therapeutic Exercise) AAROM L UE pain free; AROM R UE  -KF      Recorded by [KF] Fatoumata Coles, OT 05/07/18 1421 05/07/18 1421      Row Name 05/07/18 1110 05/07/18 0815          Positioning and Restraints    Pre-Treatment Position in bed  -KF in bed  -     Post Treatment Position bed  -KF bed  -     In Bed notified nsg;supine;fowlers;call light within reach;encouraged to call for assist;exit alarm on;with family/caregiver;MILA  elevated;legs elevated  -KF supine;call light within reach  -MF     Recorded by [KF] Fatoumata Coles, OT 05/07/18 1421 05/07/18 1421 [MF] Ricky White, PT 05/07/18 1048 05/07/18 1048     Row Name 05/07/18 1110 05/07/18 0815          Pain Assessment    Additional Documentation Pain Scale: Numbers Pre/Post-Treatment (Group)  -KF Pain Scale: FACES Pre/Post-Treatment (Group)  -MF     Recorded by [KF] Fatoumata Coles, OT 05/07/18 1421 05/07/18 1421 [MF] Ricky White, PT 05/07/18 1048 05/07/18 1048     Row Name 05/07/18 1110             Pain Scale: Numbers Pre/Post-Treatment    Pain Scale: Numbers, Pretreatment 2/10  -KF      Pain Scale: Numbers, Post-Treatment 2/10  -KF      Pain Location - Side Left  -KF      Pain Location - Orientation generalized  -KF      Pain Location shoulder  -KF      Pain Intervention(s) Ambulation/increased activity;Repositioned  -KF      Recorded by [KF] Fatoumata Coles, OT 05/07/18 1421 05/07/18 1421      Row Name 05/07/18 0815             Pain Scale: FACES Pre/Post-Treatment    Pain: FACES Scale, Pretreatment 2-->hurts little bit  -MF      Pain: FACES Scale, Post-Treatment 2-->hurts little bit  -MF      Pre/Post Treatment Pain Comment L shoulder  -MF      Recorded by [MF] Ricky White, PT 05/07/18 1048 05/07/18 1048      Row Name 05/07/18 0815             Coping    Observed Emotional State calm  -MF      Verbalized Emotional State acceptance  -MF      Recorded by [MF] Ricky White, PT 05/07/18 1048 05/07/18 1048      Row Name 05/07/18 1110             Plan of Care Review    Plan of Care Reviewed With patient;spouse  -KF      Recorded by [KF] Fatoumata Coles, OT 05/07/18 1421 05/07/18 1421      Row Name 05/07/18 1110             Outcome Summary/Treatment Plan (OT)    Daily Summary of Progress (OT) progress toward functional goals as expected  -KF      Barriers to Overall Progress (OT) pain  -KF      Plan for Continued Treatment (OT) cont IPOT per POC  -KF       Recorded by [KF] Fatoumata Coles, OT 05/07/18 1421 05/07/18 1421        User Key  (r) = Recorded By, (t) = Taken By, (c) = Cosigned By    Initials Name Effective Dates Discipline    MF Ricky White, PT 06/19/15 -  PT    IAN Cruz, PT 06/19/15 -  PT    KF Fatoumata Coles, OT 04/03/18 -  OT               Occupational Therapy Education     Title: PT OT SLP Therapies (Active)     Topic: Occupational Therapy (Active)     Point: ADL training (Active)     Description: Instruct learner(s) on proper safety adaptation and remediation techniques during self care or transfers.   Instruct in proper use of assistive devices.   Learning Progress Summary     Learner Status Readiness Method Response Comment Documented by    Patient Done Acceptance E VU Pt educated on HEP for BUE, body mechanics for bed mobility, and purpose of OT services today  05/07/18 1423     Active Acceptance E,D NR   05/02/18 1249     Done Acceptance E,D VU,NR Education intitiated for benefits of OOB activity/therapy, role of OT, ROM HEP to support ADLs, and recommendation for IRF at d/c  05/01/18 1524    Family Done Acceptance E VU Pt educated on HEP for BUE, body mechanics for bed mobility, and purpose of OT services today  05/07/18 1423     Done Acceptance E,D VU,NR Education intitiated for benefits of OOB activity/therapy, role of OT, ROM HEP to support ADLs, and recommendation for IRF at d/c  05/01/18 1524    Significant Other Active Acceptance E,D NR   05/02/18 1249          Point: Home exercise program (Active)     Description: Instruct learner(s) on appropriate technique for monitoring, assisting and/or progressing therapeutic exercises/activities.   Learning Progress Summary     Learner Status Readiness Method Response Comment Documented by    Patient Done Acceptance E VU Pt educated on HEP for BUE, body mechanics for bed mobility, and purpose of OT services today  05/07/18 1423     Active Acceptance E NR Pt educated on  HEP for AAROM and AAROM BUE to improve activity tolerance for ADLs while in supine  05/02/18 1439     Active Acceptance E,D NR   05/02/18 1249     Done Acceptance E,D VU,NR Education intitiated for benefits of OOB activity/therapy, role of OT, ROM HEP to support ADLs, and recommendation for IRF at d/c  05/01/18 1524    Family Done Acceptance E VU Pt educated on HEP for BUE, body mechanics for bed mobility, and purpose of OT services today  05/07/18 1423     Active Acceptance E NR Pt educated on HEP for AAROM and AAROM BUE to improve activity tolerance for ADLs while in supine  05/02/18 1439     Done Acceptance E,D VU,NR Education intitiated for benefits of OOB activity/therapy, role of OT, ROM HEP to support ADLs, and recommendation for IRF at d/c  05/01/18 1524    Significant Other Active Acceptance E,D NR   05/02/18 1249                      User Key     Initials Effective Dates Name Provider Type Discipline     06/22/15 -  Francisca Lopez, OT Occupational Therapist OT     06/19/15 -  Bambi Cruz, PT Physical Therapist PT     04/03/18 -  Fatoumata Coles, OT Occupational Therapist OT                OT Recommendation and Plan  Outcome Summary/Treatment Plan (OT)  Daily Summary of Progress (OT): progress toward functional goals as expected  Barriers to Overall Progress (OT): pain  Plan for Continued Treatment (OT): cont IPOT per POC  Daily Summary of Progress (OT): progress toward functional goals as expected  Plan of Care Review  Plan of Care Reviewed With: patient  Plan of Care Reviewed With: patient  Outcome Summary: Pt sig decrease in pain with movements today tolerating rolling R and L in bed with Mod A; improved AROM in L UE w/o increase in pain and participation in ADL grooming tasks supine. Cont IPOT per POC        Outcome Measures     Row Name 05/07/18 1110 05/04/18 1447          How much help from another person do you currently need...    Turning from your back to your side  while in flat bed without using bedrails?  -- 2  -DM     Moving from lying on back to sitting on the side of a flat bed without bedrails?  -- 2  -DM     Moving to and from a bed to a chair (including a wheelchair)?  -- 1  -DM     Standing up from a chair using your arms (e.g., wheelchair, bedside chair)?  -- 1  -DM     Climbing 3-5 steps with a railing?  -- 1  -DM     To walk in hospital room?  -- 1  -DM     AM-PAC 6 Clicks Score  -- 8  -DM        How much help from another is currently needed...    Putting on and taking off regular lower body clothing? 2  -KF  --     Bathing (including washing, rinsing, and drying) 2  -KF  --     Toileting (which includes using toilet bed pan or urinal) 2  -KF  --     Putting on and taking off regular upper body clothing 2  -KF  --     Taking care of personal grooming (such as brushing teeth) 3  -KF  --     Eating meals 3  -KF  --     Score 14  -KF  --        Functional Assessment    Outcome Measure Options AM-PAC 6 Clicks Daily Activity (OT)  -KF AM-PAC 6 Clicks Basic Mobility (PT)  -DM       User Key  (r) = Recorded By, (t) = Taken By, (c) = Cosigned By    Initials Name Provider Type    IAN Cruz, PT Physical Therapist    FRANCIS Coles OT Occupational Therapist           Time Calculation:         Time Calculation- OT     Row Name 05/07/18 1423             Time Calculation- OT    OT Start Time 1110  -KF      Total Timed Code Minutes- OT 19 minute(s)  -KF      OT Received On 05/07/18  -KF      OT Goal Re-Cert Due Date 05/11/18  -KF        User Key  (r) = Recorded By, (t) = Taken By, (c) = Cosigned By    Initials Name Provider Type    FRANCIS Coles OT Occupational Therapist           Therapy Charges for Today     Code Description Service Date Service Provider Modifiers Qty    [id number]  OT THERAPEUTIC ACT EA 15 MIN 5/7/2018 Fatoumata Coles OT GO 1               Fatoumata Coles OT  5/7/2018

## 2018-05-08 NOTE — THERAPY TREATMENT NOTE
Acute Care - Physical Therapy Treatment Note  [facility]     Patient Name: Hector Mayfield Jr.  : 1931  MRN: [id number]  Today's Date: 2018  Onset of Illness/Injury or Date of Surgery: 18  Date of Referral to PT: 18  Referring Physician: ANGEL Hilton    Admit Date: 2018    Visit Dx:    ICD-10-CM ICD-9-CM   1. Dehydration E86.0 276.51   2. Elevated serum creatinine R79.89 790.99   3. Generalized weakness R53.1 780.79   4. Impaired mobility and activities of daily living Z74.09 799.89   5. Multiple contusions T07.XXXA 924.8   6. Impaired mobility and ADLs Z74.09 799.89   7. Impaired functional mobility, balance, gait, and endurance Z74.09 V49.89     Patient Active Problem List   Diagnosis   • HTN (hypertension)   • CHF (congestive heart failure)   • Elevated troponin   • Hyperglycemia   • Elevated serum creatinine   • Normocytic anemia   • Spinal stenosis   • Generalized weakness   • Dehydration   • Bilateral lower extremity edema   • PVC (premature ventricular contraction)       Therapy Treatment          Rehabilitation Treatment Summary     Row Name 18 1500 18 1120 18 0930       Treatment Time/Intention    Discipline physical therapist  -DHAVAL occupational therapist  -KF physical therapist  -    Document Type therapy note (daily note)  -DHAVAL therapy note (daily note)  -KF wound care;discharge treatment  -MW    Subjective Information complains of;weakness  -DHAVAL complains of;pain;weakness  -KF no complaints   pt and wife pleased with decrease in swelling   -    Mode of Treatment physical therapy  -DHAVAL occupational therapy  -KF individual therapy  -MW    Patient/Family Observations  -- sitting up in bed eating lunch; agreeable to eat lunch in chair  -KF sitting up in bed; compressogrips in place   -    Care Plan Review care plan/treatment goals reviewed;risks/benefits reviewed;patient/other agree to care plan  -DHAVAL patient/other agree to care plan;current/potential  barriers reviewed;care plan/treatment goals reviewed  -KF care plan/treatment goals reviewed;patient/other agree to care plan  -MW    Care Plan Review, Other Participant(s) spouse  -DHAVAL spouse  -KF spouse  -MW    Therapy Frequency (PT Clinical Impression) daily  -DHAVAL  --  --    Patient Effort good  -DHAVAL good  -KF  --    Comment  -- monitored HR and checked for signs of orthostatic;  -KF  --    Existing Precautions/Restrictions fall  -DHAVAL fall;other (see comments)   knee pain  -KF  --    Patient Response to Treatment  -- tolerated  -KF  --    Recorded by [DHAVAL] Adelaide Metzger, PT 05/08/18 1601 05/08/18 1601 [KF] Fatoumata Coles, OT 05/08/18 1306 05/08/18 1306 [MW] Guera Maxwell, PT 05/08/18 1023 05/08/18 1023    Row Name 05/08/18 1500 05/08/18 1120          Vital Signs    Pre Systolic BP Rehab 120  -DHAVAL 116   RN cleared vitals stable  -KF     Pre Treatment Diastolic BP 78  -DAHVAL 80  -KF     Post Systolic BP Rehab 118  -DHAVAL  --     Post Treatment Diastolic BP 77  -DHAVAL  --     Pretreatment Heart Rate (beats/min) 58  -DHAVAL 58  -KF     Posttreatment Heart Rate (beats/min) 58  -DHAVAL 52  -KF     Pre SpO2 (%) 96  -DHAVAL 96  -KF     O2 Delivery Pre Treatment room air  -DHAVAL room air  -KF     O2 Delivery Intra Treatment  -- room air  -KF     O2 Delivery Post Treatment  -- room air  -KF     Pre Patient Position Sitting  -DHAVAL Supine  -KF     Intra Patient Position Standing  -DHAVAL Standing  -KF     Post Patient Position Supine  -DHAVAL Sitting  -KF     Recorded by [DHAVAL] Adelaide Metzger, PT 05/08/18 1601 05/08/18 1601 [KF] Fatoumata Coles, OT 05/08/18 1306 05/08/18 1306     Row Name 05/08/18 1120             Cognitive Assessment/Intervention    Additional Documentation Cognitive Assessment/Intervention (Group)  -KF      Recorded by [KF] Fatoumata Coles, OT 05/08/18 1306 05/08/18 1306      Row Name 05/08/18 1500 05/08/18 1120 05/08/18 0930       Cognitive Assessment/Intervention- PT/OT    Affect/Mental Status (Cognitive) WFL  -DHAVAL anxious  -KF  WFL  -MW    Behavioral Issues (Cognitive)  -- overwhelmed easily  -KF  --    Orientation Status (Cognition) oriented x 4  -DHAVAL oriented x 4  -KF oriented x 4  -MW    Follows Commands (Cognition) WFL  -DHAVAL WFL  -KF  --    Cognitive Function (Cognitive) safety deficit  -DHAVAL attention deficit;safety deficit  -KF  --    Attention Deficit (Cognitive) mild deficit;distractible in noisy environment  -DHAVAL mild deficit;concentration;perseverates on recent task  -KF  --    Memory Deficit (Cognitive)  -- mild deficit  -KF  --    Safety Deficit (Cognitive)  -- moderate deficit;safety precautions awareness;insight into deficits/self awareness;awareness of need for assistance  -KF  --    Cognitive Interventions (Cognitive)  -- occupation/activity based interventions;process/task specific training  -KF  --    Personal Safety Interventions  -- fall prevention program maintained;gait belt;muscle strengthening facilitated;nonskid shoes/slippers when out of bed  -KF  --    Recorded by [DHAVAL] Adelaide Metzger, PT 05/08/18 1601 05/08/18 1601 [KF] Fatoumata Coles, OT 05/08/18 1306 05/08/18 1306 [MW] Guera Maxwell, PT 05/08/18 1023 05/08/18 1023    Row Name 05/08/18 1500 05/08/18 1120          Safety Issues, Functional Mobility    Safety Issues Affecting Function (Mobility) insight into deficits/self awareness;safety precaution awareness  -DHAVAL awareness of need for assistance;positioning of assistive device;insight into deficits/self awareness;safety precaution awareness  -KF     Impairments Affecting Function (Mobility) balance;endurance/activity tolerance  -DHAVAL balance;cognition;endurance/activity tolerance;postural/trunk control;pain;strength;range of motion (ROM)  -KF     Comment, Safety Issues/Impairments (Mobility)  -- bed to chair transfer; requires assist of 2  -KF     Recorded by [DHAVAL] Adelaide Metzger, PT 05/08/18 1601 05/08/18 1601 [KF] Fatoumata Coles, OT 05/08/18 1306 05/08/18 1306     Row Name 05/08/18 1500 05/08/18 1120           Bed Mobility Assessment/Treatment    Bed Mobility Assessment/Treatment  -- rolling right;scooting/bridging;supine-sit  -KF     Rolling Left [place] (Bed Mobility) minimum assist (75% patient effort)  -DHAVAL  --     Rolling Right [place] (Bed Mobility) minimum assist (75% patient effort)  -DHAVAL moderate assist (50% patient effort)  -KF     Scooting/Bridging [place] (Bed Mobility) moderate assist (50% patient effort)  -DHAVAL maximum assist (25% patient effort);2 person assist  -KF     Supine-Sit [place] (Bed Mobility)  -- maximum assist (25% patient effort);2 person assist  -KF     Sit-Supine [place] (Bed Mobility) maximum assist (25% patient effort);2 person assist  -DHAVAL  --     Bed Mobility, Safety Issues  -- decreased use of arms for pushing/pulling;decreased use of legs for bridging/pushing;impaired trunk control for bed mobility  -KF     Assistive Device (Bed Mobility)  -- bed rails;draw sheet;head of bed elevated  -KF     Comment (Bed Mobility) patient needs assist with legs and trunk to get into bed still having knee pain with bending  -DHAVAL  --     Recorded by [DHAVAL] Adelaide Metzger, PT 05/08/18 1601 05/08/18 1601 [KF] Fatoumata Coles, OT 05/08/18 1306 05/08/18 1306     Row Name 05/08/18 1120             Functional Mobility    Functional Mobility- Ind. Level 2 person assist required;verbal cues required;minimum assist (75% patient effort)  -KF      Functional Mobility- Device rolling walker  -KF2      Functional Mobility-Distance (Feet) 4  -KF2      Functional Mobility- Safety Issues loses balance backward;weight-shifting ability decreased;step length decreased;balance decreased during turns  -KF2      Recorded by [KF] Fatoumata Coles, OT 05/08/18 1311 05/08/18 1311  [KF2] Fatoumata Coles, OT 05/08/18 1306 05/08/18 1306      Row Name 05/08/18 1500 05/08/18 1120          Transfer Assessment/Treatment    Transfer Assessment/Treatment stand-sit transfer;sit-stand transfer;chair-bed  transfer  -DHAVAL sit-stand transfer;stand-sit transfer;bed-chair transfer  -KF     Bed-Chair [place] (Transfers)  -- 2 person assist;moderate assist (50% patient effort);verbal cues  -KF     Chair-Bed [place] (Transfers) moderate assist (50% patient effort);2 person assist  -DHAVAL  --     Assistive Device (Bed-Chair Transfers)  -- walker, front-wheeled  -KF     Sit-Stand [place] (Transfers) moderate assist (50% patient effort);2 person assist  -DHAVAL 2 person assist;maximum assist (25% patient effort)  -KF     Stand-Sit [place] (Transfers) moderate assist (50% patient effort);2 person assist   pt needs assist getting weight shifted forward  -DHAVAL maximum assist (25% patient effort);2 person assist;verbal cues  -KF     Recorded by [DHAVAL] Adelaide Metzger, PT 05/08/18 1601 05/08/18 1601 [KF] Fatoumata Coles, OT 05/08/18 1306 05/08/18 1306     Row Name 05/08/18 1500             Chair-Bed Transfer    Assistive Device (Chair-Bed Transfers) walker, front-wheeled  -DHAVAL      Recorded by [DHAVAL] Adelaide Metzger, PT 05/08/18 1601 05/08/18 1601      Row Name 05/08/18 1500 05/08/18 1120          Sit-Stand Transfer    Assistive Device (Sit-Stand Transfers) walker, front-wheeled  -DHAVAL walker, front-wheeled  -KF     Recorded by [DHAVAL] Adelaide Metzger, PT 05/08/18 1601 05/08/18 1601 [KF] Fatoumata Coles, OT 05/08/18 1306 05/08/18 1306     Row Name 05/08/18 1500 05/08/18 1120          Stand-Sit Transfer    Assistive Device (Stand-Sit Transfers) walker, front-wheeled  -DHAVAL walker, front-wheeled  -KF     Recorded by [DHAVAL] Adelaide Metzger, PT 05/08/18 1601 05/08/18 1601 [KF] Fatoumata Coles, OT 05/08/18 1306 05/08/18 1306     Row Name 05/08/18 1500             Gait/Stairs Assessment/Training    Gait/Stairs Assessment/Training gait/ambulation independence  -DHAVAL      [place] Level (Gait) verbal cues;minimum assist (75% patient effort);2 person assist  -DHAVAL      Assistive Device (Gait) walker, front-wheeled  -DHAVAL       Distance in Feet (Gait) 25  -DHAVAL      Pattern (Gait) step-to  -DHAVAL      Deviations/Abnormal Patterns (Gait) stride length decreased;bilateral deviations  -DHAVAL      Bilateral Gait Deviations forward flexed posture  -DHAVAL      Comment (Gait/Stairs) patient needs less assist with mobility today chair brought up behind patient for safety. good effort given when patient is encouiraged  -DHAVAL      Recorded by [DHAVAL] Adelaide Metzger, PT 05/08/18 1601 05/08/18 1601      Row Name 05/08/18 1120             ADL Assessment/Intervention    BADL Assessment/Intervention grooming;lower body dressing  -KF      Recorded by [KF] Fatoumata Coles, OT 05/08/18 1306 05/08/18 1306      Row Name 05/08/18 1120             Lower Body Dressing Assessment/Training    Lower Body Dressing [place] Level don;socks;dependent (less than 25% patient effort)  -KF      Lower Body Dressing Position supine  -KF      Recorded by [KF] Fatoumata Coles, OT 05/08/18 1306 05/08/18 1306      Row Name 05/08/18 1120             Grooming Assessment/Training    [place] Level (Grooming) hair care, combing/brushing;contact guard assist  -KF      Grooming Position edge of bed sitting  -KF      Recorded by [KF] Fatoumata Coles, OT 05/08/18 1306 05/08/18 1306      Row Name 05/08/18 1120             BADL Safety/Performance    Impairments, BADL Safety/Performance balance;cognition;endurance/activity tolerance;pain;strength;trunk/postural control  -KF      Cognitive Impairments, BADL Safety/Performance insight into deficits/self awareness;awareness, need for assistance;attention;safety precaution awareness  -KF      Skilled BADL Treatment/Intervention BADL process/adaptation training;cognitive/safety deficit modifications  -KF      Progress in BADL Status improvement noted  -KF      Recorded by [KF] Fatoumata Coles, OT 05/08/18 1306 05/08/18 1306      Row Name 05/08/18 1120             Motor Skills Assessment/Interventions    Additional Documentation Balance  (Group);Balance Interventions (Group)  -KF      Recorded by [KF] Fatoumata Coles, OT 05/08/18 1306 05/08/18 1306      Row Name 05/08/18 1500             Lower Extremity Seated Therapeutic Exercise    Performed, Seated Lower Extremity (Therapeutic Exercise) hip flexion/extension;knee flexion/extension;ankle dorsiflexion/plantarflexion   poor knee flexion due to pain  -DHAVAL      Exercise Type, Seated Lower Extremity (Therapeutic Exercise) AROM (active range of motion)  -DHAVAL      Sets/Reps Detail, Seated Lower Extremity (Therapeutic Exercise) 1/10  -DHAVAL      Recorded by [DHAVAL] Adelaide Metzger, PT 05/08/18 1601 05/08/18 1601      Row Name 05/08/18 1120             Balance    Balance static sitting balance;static standing balance  -KF      Recorded by [KF] Fatoumata Coles, OT 05/08/18 1306 05/08/18 1306      Row Name 05/08/18 1500 05/08/18 1120          Static Sitting Balance    Level of [place] (Unsupported Sitting, Static Balance) contact guard assist  -DHAVAL contact guard assist  -KF     Sitting Position (Unsupported Sitting, Static Balance) sitting on edge of bed  -DHAVAL sitting on edge of bed  -KF     Time Able to Maintain Position (Unsupported Sitting, Static Balance) 3 to 4 minutes  -DHAVAL 2 to 3 minutes  -KF     Comment (Unsupported Sitting, Static Balance)  -- demo anxious behaviors  -KF     Recorded by [DHAVAL] Adelaide Metzger, PT 05/08/18 1601 05/08/18 1601 [KF] Fatoumata Coles, OT 05/08/18 1306 05/08/18 1306     Row Name 05/08/18 1500             Dynamic Sitting Balance    Level of [place], Reaches Outside Midline (Sitting, Dynamic Balance) moderate assist, 50 to 74% patient effort  -DHAVAL      Sitting Position, Reaches Outside Midline (Sitting, Dynamic Balance) sitting on edge of bed  -DHAVAL      Recorded by [DHAVAL] Adelaide Metzger, PT 05/08/18 1601 05/08/18 1601      Row Name 05/08/18 1500 05/08/18 1120          Static Standing Balance    Level of [place] (Supported Standing, Static Balance) minimal assist,  75% patient effort  -DHAVAL moderate assist, 50 to 74% patient effort  -KF     Time Able to Maintain Position (Supported Standing, Static Balance) 2 to 3 minutes  -DHAVAL 1 to 2 minutes  -KF     Assistive Device Utilized (Supported Standing, Static Balance) rolling walker  -DHAVAL rolling walker  -KF     Recorded by [DHAVAL] Adelaide Metzger, PT 05/08/18 1601 05/08/18 1601 [KF] Fatoumata Coles, OT 05/08/18 1306 05/08/18 1306     Row Name 05/08/18 1120             Dynamic Balance Activity    Therapeutic Training Performed (Dynamic Balance) backward walking;other (see comments)   functional transfer to chair  -KF      Recorded by [KF] Fatoumata Coles, OT 05/08/18 1306 05/08/18 1306      Row Name 05/08/18 1500 05/08/18 1120 05/08/18 0930       Positioning and Restraints    Pre-Treatment Position sitting in chair/recliner  -DHAVAL in bed  -KF in bed  -MW    Post Treatment Position bed  -DHAVAL chair  -KF bed  -MW    In Bed notified nsg;supine;call light within reach;exit alarm on;with family/caregiver  -DHAVAL  -- call light within reach;with family/caregiver;with nsg  -MW    In Chair  -- notified nsg;reclined;sitting;call light within reach;encouraged to call for assist;exit alarm on;with family/caregiver;on mechanical lift sling;legs elevated;heels elevated  -KF  --    Recorded by [DHAVAL] Adelaide Metzger, PT 05/08/18 1601 05/08/18 1601 [KF] Fatoumata Coles, OT 05/08/18 1306 05/08/18 1306 [MW] Guera Maxwell, PT 05/08/18 1023 05/08/18 1023    Row Name 05/08/18 1500 05/08/18 1120          Pain Assessment    Additional Documentation Pain Scale: Numbers Pre/Post-Treatment (Group)  -DHAVAL Pain Scale: Numbers Pre/Post-Treatment (Group)  -KF     Recorded by [DHAVAL] Adelaide Metzger, PT 05/08/18 1601 05/08/18 1601 [KF] Fatoumata Coles, OT 05/08/18 1306 05/08/18 1306     Row Name 05/08/18 1500 05/08/18 1120          Pain Scale: Numbers Pre/Post-Treatment    Pain Scale: Numbers, Pretreatment 0/10 - no pain  -DHAVAL 0/10 - no pain  -KF     Pain Scale:  Numbers, Post-Treatment 4/10  -DHAVAL 0/10 - no pain  -KF     Pain Location - Side Bilateral  -DHAVAL Bilateral  -KF     Pain Location - Orientation  -- generalized  -KF     Pain Location knee  -DHAVAL knee  -KF     Pre/Post Treatment Pain Comment pain mostly when knees bend  -DHAVAL  --     Pain Intervention(s) Repositioned;Ambulation/increased activity  -DHAVAL Repositioned;Ambulation/increased activity  -KF     Recorded by [DHAVAL] Adelaide Metzger, PT 05/08/18 1601 05/08/18 1601 [KF] Fatoumata Coles, OT 05/08/18 1306 05/08/18 1306     Row Name 05/08/18 1120             Pain Scale: FACES Pre/Post-Treatment    Pain: FACES Scale, Pretreatment 2-->hurts little bit  -KF      Pain: FACES Scale, Post-Treatment 0-->no hurt  -KF      Pre/Post Treatment Pain Comment B knees during treatment and transfer  -KF      Recorded by [KF] Fatoumata Coles, OT 05/08/18 1306 05/08/18 1306      Row Name 05/08/18 1500             Coping    Observed Emotional State calm;cooperative  -DHAVAL      Verbalized Emotional State acceptance  -DHAVAL      Recorded by [DHAVAL] Adelaide Metzger, PT 05/08/18 1601 05/08/18 1601      Row Name 05/08/18 1500 05/08/18 1120 05/08/18 0930       Plan of Care Review    Plan of Care Reviewed With patient;spouse  -DHAVAL patient;spouse  -KF patient;spouse  -MW    Recorded by [DHAVAL] Adelaide Metzger, PT 05/08/18 1601 05/08/18 1601 [KF] Fatoumata Coles, OT 05/08/18 1306 05/08/18 1306 [MW] Guera Maxwell, PT 05/08/18 1023 05/08/18 1023    Row Name 05/08/18 1120             Outcome Summary/Treatment Plan (OT)    Daily Summary of Progress (OT) progress toward functional goals is good  -KF      Barriers to Overall Progress (OT) pain  -KF      Plan for Continued Treatment (OT) cont IPOT per POC  -KF      Recorded by [KF] Fatoumata Coles, OT 05/08/18 1306 05/08/18 1306      Row Name 05/08/18 1500             Outcome Summary/Treatment Plan (PT)    Daily Summary of Progress (PT) progress toward functional goals is good  -DHAVAL      Anticipated  Discharge Disposition (PT) inpatient rehab facility  -      Recorded by [DHAVAL] Adelaide CHEN Yassine, PT 05/08/18 1601 05/08/18 1601        User Key  (r) = Recorded By, (t) = Taken By, (c) = Cosigned By    Initials Name Effective Dates Discipline    DHAVAL Adelaide Metzger, PT 06/19/15 -  PT    MW Guera Maxwell, PT 03/07/18 -  PT    FRANCIS Coles, OT 04/03/18 -  OT                     Physical Therapy Education     Title: PT OT SLP Therapies (Active)     Topic: Physical Therapy (Active)     Point: Mobility training (Active)    Learning Progress Summary     Learner Status Readiness Method Response Comment Documented by    Patient Active Acceptance E NR  DHAVAL 05/08/18 1601     Active Eager E,D NR  DM 05/07/18 1439     Active Eager E,D NR  DM 05/04/18 1519     Active Acceptance E,D NR  DM 05/02/18 1249    Family Active Eager E,D NR  DM 05/07/18 1439     Active Eager E,D NR  DM 05/04/18 1519    Significant Other Active Acceptance E,D NR  DM 05/02/18 1249          Point: Home exercise program (Active)    Learning Progress Summary     Learner Status Readiness Method Response Comment Documented by    Patient Active Acceptance E NR  DHAVAL 05/08/18 1601     Active Eager E,D NR  DM 05/07/18 1439     Active Eager E,D NR  DM 05/04/18 1519     Active Acceptance E,D NR  DM 05/02/18 1249    Family Active Eager E,D NR  DM 05/07/18 1439     Active Eager E,D NR  DM 05/04/18 1519    Significant Other Active Acceptance E,D NR  DM 05/02/18 1249          Point: Body mechanics (Active)    Learning Progress Summary     Learner Status Readiness Method Response Comment Documented by    Patient Active Acceptance E NR  DHAVAL 05/08/18 1601     Active Eager E,D NR  DM 05/07/18 1439     Active Eager E,D NR  DM 05/04/18 1519     Active Acceptance E,D NR  DM 05/02/18 1249    Family Active Eager E,D NR  DM 05/07/18 1439     Active Eager E,D NR  DM 05/04/18 1519    Significant Other Active Acceptance E,D NR  DM 05/02/18 1249          Point: Precautions  (Active)    Learning Progress Summary     Learner Status Readiness Method Response Comment Documented by    Patient Active Acceptance E NR  DHAVAL 05/08/18 1601     Active Eager E,D NR  DM 05/07/18 1439     Active Eager E,D NR  DM 05/04/18 1519     Active Acceptance E,D NR  DM 05/02/18 1249    Family Active Eager E,D NR  DM 05/07/18 1439     Active Eager E,D NR  DM 05/04/18 1519    Significant Other Active Acceptance E,D NR  DM 05/02/18 1249                      User Key     Initials Effective Dates Name Provider Type Discipline     06/19/15 -  Adelaide Metzger, PT Physical Therapist PT     06/19/15 -  Bambi Cruz, PT Physical Therapist PT                    PT Recommendation and Plan  Anticipated Discharge Disposition (PT): inpatient rehab facility  Therapy Frequency (PT Clinical Impression): daily  Outcome Summary/Treatment Plan (PT)  Daily Summary of Progress (PT): progress toward functional goals is good  Anticipated Discharge Disposition (PT): inpatient rehab facility  Plan of Care Reviewed With: patient  Progress: improving  Outcome Summary: patient has most trouble getting up and down due to knee pain with flexion. once up patient is able to ambulate 25 ft with walker and min assist. He would benefit from continued therapy in IP rehab          Outcome Measures     Row Name 05/08/18 1500 05/08/18 1120 05/07/18 1307       How much help from another person do you currently need...    Turning from your back to your side while in flat bed without using bedrails? 3  -DHAVAL  -- 2  -DM    Moving from lying on back to sitting on the side of a flat bed without bedrails? 2  -DHAVAL  -- 2  -DM    Moving to and from a bed to a chair (including a wheelchair)? 3  -DHAVAL  -- 2  -DM    Standing up from a chair using your arms (e.g., wheelchair, bedside chair)? 2  -DHAVAL  -- 2  -DM    Climbing 3-5 steps with a railing? 1  -DHAVAL  -- 1  -DM    To walk in hospital room? 3  -DHAVAL  -- 3  -DM    AM-PAC 6 Clicks Score 14  -DHAVAL  -- 12  -DM        How much help from another is currently needed...    Putting on and taking off regular lower body clothing?  -- 2  -KF  --    Bathing (including washing, rinsing, and drying)  -- 2  -KF  --    Toileting (which includes using toilet bed pan or urinal)  -- 2  -KF  --    Putting on and taking off regular upper body clothing  -- 2  -KF  --    Taking care of personal grooming (such as brushing teeth)  -- 3  -KF  --    Eating meals  -- 3  -KF  --    Score  -- 14  -KF  --       Functional Assessment    Outcome Measure Options  -- AM-PAC 6 Clicks Daily Activity (OT)  -KF AM-PAC 6 Clicks Basic Mobility (PT)  -DM    Row Name 05/07/18 1110             How much help from another is currently needed...    Putting on and taking off regular lower body clothing? 2  -KF      Bathing (including washing, rinsing, and drying) 2  -KF      Toileting (which includes using toilet bed pan or urinal) 2  -KF      Putting on and taking off regular upper body clothing 2  -KF      Taking care of personal grooming (such as brushing teeth) 3  -KF      Eating meals 3  -KF      Score 14  -KF         Functional Assessment    Outcome Measure Options AM-PAC 6 Clicks Daily Activity (OT)  -KF        User Key  (r) = Recorded By, (t) = Taken By, (c) = Cosigned By    Initials Name Provider Type    DHAVAL Metzger, PT Physical Therapist    IAN Cruz, PT Physical Therapist    KF Fatoumata Coles, OT Occupational Therapist           Time Calculation:         PT Charges     Row Name 05/08/18 1603 05/08/18 1025          Time Calculation    Start Time 1500  -DHAVAL 0930  -MW     PT Received On 05/08/18  -DHAVAL  --     PT Goal Re-Cert Due Date 05/12/18  -DHAVAL  --        Time Calculation- PT    Total Timed Code Minutes- PT 25 minute(s)  -DHAVAL 5 minute(s)   no charge   -MW       User Key  (r) = Recorded By, (t) = Taken By, (c) = Cosigned By    Initials Name Provider Type    DHAVAL Metzger, PT Physical Therapist    WILLIAM Maxwell, PT Physical Therapist           Therapy Charges for Today     Code Description Service Date Service Provider Modifiers Qty    22161427622 HC PT THER PROC EA 15 MIN 5/8/2018 Adelaide Metzger, PT GP 2    88962132896 HC PT THER SUPP EA 15 MIN 5/8/2018 Adelaide Metzger, PT GP 2          PT G-Codes  Outcome Measure Options: AM-PAC 6 Clicks Daily Activity (OT)    Adelaide Metzger, PT  5/8/2018

## 2018-05-08 NOTE — PLAN OF CARE
Problem: Patient Care Overview  Goal: Plan of Care Review  Outcome: Ongoing (interventions implemented as appropriate)   05/08/18 1601   Coping/Psychosocial   Plan of Care Reviewed With patient   Plan of Care Review   Progress improving   OTHER   Outcome Summary patient has most trouble getting up and down due to knee pain with flexion. once up patient is able to ambulate 25 ft with walker and min assist. He would benefit from continued therapy in IP rehab

## 2018-05-08 NOTE — PLAN OF CARE
Problem: Patient Care Overview  Goal: Plan of Care Review  Outcome: Ongoing (interventions implemented as appropriate)   05/08/18 0655   Coping/Psychosocial   Plan of Care Reviewed With patient   Plan of Care Review   Progress no change   OTHER   Outcome Summary No acute distress.       Problem: Fluid Volume Deficit (Adult)  Goal: Optimal Fluid Balance  Outcome: Ongoing (interventions implemented as appropriate)   05/08/18 0655   Fluid Volume Deficit (Adult)   Optimal Fluid Balance making progress toward outcome       Problem: Skin Injury Risk (Adult)  Goal: Skin Health and Integrity  Outcome: Ongoing (interventions implemented as appropriate)   05/08/18 0655   Skin Injury Risk (Adult)   Skin Health and Integrity making progress toward outcome

## 2018-05-08 NOTE — PLAN OF CARE
Problem: Patient Care Overview  Goal: Plan of Care Review   05/08/18 1502   Coping/Psychosocial   Plan of Care Reviewed With patient   Plan of Care Review   Progress improving   OTHER   Outcome Summary Pt. has been able to ambulate and transfer to the [facility] with the assist of 2 and a walker. No new complaints. Awaiting d/c to rehab. Will continue to monitor

## 2018-05-08 NOTE — PROGRESS NOTES
[facility] Medicine Services  PROGRESS NOTE    Patient Name: Hector Mayfield Jr.  : 1931  MRN: [id number]    Date of Admission: 2018  Length of Stay: 7  Primary Care Physician: Terrence Salazar MD    Subjective   Subjective   CC:  F/u generalized weakness     HPI:  Patient sitting up in chair in NAD.  Patient states he feels good and is ready to go to rehabilitation.  Positive BM's.  Wife is in the room. No adverse events overnight.  Wife is concerned about patient's placement, because she has specifically states where she would like him to go for rehabilitation.  Working with CM.    Review of Systems  Gen- No fevers, chills  CV- No chest pain, palpitations  Resp- No cough, dyspnea  GI-  No N/V/D, abd pain  - urine retention, danielson placed  Otherwise ROS is negative except as mentioned in the HPI.    Objective   Objective   Vital Signs:   Temp:  [97.7 °F (36.5 °C)-98 °F (36.7 °C)] 98 °F (36.7 °C)  Heart Rate:  [45-54] 46  Resp:  [16-19] 16  BP: (116-126)/(60-80) 116/80  Physical Exam:  General: Alert, well-developed thin frail ill-appearing male in no acute distress    Head: Normocephalic atraumatic    Eyes: PERRLA, EOMI, nonicteric, conjunctiva normal    ENT: Pink, moist mucous membranes    Neck: Supple, nontender, trachea midline without lymphadenopathy, JVD, nuchal rigidity.      Cardiovascular: RRR  no R/G + murmur  +1DP pulses bilaterally    Respiratory: Nonlabored, symmetrical chest expansion, clear to auscultation bilaterally but diminished in bases    Abdomen: Soft, nontender, nondistended,  positive bowel sounds in all 4 quadrants     Extremities: FROM in upper and lower extremities bilaterally. Trace May edema/cyanosis.  Negative calf pain    Skin: Pink/warm/dry.  No rash or lesions noted    Neuro: Oriented to person place time and situation, speech is clear, follows all commands, recent and remote memory intact    Psych: Patient is pleasant and cooperative.   Normal affect.  Negative suicidal ideation or homicidal ideation.    Results Reviewed:  I have personally reviewed current lab, radiology, and data and agree.    Results from last 7 days  Lab Units 05/03/18  0517 05/02/18  0528   WBC 10*3/mm3 7.71 6.59   HEMOGLOBIN g/dL 9.1* 8.8*   HEMATOCRIT % 30.1* 28.5*   PLATELETS 10*3/mm3 167 149*       Results from last 7 days  Lab Units 05/05/18  0502 05/04/18  0510 05/03/18  0517 05/02/18  0528   SODIUM mmol/L 142 142 142 145   POTASSIUM mmol/L 4.0 3.8 4.0 4.0   CHLORIDE mmol/L 111* 112* 110* 112*   CO2 mmol/L 26.0 25.0 27.0 26.0   BUN mg/dL 20 22 19 22   CREATININE mg/dL 1.00 1.10 1.10 1.30   GLUCOSE mg/dL 101* 102* 93 87   CALCIUM mg/dL 8.3* 8.6* 8.7 8.6*   ALT (SGPT) U/L  --   --  22 23   AST (SGOT) U/L  --   --  19 31     Estimated Creatinine Clearance: 52.4 mL/min (by C-G formula based on SCr of 1 mg/dL).      Results for orders placed during the hospital encounter of 04/30/18   Adult Transthoracic Echo Complete W/ Cont if Necessary Per Protocol    Narrative · Mild tricuspid valve regurgitation is present.  · Calculated right ventricular systolic pressure from tricuspid   regurgitation is 30 mmHg.        I have reviewed the medications.    Assessment/Plan   Assessment / Plan     Hospital Problem List     * (Principal)Generalized weakness    HTN (hypertension)    CHF (congestive heart failure)    Elevated troponin    Hyperglycemia    Elevated serum creatinine    Normocytic anemia    Spinal stenosis    Dehydration    Bilateral lower extremity edema    PVC (premature ventricular contraction)        Brief Hospital Course to date:  Hector Ngsigrid Cobos. is a 86 y.o. male with a past medical history of chronic heart failure, BPH, hypertension, chronic constipation, and non-Hodgkin's lymphoma who presented to the [facility] emergency department late on 4/30 with progressive generalized weakness for the past month associated with recurrent falls without significant  trauma, loss of consciousness, or injury.  Patient also reported increasing peripheral edema and evaluation the ER disclosed mildly elevated BNP.  Patient was admitted for further evaluation and supportive care.    Assessment & Plan:  Progressive Generalized weakness with frequent falls  Soft Tissue injuries due to fall with left-sided pain  -No evidence of acute bony injuries on imaging   - 5/6/18 added voltaren gel and will give 5 days burst dose 20mg prednisone to help with inflammatory pain  --Awaiting final PT recs for placement    5/4/18 episode of Afib with RVR  - converted back to NSR with IV digoxin. BP normalized.  - Cards eval'd, added metoprolol (CHADsVASc = 2, no anticoagulation recommended).    - at d/c plan to f/u with Dr. Noe in 3 months    Chronic LV Diastolic heart failure  - Echo is normal, does not appear to be decompensated, suspect his peripheral edema is due to venous insufficiency/poor functional state/[poor nutrition   - His FeNA was suggestive of intravascular dehydration     Elevated troponin  -No evidence of ACS, likely related to CRISTO, Echo normal  - Continue aspirin, no direct indication for statin (FLP reviewed)    Nonoliguric Acute kidney injury, resolved  - Likely prerenal azotemia, s/p IVF    Urinary retention, likely due to immobility and additive constipation  -started on flomax  -had been requiring intermittent I/O cath, danielson now placed -->  Can trial bladder training at rehab once more mobile  -start on aggressive Bowel regimen   -[facility]    Constipation, resolved  -started on bowel rx, on 5/6/8 increased regimen and given mag citrate and suppository     Bilateral lower extremity edema, Asymmetric  - Duplex negative for DVT, Echo normal   - No proteinuria , No evidence of hepatic dysfunction   - Suspect venous stasis due to venous insufficiency and poor functional state  - PT wound care consulted for leg wraps that were removed  today    Normocytic anemia of chronic disease  - Hgb stable   --AM labs    Hypokalemia  --Resolved   --Am labs    Hypertension  - BP stable    DVT Prophylaxis:  SQ heparin  CODE STATUS: Full Code    Disposition: I expect the patient to be discharged to short term rehab when bed available.  Patient's wife wants him to go to the [facility] [place] or [facility] for rehabilitation.  Patient's wife states that she didn't know where [facility] came from, because she never requested [facility] and does not want to go there.  Requests further facilities were placed yesterday by  and followed up by another  today.  Other facilities 1 the PT notes to reflect how patient is doing today before they will agree to take him or not, so PT is pending.    More than 50% of time spent counseling on current illness and plan of care. Case discussed with: Patient, patient's wife,  patient,   Total time spent face to face with the patient was 20 minutes.  Total time of the encounter was 35 minutes.    Electronically signed by ULYSSES Arzate, 05/08/18, 1:02 PM.

## 2018-05-08 NOTE — THERAPY DISCHARGE NOTE
Acute Care - Physical Therapy Lymphedema Treatment Note/Discharge  [facility]     Patient Name: Hector Mayfield Jr.  : 1931  MRN: [id number]  Today's Date: 2018  Onset of Illness/Injury or Date of Surgery: 18   Date of Referral to PT: 18   Referring Physician: ANGEL Hilton        Admit Date: 2018    Visit Dx:    ICD-10-CM ICD-9-CM   1. Dehydration E86.0 276.51   2. Elevated serum creatinine R79.89 790.99   3. Generalized weakness R53.1 780.79   4. Impaired mobility and activities of daily living Z74.09 799.89   5. Multiple contusions T07.XXXA 924.8   6. Impaired mobility and ADLs Z74.09 799.89   7. Impaired functional mobility, balance, gait, and endurance Z74.09 V49.89       Patient Active Problem List   Diagnosis   • HTN (hypertension)   • CHF (congestive heart failure)   • Elevated troponin   • Hyperglycemia   • Elevated serum creatinine   • Normocytic anemia   • Spinal stenosis   • Generalized weakness   • Dehydration   • Bilateral lower extremity edema   • PVC (premature ventricular contraction)              Lymphedema     Row Name 18 0930 18 0815          Lymphedema Edema Assessment    Ptting Edema Category By severity  -MW  --     Pitting Edema Mild  -MW  --     Edema Assessment Comment RLE none; LLE mild to trace at ankle   -MW  --     Recorded by [MW] Guera Maxwell PT             Skin Changes/Observations    Location/Assessment Lower Extremity  -MW  --     Lower Extremity Conditions bilateral:;intact;clean;dry  -MW  --     Skin Observations Comment PCT giving bath; to bathe BLE   -MW  --     Recorded by [MW] Guera Maxwell PT             Compression/Skin Care    Wrapping Comments wraps d/c'd   -MW BLE wraps intact with no issues noted, B heels intact and blanchable.   -MF     Recorded by [MW] Guera Maxwell PT [MF] Ricky White PT       User Key  (r) = Recorded By, (t) = Taken By, (c) = Cosigned By    Initials Name Effective Dates    REMY AGUIRRE  Christopher, PT 06/19/15 -     MW Guera Maxwell, PT 03/07/18 -           Therapy Treatment        Rehabilitation Treatment Summary     Row Name 05/08/18 0930             Treatment Time/Intention    Discipline physical therapist  -MW      Document Type wound care;discharge treatment  -MW      Subjective Information no complaints   pt and wife pleased with decrease in swelling   -MW      Mode of Treatment individual therapy  -MW      Patient/Family Observations sitting up in bed; compressogrips in place   -MW      Care Plan Review care plan/treatment goals reviewed;patient/other agree to care plan  -MW      Care Plan Review, Other Participant(s) spouse  -MW      Recorded by [MW] Guera Maxwell, PT 05/08/18 1023 05/08/18 1023      Row Name 05/08/18 0930             Cognitive Assessment/Intervention- PT/OT    Affect/Mental Status (Cognitive) WFL  -MW      Orientation Status (Cognition) oriented x 4  -MW      Recorded by [MW] Guera Maxwell, PT 05/08/18 1023 05/08/18 1023      Row Name 05/08/18 0930             Positioning and Restraints    Pre-Treatment Position in bed  -MW      Post Treatment Position bed  -MW      In Bed call light within reach;with family/caregiver;with nsg  -MW      Recorded by [MW] Guera Maxwell, PT 05/08/18 1023 05/08/18 1023      Row Name 05/08/18 0930             Plan of Care Review    Plan of Care Reviewed With patient;spouse  -MW      Recorded by [MW] Guera Maxwell, PT 05/08/18 1023 05/08/18 1023        User Key  (r) = Recorded By, (t) = Taken By, (c) = Cosigned By    Initials Name Effective Dates Discipline    MW Guera Maxwell, PT 03/07/18 -  PT                   PT Recommendation and Plan       Plan of Care Reviewed With: patient, spouse  Progress: improving  Outcome Summary: BLE edema much improved; RLE without swelling, Lt ankle with min to trace edema. D/c compression wraps; switch to TEDs prn. D/c PT wound care / leg wraps.             Outcome Measures     Row Name 05/07/18  1307 05/07/18 1110          How much help from another person do you currently need...    Turning from your back to your side while in flat bed without using bedrails? 2  -DM  --     Moving from lying on back to sitting on the side of a flat bed without bedrails? 2  -DM  --     Moving to and from a bed to a chair (including a wheelchair)? 2  -DM  --     Standing up from a chair using your arms (e.g., wheelchair, bedside chair)? 2  -DM  --     Climbing 3-5 steps with a railing? 1  -DM  --     To walk in hospital room? 3  -DM  --     AM-PAC 6 Clicks Score 12  -DM  --        How much help from another is currently needed...    Putting on and taking off regular lower body clothing?  -- 2  -KF     Bathing (including washing, rinsing, and drying)  -- 2  -KF     Toileting (which includes using toilet bed pan or urinal)  -- 2  -KF     Putting on and taking off regular upper body clothing  -- 2  -KF     Taking care of personal grooming (such as brushing teeth)  -- 3  -KF     Eating meals  -- 3  -KF     Score  -- 14  -KF        Functional Assessment    Outcome Measure Options AM-PAC 6 Clicks Basic Mobility (PT)  -DM AM-PAC 6 Clicks Daily Activity (OT)  -KF       User Key  (r) = Recorded By, (t) = Taken By, (c) = Cosigned By    Initials Name Provider Type    DM Bambi Cruz, PT Physical Therapist    KF Fatoumata Coles, OT Occupational Therapist            Time Calculation        PT Charges     Row Name 05/08/18 1025             Time Calculation    Start Time 0930  -MW         Time Calculation- PT    Total Timed Code Minutes- PT 5 minute(s)   no charge   -MW        User Key  (r) = Recorded By, (t) = Taken By, (c) = Cosigned By    Initials Name Provider Type    MW Guera Maxwell, PT Physical Therapist                  PT G-Codes  Outcome Measure Options: AM-PAC 6 Clicks Basic Mobility (PT)               Guera Maxwell, PT  5/8/2018

## 2018-05-08 NOTE — PLAN OF CARE
Problem: Patient Care Overview  Goal: Plan of Care Review  Outcome: Ongoing (interventions implemented as appropriate)   05/08/18 3827   Coping/Psychosocial   Plan of Care Reviewed With patient   Plan of Care Review   Progress improving   OTHER   Outcome Summary Pt completed functional mob 4 ft min Ax2 with max VC's for seq and HP with RW completing bed to chair transfer. Pt max Ax2 sit<>stand transfer. OT provided postural cues to improve trunk control and ed on body mechanics for safety with RW. Cont IPOT per POC

## 2018-05-08 NOTE — THERAPY TREATMENT NOTE
Acute Care - Occupational Therapy Treatment Note  [facility]     Patient Name: Hector Mayfield Jr.  : 1931  MRN: [id number]  Today's Date: 2018  Onset of Illness/Injury or Date of Surgery: 18  Date of Referral to OT: 18  Referring Physician: ANGEL Hilton    Admit Date: 2018       ICD-10-CM ICD-9-CM   1. Dehydration E86.0 276.51   2. Elevated serum creatinine R79.89 790.99   3. Generalized weakness R53.1 780.79   4. Impaired mobility and activities of daily living Z74.09 799.89   5. Multiple contusions T07.XXXA 924.8   6. Impaired mobility and ADLs Z74.09 799.89   7. Impaired functional mobility, balance, gait, and endurance Z74.09 V49.89     Patient Active Problem List   Diagnosis   • HTN (hypertension)   • CHF (congestive heart failure)   • Elevated troponin   • Hyperglycemia   • Elevated serum creatinine   • Normocytic anemia   • Spinal stenosis   • Generalized weakness   • Dehydration   • Bilateral lower extremity edema   • PVC (premature ventricular contraction)     Past Medical History:   Diagnosis Date   • Congestive heart failure (CHF)    • Hypertension    • Non Hodgkin's lymphoma      Past Surgical History:   Procedure Laterality Date   • HERNIA REPAIR     • PROSTATE SURGERY         Therapy Treatment          Rehabilitation Treatment Summary     Row Name 18 1120 18 0930          Treatment Time/Intention    Discipline occupational therapist  -KF physical therapist  -MW     Document Type therapy note (daily note)  -KF wound care;discharge treatment  -MW     Subjective Information complains of;pain;weakness  -KF no complaints   pt and wife pleased with decrease in swelling   -MW     Mode of Treatment occupational therapy  -KF individual therapy  -MW     Patient/Family Observations sitting up in bed eating lunch; agreeable to eat lunch in chair  -KF sitting up in bed; compressogrips in place   -MW     Care Plan Review patient/other agree to care plan;current/potential  barriers reviewed;care plan/treatment goals reviewed  -KF care plan/treatment goals reviewed;patient/other agree to care plan  -MW     Care Plan Review, Other Participant(s) spouse  -KF spouse  -MW     Patient Effort good  -KF  --     Comment monitored HR and checked for signs of orthostatic;  -KF  --     Existing Precautions/Restrictions fall;other (see comments)   knee pain  -KF  --     Patient Response to Treatment tolerated  -KF  --     Recorded by [KF] Fatoumata Coles, OT 05/08/18 1306 05/08/18 1306 [MW] Guera Maxwell, PT 05/08/18 1023 05/08/18 1023     Row Name 05/08/18 1120             Vital Signs    Pre Systolic BP Rehab 116   RN cleared vitals stable  -KF      Pre Treatment Diastolic BP 80  -KF      Pretreatment Heart Rate (beats/min) 58  -KF      Posttreatment Heart Rate (beats/min) 52  -KF      Pre SpO2 (%) 96  -KF      O2 Delivery Pre Treatment room air  -KF      O2 Delivery Intra Treatment room air  -KF      O2 Delivery Post Treatment room air  -KF      Pre Patient Position Supine  -KF      Intra Patient Position Standing  -KF      Post Patient Position Sitting  -KF      Recorded by [KF] Fatoumata Coles, OT 05/08/18 1306 05/08/18 1306      Row Name 05/08/18 1120             Cognitive Assessment/Intervention    Additional Documentation Cognitive Assessment/Intervention (Group)  -KF      Recorded by [KF] Fatoumata Coles, OT 05/08/18 1306 05/08/18 1306      Row Name 05/08/18 1120 05/08/18 0930          Cognitive Assessment/Intervention- PT/OT    Affect/Mental Status (Cognitive) anxious  -KF WFL  -MW     Behavioral Issues (Cognitive) overwhelmed easily  -KF  --     Orientation Status (Cognition) oriented x 4  -KF oriented x 4  -MW     Follows Commands (Cognition) WFL  -KF  --     Cognitive Function (Cognitive) attention deficit;safety deficit  -KF  --     Attention Deficit (Cognitive) mild deficit;concentration;perseverates on recent task  -KF  --     Memory Deficit (Cognitive) mild deficit  -KF   --     Safety Deficit (Cognitive) moderate deficit;safety precautions awareness;insight into deficits/self awareness;awareness of need for assistance  -KF  --     Cognitive Interventions (Cognitive) occupation/activity based interventions;process/task specific training  -KF  --     Personal Safety Interventions fall prevention program maintained;gait belt;muscle strengthening facilitated;nonskid shoes/slippers when out of bed  -KF  --     Recorded by [KF] Fatoumata Coles, OT 05/08/18 1306 05/08/18 1306 [MW] Guera Maxwell, PT 05/08/18 1023 05/08/18 1023     Row Name 05/08/18 1120             Safety Issues, Functional Mobility    Safety Issues Affecting Function (Mobility) awareness of need for assistance;positioning of assistive device;insight into deficits/self awareness;safety precaution awareness  -KF      Impairments Affecting Function (Mobility) balance;cognition;endurance/activity tolerance;postural/trunk control;pain;strength;range of motion (ROM)  -KF      Comment, Safety Issues/Impairments (Mobility) bed to chair transfer; requires assist of 2  -KF      Recorded by [KF] Fatoumata Coles, OT 05/08/18 1306 05/08/18 1306      Row Name 05/08/18 1120             Bed Mobility Assessment/Treatment    Bed Mobility Assessment/Treatment rolling right;scooting/bridging;supine-sit  -KF      Rolling Right [place] (Bed Mobility) moderate assist (50% patient effort)  -KF      Scooting/Bridging [place] (Bed Mobility) maximum assist (25% patient effort);2 person assist  -KF      Supine-Sit [place] (Bed Mobility) maximum assist (25% patient effort);2 person assist  -KF      Bed Mobility, Safety Issues decreased use of arms for pushing/pulling;decreased use of legs for bridging/pushing;impaired trunk control for bed mobility  -KF      Assistive Device (Bed Mobility) bed rails;draw sheet;head of bed elevated  -KF      Recorded by [KF] Fatoumata Coles, OT 05/08/18 1306 05/08/18 1306      Row Name 05/08/18  1120             Functional Mobility    Functional Mobility- Ind. Level 2 person assist required;verbal cues required;minimum assist (75% patient effort)  -KF      Functional Mobility- Device rolling walker  -KF2      Functional Mobility-Distance (Feet) 4  -KF2      Functional Mobility- Safety Issues loses balance backward;weight-shifting ability decreased;step length decreased;balance decreased during turns  -KF2      Recorded by [KF] Fatoumata Coles, OT 05/08/18 1311 05/08/18 1311  [KF2] Fatoumata Coles, OT 05/08/18 1306 05/08/18 1306      Row Name 05/08/18 1120             Transfer Assessment/Treatment    Transfer Assessment/Treatment sit-stand transfer;stand-sit transfer;bed-chair transfer  -KF      Bed-Chair [place] (Transfers) 2 person assist;moderate assist (50% patient effort);verbal cues  -KF      Assistive Device (Bed-Chair Transfers) walker, front-wheeled  -KF      Sit-Stand [place] (Transfers) 2 person assist;maximum assist (25% patient effort)  -KF      Stand-Sit [place] (Transfers) maximum assist (25% patient effort);2 person assist;verbal cues  -KF      Recorded by [KF] Fatoumata Coles, OT 05/08/18 1306 05/08/18 1306      Row Name 05/08/18 1120             Sit-Stand Transfer    Assistive Device (Sit-Stand Transfers) walker, front-wheeled  -KF      Recorded by [KF] Fatoumata Coles, OT 05/08/18 1306 05/08/18 1306      Row Name 05/08/18 1120             Stand-Sit Transfer    Assistive Device (Stand-Sit Transfers) walker, front-wheeled  -KF      Recorded by [KF] Fatoumata Coles, OT 05/08/18 1306 05/08/18 1306      Row Name 05/08/18 1120             ADL Assessment/Intervention    BADL Assessment/Intervention grooming;lower body dressing  -KF      Recorded by [KF] Fatoumata Coles, OT 05/08/18 1306 05/08/18 1306      Row Name 05/08/18 1120             Lower Body Dressing Assessment/Training    Lower Body Dressing [place] Level don;socks;dependent (less than 25% patient effort)   -KF      Lower Body Dressing Position supine  -KF      Recorded by [KF] Fatoumata Coles, OT 05/08/18 1306 05/08/18 1306      Row Name 05/08/18 1120             Grooming Assessment/Training    [place] Level (Grooming) hair care, combing/brushing;contact guard assist  -KF      Grooming Position edge of bed sitting  -KF      Recorded by [KF] Fatoumata Coles, OT 05/08/18 1306 05/08/18 1306      Row Name 05/08/18 1120             BADL Safety/Performance    Impairments, BADL Safety/Performance balance;cognition;endurance/activity tolerance;pain;strength;trunk/postural control  -KF      Cognitive Impairments, BADL Safety/Performance insight into deficits/self awareness;awareness, need for assistance;attention;safety precaution awareness  -KF      Skilled BADL Treatment/Intervention BADL process/adaptation training;cognitive/safety deficit modifications  -KF      Progress in BADL Status improvement noted  -KF      Recorded by [KF] Fatoumata Coles, OT 05/08/18 1306 05/08/18 1306      Row Name 05/08/18 1120             Motor Skills Assessment/Interventions    Additional Documentation Balance (Group);Balance Interventions (Group)  -KF      Recorded by [KF] Fatoumata Coles, OT 05/08/18 1306 05/08/18 1306      Row Name 05/08/18 1120             Balance    Balance static sitting balance;static standing balance  -KF      Recorded by [KF] Fatoumata Coles, OT 05/08/18 1306 05/08/18 1306      Row Name 05/08/18 1120             Static Sitting Balance    Level of [place] (Unsupported Sitting, Static Balance) contact guard assist  -KF      Sitting Position (Unsupported Sitting, Static Balance) sitting on edge of bed  -KF      Time Able to Maintain Position (Unsupported Sitting, Static Balance) 2 to 3 minutes  -KF      Comment (Unsupported Sitting, Static Balance) demo anxious behaviors  -KF      Recorded by [KF] Fatoumata Coles, OT 05/08/18 1306 05/08/18 1306      Row Name 05/08/18 1120             Static Standing  Balance    Level of [place] (Supported Standing, Static Balance) moderate assist, 50 to 74% patient effort  -KF      Time Able to Maintain Position (Supported Standing, Static Balance) 1 to 2 minutes  -KF      Assistive Device Utilized (Supported Standing, Static Balance) rolling walker  -KF      Recorded by [KF] Fatoumata Coles, OT 05/08/18 1306 05/08/18 1306      Row Name 05/08/18 1120             Dynamic Balance Activity    Therapeutic Training Performed (Dynamic Balance) backward walking;other (see comments)   functional transfer to chair  -KF      Recorded by [KF] Fatoumata Coles, OT 05/08/18 1306 05/08/18 1306      Row Name 05/08/18 1120 05/08/18 0930          Positioning and Restraints    Pre-Treatment Position in bed  -KF in bed  -MW     Post Treatment Position chair  -KF bed  -MW     In Bed  -- call light within reach;with family/caregiver;with nsg  -MW     In Chair notified nsg;reclined;sitting;call light within reach;encouraged to call for assist;exit alarm on;with family/caregiver;on mechanical lift sling;legs elevated;heels elevated  -KF  --     Recorded by [KF] Fatoumata Coles, OT 05/08/18 1306 05/08/18 1306 [MW] Guera Maxwell, PT 05/08/18 1023 05/08/18 1023     Row Name 05/08/18 1120             Pain Assessment    Additional Documentation Pain Scale: Numbers Pre/Post-Treatment (Group)  -KF      Recorded by [KF] Fatoumata Coles, OT 05/08/18 1306 05/08/18 1306      Row Name 05/08/18 1120             Pain Scale: Numbers Pre/Post-Treatment    Pain Scale: Numbers, Pretreatment 0/10 - no pain  -KF      Pain Scale: Numbers, Post-Treatment 0/10 - no pain  -KF      Pain Location - Side Bilateral  -KF      Pain Location - Orientation generalized  -KF      Pain Location knee  -KF      Pain Intervention(s) Repositioned;Ambulation/increased activity  -KF      Recorded by [KF] Fatoumata Coles, OT 05/08/18 1306 05/08/18 1306      Row Name 05/08/18 1120             Pain Scale: FACES  Pre/Post-Treatment    Pain: FACES Scale, Pretreatment 2-->hurts little bit  -KF      Pain: FACES Scale, Post-Treatment 0-->no hurt  -KF      Pre/Post Treatment Pain Comment B knees during treatment and transfer  -KF      Recorded by [KF] Fatoumata Coles, OT 05/08/18 1306 05/08/18 1306      Row Name 05/08/18 1120 05/08/18 0930          Plan of Care Review    Plan of Care Reviewed With patient;spouse  -KF patient;spouse  -MW     Recorded by [KF] Fatoumata Coles, OT 05/08/18 1306 05/08/18 1306 [MW] Guera Maxwell, PT 05/08/18 1023 05/08/18 1023     Row Name 05/08/18 1120             Outcome Summary/Treatment Plan (OT)    Daily Summary of Progress (OT) progress toward functional goals is good  -KF      Barriers to Overall Progress (OT) pain  -KF      Plan for Continued Treatment (OT) cont IPOT per POC  -KF      Recorded by [KF] Fatoumata Coles, OT 05/08/18 1306 05/08/18 1306        User Key  (r) = Recorded By, (t) = Taken By, (c) = Cosigned By    Initials Name Effective Dates Discipline    MW Guera Maxwell, PT 03/07/18 -  PT    FRANCIS Coles, OT 04/03/18 -  OT               Occupational Therapy Education     Title: PT OT SLP Therapies (Active)     Topic: Occupational Therapy (Active)     Point: ADL training (Active)     Description: Instruct learner(s) on proper safety adaptation and remediation techniques during self care or transfers.   Instruct in proper use of assistive devices.   Learning Progress Summary     Learner Status Readiness Method Response Comment Documented by    Patient Done Acceptance E VU Pt educated on HEP for BUE, body mechanics for bed mobility, and purpose of OT services today KF 05/07/18 1423     Active Acceptance E,D NR  DM 05/02/18 1249     Done Acceptance E,D VU,NR Education intitiated for benefits of OOB activity/therapy, role of OT, ROM HEP to support ADLs, and recommendation for IRF at d/c TB 05/01/18 1524    Family Done Acceptance E VU Pt educated on HEP for BUE, body  mechanics for bed mobility, and purpose of OT services today  05/07/18 1423     Done Acceptance E,D VU,NR Education intitiated for benefits of OOB activity/therapy, role of OT, ROM HEP to support ADLs, and recommendation for IRF at d/c  05/01/18 1524    Significant Other Active Acceptance E,D NR  DM 05/02/18 1249          Point: Home exercise program (Active)     Description: Instruct learner(s) on appropriate technique for monitoring, assisting and/or progressing therapeutic exercises/activities.   Learning Progress Summary     Learner Status Readiness Method Response Comment Documented by    Patient Done Acceptance E VU Pt educated on HEP for BUE, body mechanics for bed mobility, and purpose of OT services today  05/07/18 1423     Active Acceptance E NR Pt educated on HEP for AAROM and AAROM BUE to improve activity tolerance for ADLs while in supine  05/02/18 1439     Active Acceptance E,D NR  DM 05/02/18 1249     Done Acceptance E,D VU,NR Education intitiated for benefits of OOB activity/therapy, role of OT, ROM HEP to support ADLs, and recommendation for IRF at d/c  05/01/18 1524    Family Done Acceptance E VU Pt educated on HEP for BUE, body mechanics for bed mobility, and purpose of OT services today  05/07/18 1423     Active Acceptance E NR Pt educated on HEP for AAROM and AAROM BUE to improve activity tolerance for ADLs while in supine  05/02/18 1439     Done Acceptance E,D VU,NR Education intitiated for benefits of OOB activity/therapy, role of OT, ROM HEP to support ADLs, and recommendation for IRF at d/c  05/01/18 1524    Significant Other Active Acceptance E,D NR  DM 05/02/18 1249          Point: Precautions (Done)     Description: Instruct learner(s) on prescribed precautions during self-care and functional transfers.   Learning Progress Summary     Learner Status Readiness Method Response Comment Documented by    Patient Done Acceptance E VU,NR Pt educated on body mechanics and safety  awareness with functional transfer using RW and purpose of OT services to improve functional mobility.  05/08/18 1312          Point: Body mechanics (Done)     Description: Instruct learner(s) on proper positioning and spine alignment during self-care, functional mobility activities and/or exercises.   Learning Progress Summary     Learner Status Readiness Method Response Comment Documented by    Patient Done Acceptance E VU,NR Pt educated on body mechanics and safety awareness with functional transfer using RW and purpose of OT services to improve functional mobility.  05/08/18 1312                      User Key     Initials Effective Dates Name Provider Type Discipline    TB 06/22/15 -  Francisca Lopez, OT Occupational Therapist OT    DM 06/19/15 -  Bambi Cruz, PT Physical Therapist PT    KF 04/03/18 -  Fatoumata Coles, OT Occupational Therapist OT                OT Recommendation and Plan  Outcome Summary/Treatment Plan (OT)  Daily Summary of Progress (OT): progress toward functional goals is good  Barriers to Overall Progress (OT): pain  Plan for Continued Treatment (OT): cont IPOT per POC  Daily Summary of Progress (OT): progress toward functional goals is good  Plan of Care Review  Plan of Care Reviewed With: patient  Plan of Care Reviewed With: patient  Outcome Summary: Pt completed functional mob 4 ft mod Ax2 with RW completing bed to chair transfer. OT provided postural cues to improve trunk control and ed on body mechanics for safety with RW. Cont IPOT per POC        Outcome Measures     Row Name 05/08/18 1120 05/07/18 1307 05/07/18 1110       How much help from another person do you currently need...    Turning from your back to your side while in flat bed without using bedrails?  -- 2  -DM  --    Moving from lying on back to sitting on the side of a flat bed without bedrails?  -- 2  -DM  --    Moving to and from a bed to a chair (including a wheelchair)?  -- 2  -DM  --    Standing up  from a chair using your arms (e.g., wheelchair, bedside chair)?  -- 2  -DM  --    Climbing 3-5 steps with a railing?  -- 1  -DM  --    To walk in hospital room?  -- 3  -DM  --    AM-PAC 6 Clicks Score  -- 12  -DM  --       How much help from another is currently needed...    Putting on and taking off regular lower body clothing? 2  -KF  -- 2  -KF    Bathing (including washing, rinsing, and drying) 2  -KF  -- 2  -KF    Toileting (which includes using toilet bed pan or urinal) 2  -KF  -- 2  -KF    Putting on and taking off regular upper body clothing 2  -KF  -- 2  -KF    Taking care of personal grooming (such as brushing teeth) 3  -KF  -- 3  -KF    Eating meals 3  -KF  -- 3  -KF    Score 14  -KF  -- 14  -KF       Functional Assessment    Outcome Measure Options AM-PAC 6 Clicks Daily Activity (OT)  -KF AM-PAC 6 Clicks Basic Mobility (PT)  -DM AM-PAC 6 Clicks Daily Activity (OT)  -KF      User Key  (r) = Recorded By, (t) = Taken By, (c) = Cosigned By    Initials Name Provider Type    DM Bambi Cruz, PT Physical Therapist    FRANCIS Coles OT Occupational Therapist           Time Calculation:         Time Calculation- OT     Row Name 05/08/18 1313             Time Calculation- OT    OT Start Time 1120  -KF      Total Timed Code Minutes- OT 16 minute(s)  -KF      OT Received On 05/08/18  -KF      OT Goal Re-Cert Due Date 05/11/18  -KF        User Key  (r) = Recorded By, (t) = Taken By, (c) = Cosigned By    Initials Name Provider Type    FRANCIS Coles OT Occupational Therapist           Therapy Charges for Today     Code Description Service Date Service Provider Modifiers Qty    16076580748 HC OT THERAPEUTIC ACT EA 15 MIN 5/7/2018 Fatoumata Coles OT GO 1    33090159736 HC OT THERAPEUTIC ACT EA 15 MIN 5/8/2018 Fatoumata Coles OT GO 1    17136533217 HC OT THER SUPP EA 15 MIN 5/8/2018 Fatoumata Coles OT GO 1               Fatoumata Coles OT  5/8/2018

## 2018-05-08 NOTE — PROGRESS NOTES
Continued Stay Note  [facility]     Patient Name: Hector Mayfield Jr.  MRN: [id number]  Today's Date: 5/8/2018    Admit Date: 4/30/2018          Discharge Plan     Row Name 05/08/18 1217       Plan    Plan Comments Spoke with patient and wife about bed offer at [facility].  They said they are not interested in [facility] and they prefer the [facility] , [facility] or [facility].  I have called all three facilities.  No answer at [facility]/left voicemail with Nhung.  I spoke with Fariba at [facility] and both facilities want to see more progress with PT today.  I have asked PT to ambulate him today so rehab potential can be evaluated as soon as possible.                Discharge Codes    No documentation.           Thuy Garcia

## 2018-05-08 NOTE — PLAN OF CARE
Problem: Patient Care Overview  Goal: Plan of Care Review  Outcome: Ongoing (interventions implemented as appropriate)   05/08/18 1024   Coping/Psychosocial   Plan of Care Reviewed With patient;spouse   Plan of Care Review   Progress improving   OTHER   Outcome Summary BLE edema much improved; RLE without swelling, Lt ankle with min to trace edema. D/c compression wraps; switch to TEDs prn. D/c PT wound care / leg wraps.

## 2018-05-09 NOTE — PLAN OF CARE
Problem: Patient Care Overview  Goal: Plan of Care Review  Outcome: Ongoing (interventions implemented as appropriate)   05/09/18 0039   Coping/Psychosocial   Plan of Care Reviewed With patient;spouse   Plan of Care Review   Progress improving       Problem: Fluid Volume Deficit (Adult)  Goal: Identify Related Risk Factors and Signs and Symptoms  Outcome: Ongoing (interventions implemented as appropriate)   05/09/18 0039   Fluid Volume Deficit (Adult)   Related Risk Factors (Fluid Volume Deficit) advanced age   Signs and Symptoms (Fluid Volume Deficit) muscle weakness;urine concentrated;weight loss       Problem: Skin Injury Risk (Adult)  Goal: Identify Related Risk Factors and Signs and Symptoms  Outcome: Ongoing (interventions implemented as appropriate)   05/09/18 0039   Skin Injury Risk (Adult)   Related Risk Factors (Skin Injury Risk) advanced age;fluid intake inadequate;mobility impaired;nutritional deficiencies;tissue perfusion altered

## 2018-05-09 NOTE — PROGRESS NOTES
Continued Stay Note  [facility]     Patient Name: Hector Mayfield Jr.  MRN: [id number]  Today's Date: 5/9/2018    Admit Date: 4/30/2018          Discharge Plan     Row Name 05/09/18 1350       Plan    Plan [facility]    Patient/Family in Agreement with Plan yes    Plan Comments Spoke with Lay at [facility].  They have a bed available on [facility] on Thurs, 5/9, if medically ready.  RN to call report to [phone] and fax DC summary to [phone].  Family to transport.  CM will cont to follow.    Final Discharge Disposition Code 02 - short term hospital for  care              Discharge Codes    No documentation.           Guadalupe Coko

## 2018-05-09 NOTE — PROGRESS NOTES
"Clinical Nutrition   Reason For Visit: Follow-up protocol    Patient Name: Hector Mayfield Jr.  YOB: 1931  MRN: [id number]  Date of Encounter: 05/09/18 1:57 PM  Admission date: 4/30/2018    Nutrition Assessment     Hospital Problem List  Principal Problem:    Generalized weakness  Active Problems:    HTN (hypertension)    CHF (congestive heart failure)    Elevated troponin    Hyperglycemia    Elevated serum creatinine    Normocytic anemia    Spinal stenosis    Dehydration    Bilateral lower extremity edema    PVC (premature ventricular contraction)      PMH: He  has a past medical history of Congestive heart failure (CHF); Hypertension; and Non Hodgkin's lymphoma.   PSxH: He  has a past surgical history that includes Hernia repair and Prostate surgery.       Reported/Observed/Food/Nutrition Related History:     Patient in bathroom with RN at visit. Wife outside room reports patients appetite has been improving. Drinking boost supplements. Having BMs. No preferences at this time.      Anthropometrics   Height: 188 cm (74\")  Weight: 69.9 kg (154 lb 3.2 oz) - standing scale weight per charting (5/8)  BMI: 19.80 kg/m²  BMI classification: Normal: 18.5-24.9kg/m2            Nutrition Focused Physical Exam Findings: n/a    Labs reviewed   Labs reviewed: Yes      Results from last 7 days  Lab Units 05/09/18  0530 05/05/18  0502 05/04/18  0510   SODIUM mmol/L 139 142 142   POTASSIUM mmol/L 4.0 4.0 3.8   CHLORIDE mmol/L 106 111* 112*   CO2 mmol/L 26.0 26.0 25.0   BUN mg/dL 29* 20 22   CREATININE mg/dL 0.90 1.00 1.10   GLUCOSE mg/dL 85 101* 102*   CALCIUM mg/dL 8.2* 8.3* 8.6*       Results from last 7 days  Lab Units 05/04/18  0510   MAGNESIUM mg/dL 2.2         Medications reviewed   Medications reviewed: Yes    Intake/Ouptut 24 hrs (7:00AM - 6:59 AM)     Intake & Output (last day)       05/08 0701 - 05/09 0700 05/09 0701 - 05/10 0700    P.O. 120     Total Intake(mL/kg) 120 (1.7)     Urine (mL/kg/hr) 850 (0.5) " 250 (0.5)    Stool  0 (0)    Total Output 850 250    Net -730 -250          Unmeasured Stool Occurrence 1 x 1 x          Current Nutrition Prescription   PO: Diet Regular; Cardiac  Oral Nutrition Supplement: Boost Plus TID Strawberry or Chocolate    Evaluation of Received Nutrient/Fluid Intake:  PO Intake: 33%  # of meals: 3  Days PO Evaluated: from (5/8)    Nutrition Diagnosis     Problem Inadequate oral intake   Etiology Clinical intake   Signs/Symptoms PO Intake (33% of 3 meals)       Intervention     Goal:   General: Nutrition support treatment  PO: Increase intake    Intervention: Follow treatment progress, Care plan reviewed, Advise alternate selection, Interview for preferences, Encourage intake      Monitoring/Evaluation:       Monitoring/Evaluation: Per protocol, PO intake, Supplement intake  Will Continue to follow per protocol  Eva Valentin  Time Spent: 20 minutes

## 2018-05-09 NOTE — PROGRESS NOTES
[facility] Medicine Services  PROGRESS NOTE    Patient Name: Hector Mayfield Jr.  : 1931  MRN: [id number]    Date of Admission: 2018  Length of Stay: 8  Primary Care Physician: Terrence Salazar MD    Subjective   Subjective   CC:  F/u generalized weakness     HPI:  Patient ambulated 20 feet with PT yesterday.  Patient has received 2 offers, but the wife wants to wait and here from [facility].  Positive BM's.  Patient is eating.  Family in the room.  No adverse events overnight.  Just waiting for rehabilitation placement.    Review of Systems  Gen- No fevers, chills  CV- No chest pain, palpitations  Resp- No cough, dyspnea  GI-  No N/V/D, abd pain  - urine retention, danielson placed  Otherwise ROS is negative except as mentioned in the HPI.    Objective   Objective   Vital Signs:   Temp:  [97.6 °F (36.4 °C)-97.7 °F (36.5 °C)] 97.6 °F (36.4 °C)  Heart Rate:  [46-70] 70  Resp:  [16-18] 16  BP: (127-135)/(64-76) 135/76  Physical Exam:  General: Alert, well-developed thin frail ill-appearing male in no acute distress    Head: Normocephalic atraumatic    Eyes: PERRLA, EOMI, nonicteric, conjunctiva normal    ENT: Pink, moist mucous membranes    Neck: Supple, nontender, trachea midline without lymphadenopathy, JVD, nuchal rigidity.      Cardiovascular: RRR  no R/G +murmur  +1DP pulses bilaterally    Respiratory: Nonlabored, symmetrical chest expansion, clear to auscultation bilaterally but diminished in bases    Abdomen: Soft, nontender, nondistended,  positive bowel sounds in all 4 quadrants     Extremities: FROM in upper and lower extremities bilaterally. Trace May edema/cyanosis.  Negative calf pain    Skin: Pink/warm/dry.  No rash or lesions noted    Neuro: Oriented to person place time and situation, speech is clear, follows all commands, recent and remote memory intact    Psych: Patient is pleasant and cooperative.  Normal affect.  Negative suicidal ideation or homicidal  ideation.    Results Reviewed:  I have personally reviewed current lab, radiology, and data and agree.    Results from last 7 days  Lab Units 05/09/18  0530 05/03/18  0517   WBC 10*3/mm3 7.56 7.71   HEMOGLOBIN g/dL 8.7* 9.1*   HEMATOCRIT % 28.2* 30.1*   PLATELETS 10*3/mm3 305 167       Results from last 7 days  Lab Units 05/09/18  0530 05/05/18  0502 05/04/18  0510 05/03/18  0517   SODIUM mmol/L 139 142 142 142   POTASSIUM mmol/L 4.0 4.0 3.8 4.0   CHLORIDE mmol/L 106 111* 112* 110*   CO2 mmol/L 26.0 26.0 25.0 27.0   BUN mg/dL 29* 20 22 19   CREATININE mg/dL 0.90 1.00 1.10 1.10   GLUCOSE mg/dL 85 101* 102* 93   CALCIUM mg/dL 8.2* 8.3* 8.6* 8.7   ALT (SGPT) U/L  --   --   --  22   AST (SGOT) U/L  --   --   --  19     Estimated Creatinine Clearance: 58.3 mL/min (by C-G formula based on SCr of 0.9 mg/dL).      Results for orders placed during the hospital encounter of 04/30/18   Adult Transthoracic Echo Complete W/ Cont if Necessary Per Protocol    Narrative · Mild tricuspid valve regurgitation is present.  · Calculated right ventricular systolic pressure from tricuspid   regurgitation is 30 mmHg.        I have reviewed the medications.    Assessment/Plan   Assessment / Plan     Hospital Problem List     * (Principal)Generalized weakness    HTN (hypertension)    CHF (congestive heart failure)    Elevated troponin    Hyperglycemia    Elevated serum creatinine    Normocytic anemia    Spinal stenosis    Dehydration    Bilateral lower extremity edema    PVC (premature ventricular contraction)        Brief Hospital Course to date:  Hector Mayfield Jr. is a 86 y.o. male with a past medical history of chronic heart failure, BPH, hypertension, chronic constipation, and non-Hodgkin's lymphoma who presented to the [facility] emergency department late on 4/30 with progressive generalized weakness for the past month associated with recurrent falls without significant trauma, loss of consciousness, or injury.  Patient also  reported increasing peripheral edema and evaluation the ER disclosed mildly elevated BNP.  Patient was admitted for further evaluation and supportive care.    Assessment & Plan:  Progressive Generalized weakness with frequent falls  Soft Tissue injuries due to fall with left-sided pain  -No evidence of acute bony injuries on imaging   - 5/6/18 added voltaren gel and will give 5 days burst dose 20mg prednisone to help with inflammatory pain  --Awaiting final PT recs for placement    5/4/18 episode of Afib with RVR  - converted back to NSR with IV digoxin. BP normalized.  - Cards eval'd, added metoprolol (CHADsVASc = 2, no anticoagulation recommended).    - at d/c plan to f/u with Dr. Noe in 3 months    Chronic LV Diastolic heart failure  - Echo is normal, does not appear to be decompensated, suspect his peripheral edema is due to venous insufficiency/poor functional state/[poor nutrition   - His FeNA was suggestive of intravascular dehydration     Elevated troponin  -No evidence of ACS, likely related to CRISTO, Echo normal  - Continue aspirin, no direct indication for statin (FLP reviewed)    Nonoliguric Acute kidney injury, resolved  - Likely prerenal azotemia, s/p IVF    Urinary retention, likely due to immobility and additive constipation  -started on flomax  -had been requiring intermittent I/O cath, danielson now placed  --Can trial bladder training at rehab once more mobile  --Start voiding trial since some rehab will not do bladder training   -start on aggressive Bowel regimen   -followed by Dr Hamilton outpt Urology at [facility]    Constipation, resolved  -started on bowel rx, on 5/6/8 increased regimen and given mag citrate and suppository     Bilateral lower extremity edema, Asymmetric  - Duplex negative for DVT, Echo normal   - No proteinuria , No evidence of hepatic dysfunction   - Suspect venous stasis due to venous insufficiency and poor functional state  - PT wound care consulted for leg wraps that  were removed today    Normocytic anemia of chronic disease  - Hgb stable   --AM labs    Hypokalemia  --Resolved     Hypertension  - BP stable    DVT Prophylaxis:  SQ heparin  CODE STATUS: Full Code    Disposition: I expect the patient to be discharged to short term rehab when bed available.  Patient's wife wants [facility] to [facility] for rehabilitation.  Patient's wife states that she didn't know where [facility] came from, because she never requested [facility] and does not want to go there.  Requests further facilities were placed yesterday by  and followed up by another  today.  Patient ambulated 25ft with PT yesterday.  Wife is waiting to hea something from [facility] to make a decision.    Electronically signed by ULYSSES Arzate, 05/09/18, 12:16 PM.

## 2018-05-09 NOTE — THERAPY TREATMENT NOTE
Acute Care - Occupational Therapy Treatment Note  [facility]     Patient Name: Hector Mayfield Jr.  : 1931  MRN: [id number]  Today's Date: 2018  Onset of Illness/Injury or Date of Surgery: 18  Date of Referral to OT: 18  Referring Physician: ANGEL Hilton    Admit Date: 2018       ICD-10-CM ICD-9-CM   1. Dehydration E86.0 276.51   2. Elevated serum creatinine R79.89 790.99   3. Generalized weakness R53.1 780.79   4. Impaired mobility and activities of daily living Z74.09 799.89   5. Multiple contusions T07.XXXA 924.8   6. Impaired mobility and ADLs Z74.09 799.89   7. Impaired functional mobility, balance, gait, and endurance Z74.09 V49.89     Patient Active Problem List   Diagnosis   • HTN (hypertension)   • CHF (congestive heart failure)   • Elevated troponin   • Hyperglycemia   • Elevated serum creatinine   • Normocytic anemia   • Spinal stenosis   • Generalized weakness   • Dehydration   • Bilateral lower extremity edema   • PVC (premature ventricular contraction)     Past Medical History:   Diagnosis Date   • Congestive heart failure (CHF)    • Hypertension    • Non Hodgkin's lymphoma      Past Surgical History:   Procedure Laterality Date   • HERNIA REPAIR     • PROSTATE SURGERY         Therapy Treatment          Rehabilitation Treatment Summary     Row Name 18 1317             Treatment Time/Intention    Discipline occupational therapist  -KF      Document Type therapy note (daily note)  -KF      Subjective Information complains of;pain  -KF      Mode of Treatment occupational therapy  -KF      Patient/Family Observations sitting up in bed eating; wife present, RN present for danielson  -KF      Care Plan Review care plan/treatment goals reviewed;current/potential barriers reviewed;patient/other agree to care plan;risks/benefits reviewed  -KF      Care Plan Review, Other Participant(s) spouse  -KF      Patient Effort excellent  -KF      Comment monitored HR throughout  -KF       Existing Precautions/Restrictions fall  -KF      Treatment Considerations/Comments L UE, knee pain  -KF      Patient Response to Treatment tolerated  -KF      Recorded by [KF] Fatoumata Coles, OT 05/09/18 1421 05/09/18 1421      Row Name 05/09/18 1317             Vital Signs    Pre Systolic BP Rehab 135   RN cleared vitals stable  -KF      Pre Treatment Diastolic BP 76  -KF      Pretreatment Heart Rate (beats/min) 47  -KF      Posttreatment Heart Rate (beats/min) 49  -KF      O2 Delivery Pre Treatment room air  -KF      O2 Delivery Intra Treatment room air  -KF      O2 Delivery Post Treatment room air  -KF      Pre Patient Position Supine  -KF      Intra Patient Position Standing  -KF      Post Patient Position Sitting  -KF      Recorded by [KF] Fatoumata Coles, OT 05/09/18 1421 05/09/18 1421      Row Name 05/09/18 1317             Cognitive Assessment/Intervention    Additional Documentation Cognitive Assessment/Intervention (Group)  -KF      Recorded by [KF] Fatoumata Coles, OT 05/09/18 1421 05/09/18 1421      Row Name 05/09/18 1317             Cognitive Assessment/Intervention- PT/OT    Affect/Mental Status (Cognitive) anxious  -KF      Orientation Status (Cognition) oriented x 4  -KF      Follows Commands (Cognition) WFL  -KF      Cognitive Function (Cognitive) safety deficit  -KF      Attention Deficit (Cognitive) mild deficit  -KF      Safety Deficit (Cognitive) mild deficit;awareness of need for assistance;insight into deficits/self awareness;safety precautions awareness  -KF      Cognitive Interventions (Cognitive) occupation/activity based interventions;process/task specific training  -KF      Personal Safety Interventions fall prevention program maintained;gait belt;muscle strengthening facilitated;nonskid shoes/slippers when out of bed  -KF      Recorded by [KF] Fatoumata Coles, OT 05/09/18 1421 05/09/18 1421      Row Name 05/09/18 1317             Safety Issues, Functional Mobility    Safety  Issues Affecting Function (Mobility) awareness of need for assistance;insight into deficits/self awareness;positioning of assistive device;safety precaution awareness;steps too close to assistive device  -KF      Impairments Affecting Function (Mobility) balance;strength;pain;endurance/activity tolerance  -KF      Comment, Safety Issues/Impairments (Mobility) once standing improved functional mobility VC's for HP and seq  -KF      Recorded by [KF] Fatoumata Coles, OT 05/09/18 1421 05/09/18 1421      Row Name 05/09/18 1317             Bed Mobility Assessment/Treatment    Bed Mobility Assessment/Treatment rolling right;scooting/bridging;supine-sit  -KF      Rolling Right [place] (Bed Mobility) minimum assist (75% patient effort);verbal cues  -KF      Scooting/Bridging [place] (Bed Mobility) verbal cues;moderate assist (50% patient effort)  -KF      Supine-Sit [place] (Bed Mobility) moderate assist (50% patient effort)  -KF      Bed Mobility, Safety Issues decreased use of arms for pushing/pulling;decreased use of legs for bridging/pushing;impaired trunk control for bed mobility  -KF      Assistive Device (Bed Mobility) bed rails;draw sheet;head of bed elevated  -KF      Comment (Bed Mobility) assist for trunk control and scooting to EOB  -KF      Recorded by [KF] Fatoumata Coles, OT 05/09/18 1421 05/09/18 1421      Row Name 05/09/18 1317             Functional Mobility    Functional Mobility- Ind. Level minimum assist (75% patient effort);verbal cues required  -KF      Functional Mobility- Device rolling walker  -KF      Functional Mobility-Distance (Feet) 15  -KF      Functional Mobility- Safety Issues steps too close front assistive device;weight-shifting ability decreased;step length decreased  -KF      Recorded by [KF] Fatoumata Coles, OT 05/09/18 1421 05/09/18 1421      Row Name 05/09/18 1317             Transfer Assessment/Treatment    Transfer Assessment/Treatment sit-stand  transfer;stand-sit transfer  -KF      Sit-Stand [place] (Transfers) moderate assist (50% patient effort);2 person assist  -KF      Stand-Sit [place] (Transfers) moderate assist (50% patient effort)  -KF      Recorded by [KF] Fatoumata Coles, OT 05/09/18 1421 05/09/18 1421      Row Name 05/09/18 1317             Sit-Stand Transfer    Assistive Device (Sit-Stand Transfers) walker, front-wheeled  -KF      Recorded by [KF] Fatoumata Coles, OT 05/09/18 1421 05/09/18 1421      Row Name 05/09/18 1317             Stand-Sit Transfer    Assistive Device (Stand-Sit Transfers) walker, front-wheeled   postural cues; posterior lean  -KF      Recorded by [KF] Fatoumata Coles, OT 05/09/18 1421 05/09/18 1421      Row Name 05/09/18 1317             Motor Skills Assessment/Interventions    Additional Documentation Balance (Group);Balance Interventions (Group);Therapeutic Exercise (Group);Therapeutic Exercise Interventions (Group)  -KF      Recorded by [KF] Fatoumata Coles, OT 05/09/18 1421 05/09/18 1421      Row Name 05/09/18 1317             Therapeutic Exercise    Therapeutic Exercise seated, upper extremities  -KF      Additional Documentation Therapeutic Exercise (Row)  -KF      Recorded by [KF] Fatoumata Coles, OT 05/09/18 1421 05/09/18 1421      Row Name 05/09/18 1317             Upper Extremity Seated Therapeutic Exercise    Performed, Seated Upper Extremity (Therapeutic Exercise) elbow flexion/extension;shoulder flexion/extension;scapular protraction/retraction;shoulder horizontal abduction/adduction  -KF      Exercise Type, Seated Upper Extremity (Therapeutic Exercise) AROM (active range of motion);AAROM (active assistive range of motion)  -KF      Expected Outcomes, Seated Upper Extremity (Therapeutic Exercise) improve functional tolerance, self-care activity;improve performance, BADLs;strengthen normal movement patterns  -KF      Sets/Reps Detail, Seated Upper Extremity (Therapeutic Exercise) 1/15  -KF       Transfers Skills, Training to Functional Activity, Seated Upper Extremity (Therapeutic Exercise) beginning to transfer skills to functional activity  -KF      Comment, Seated Upper Extremity (Therapeutic Exercise) AAROM L UE for full range; AROM R UE  -KF      Recorded by [KF] Fatoumata Coles, OT 05/09/18 1421 05/09/18 1421      Row Name 05/09/18 1317             Balance    Balance static sitting balance;static standing balance  -KF      Recorded by [KF] Fatoumata Coles, OT 05/09/18 1421 05/09/18 1421      Row Name 05/09/18 1317             Static Sitting Balance    Level of [place] (Unsupported Sitting, Static Balance) standby assist  -KF      Sitting Position (Unsupported Sitting, Static Balance) sitting on edge of bed  -KF      Time Able to Maintain Position (Unsupported Sitting, Static Balance) 1 to 2 minutes  -KF      Recorded by [KF] Fatoumata Coles, OT 05/09/18 1421 05/09/18 1421      Row Name 05/09/18 1317             Static Standing Balance    Level of [place] (Supported Standing, Static Balance) contact guard assist  -KF      Time Able to Maintain Position (Supported Standing, Static Balance) 2 to 3 minutes  -KF      Assistive Device Utilized (Supported Standing, Static Balance) rolling walker  -KF      Recorded by [KF] Fatoumata Coles, OT 05/09/18 1421 05/09/18 1421      Row Name 05/09/18 1317             Dynamic Balance Activity    Therapeutic Training Performed (Dynamic Balance) backward walking  -KF      Support Needed for Balance (Dynamic Balance Training) uses both upper extremities for support  -KF      Progressive Balance Activity (Dynamic Balance Training) combined head and eye movements during activity  -KF      Recorded by [KF] Fatoumata Coles, OT 05/09/18 1421 05/09/18 1421      Row Name 05/09/18 1317             Positioning and Restraints    Pre-Treatment Position in bed  -KF      Post Treatment Position chair  -KF      In Chair notified nsg;reclined;sitting;call light  within reach;encouraged to call for assist;with family/caregiver;exit alarm on;waffle cushion;legs elevated;on mechanical lift sling  -KF      Recorded by [KF] Fatoumata Coles, OT 05/09/18 1421 05/09/18 1421      Row Name 05/09/18 1317             Pain Assessment    Additional Documentation Pain Scale: FACES Pre/Post-Treatment (Group)  -KF      Recorded by [KF] Fatoumata Coles, OT 05/09/18 1421 05/09/18 1421      Row Name 05/09/18 1317             Pain Scale: Numbers Pre/Post-Treatment    Pain Location - Side Bilateral  -KF      Pain Location - Orientation generalized  -KF      Pain Location knee  -KF      Pain Intervention(s) Repositioned;Ambulation/increased activity  -KF      Recorded by [KF] Fatoumata Coles, OT 05/09/18 1421 05/09/18 1421      Row Name 05/09/18 1317             Pain Scale: FACES Pre/Post-Treatment    Pain: FACES Scale, Pretreatment 2-->hurts little bit  -KF      Pain: FACES Scale, Post-Treatment 2-->hurts little bit  -KF      Recorded by [KF] Fatoumata Coles, OT 05/09/18 1421 05/09/18 1421      Row Name 05/09/18 1317             Plan of Care Review    Plan of Care Reviewed With patient;spouse  -KF      Recorded by [KF] Fatoumata Coles, OT 05/09/18 1421 05/09/18 1421      Row Name 05/09/18 1317             Outcome Summary/Treatment Plan (OT)    Daily Summary of Progress (OT) progress toward functional goals is good  -KF      Plan for Continued Treatment (OT) cont IPOT per POC  -KF      Recorded by [KF] Fatoumata Coles, OT 05/09/18 1421 05/09/18 1421        User Key  (r) = Recorded By, (t) = Taken By, (c) = Cosigned By    Initials Name Effective Dates Discipline     Fatoumata Coles, OT 04/03/18 -  OT               Occupational Therapy Education     Title: PT OT SLP Therapies (Active)     Topic: Occupational Therapy (Active)     Point: ADL training (Active)     Description: Instruct learner(s) on proper safety adaptation and remediation techniques during self care or transfers.    Instruct in proper use of assistive devices.   Learning Progress Summary     Learner Status Readiness Method Response Comment Documented by    Patient Done Acceptance E VU Pt education provided on sequencing and safety with RW for functional transfers and functional mobility to improve I in ADLs req standing such as LB dressing and toileting.  05/09/18 1422     Done Acceptance E VU Pt educated on HEP for BUE, body mechanics for bed mobility, and purpose of OT services today  05/07/18 1423     Active Acceptance E,D NR   05/02/18 1249     Done Acceptance E,D VU,NR Education intitiated for benefits of OOB activity/therapy, role of OT, ROM HEP to support ADLs, and recommendation for IRF at d/c  05/01/18 1524    Family Done Acceptance E VU Pt educated on HEP for BUE, body mechanics for bed mobility, and purpose of OT services today  05/07/18 1423     Done Acceptance E,D VU,NR Education intitiated for benefits of OOB activity/therapy, role of OT, ROM HEP to support ADLs, and recommendation for IRF at d/c  05/01/18 1524    Significant Other Active Acceptance E,D NR   05/02/18 1249          Point: Home exercise program (Active)     Description: Instruct learner(s) on appropriate technique for monitoring, assisting and/or progressing therapeutic exercises/activities.   Learning Progress Summary     Learner Status Readiness Method Response Comment Documented by    Patient Done Acceptance E VU Pt educated on HEP for BUE, body mechanics for bed mobility, and purpose of OT services today  05/07/18 1423     Active Acceptance E NR Pt educated on HEP for AAROM and AAROM BUE to improve activity tolerance for ADLs while in supine  05/02/18 1439     Active Acceptance E,D NR   05/02/18 1249     Done Acceptance E,D VU,NR Education intitiated for benefits of OOB activity/therapy, role of OT, ROM HEP to support ADLs, and recommendation for IRF at d/c  05/01/18 1524    Family Done Acceptance E VU Pt educated on HEP  for BUE, body mechanics for bed mobility, and purpose of OT services today KF 05/07/18 1423     Active Acceptance E NR Pt educated on HEP for AAROM and AAROM BUE to improve activity tolerance for ADLs while in supine KF 05/02/18 1439     Done Acceptance E,D VU,NR Education intitiated for benefits of OOB activity/therapy, role of OT, ROM HEP to support ADLs, and recommendation for IRF at d/c TB 05/01/18 1524    Significant Other Active Acceptance E,D NR  DM 05/02/18 1249          Point: Precautions (Done)     Description: Instruct learner(s) on prescribed precautions during self-care and functional transfers.   Learning Progress Summary     Learner Status Readiness Method Response Comment Documented by    Patient Done Acceptance E VU Pt education provided on sequencing and safety with RW for functional transfers and functional mobility to improve I in ADLs req standing such as LB dressing and toileting.  05/09/18 1422     Done Acceptance E VU,NR Pt educated on body mechanics and safety awareness with functional transfer using RW and purpose of OT services to improve functional mobility. KF 05/08/18 1312          Point: Body mechanics (Done)     Description: Instruct learner(s) on proper positioning and spine alignment during self-care, functional mobility activities and/or exercises.   Learning Progress Summary     Learner Status Readiness Method Response Comment Documented by    Patient Done Acceptance E VU Pt education provided on sequencing and safety with RW for functional transfers and functional mobility to improve I in ADLs req standing such as LB dressing and toileting.  05/09/18 1422     Done Acceptance E VU,NR Pt educated on body mechanics and safety awareness with functional transfer using RW and purpose of OT services to improve functional mobility.  05/08/18 1312                      User Key     Initials Effective Dates Name Provider Type Discipline     06/22/15 -  Francisca Lopez, OT  Occupational Therapist OT    DM 06/19/15 -  Bambi Cruz, PT Physical Therapist PT    KF 04/03/18 -  Fatoumata Coles, OT Occupational Therapist OT                OT Recommendation and Plan  Outcome Summary/Treatment Plan (OT)  Daily Summary of Progress (OT): progress toward functional goals is good  Barriers to Overall Progress (OT): pain  Plan for Continued Treatment (OT): cont IPOT per POC  Daily Summary of Progress (OT): progress toward functional goals is good  Plan of Care Review  Plan of Care Reviewed With: patient  Plan of Care Reviewed With: patient  Outcome Summary: Pt completed functional mob 15 ft with min A at RW and mod Ax2 for sit to stand transfers in order to improve I in ADLs at RW. Recom cont IPOT per POC        Outcome Measures     Row Name 05/09/18 1317 05/08/18 1500 05/08/18 1120       How much help from another person do you currently need...    Turning from your back to your side while in flat bed without using bedrails?  -- 3  -DHAVAL  --    Moving from lying on back to sitting on the side of a flat bed without bedrails?  -- 2  -DHAVAL  --    Moving to and from a bed to a chair (including a wheelchair)?  -- 3  -DHAVAL  --    Standing up from a chair using your arms (e.g., wheelchair, bedside chair)?  -- 2  -DHAVAL  --    Climbing 3-5 steps with a railing?  -- 1  -DHAVAL  --    To walk in hospital room?  -- 3  -DHAVAL  --    AM-[facility] 6 Clicks Score  -- 14  -DHAVAL  --       How much help from another is currently needed...    Putting on and taking off regular lower body clothing? 2  -KF  -- 2  -KF    Bathing (including washing, rinsing, and drying) 2  -KF  -- 2  -KF    Toileting (which includes using toilet bed pan or urinal) 1   danielson  -KF  -- 2  -KF    Putting on and taking off regular upper body clothing 3  -KF  -- 2  -KF    Taking care of personal grooming (such as brushing teeth) 3  -KF  -- 3  -KF    Eating meals 3  -KF  -- 3  -KF    Score 14  -KF  -- 14  -KF       Functional Assessment    Outcome Measure Options  AM-[facility] 6 Clicks Daily Activity (OT)  -KF  -- AM-[facility] 6 Clicks Daily Activity (OT)  -KF    Row Name 05/07/18 1307 05/07/18 1110          How much help from another person do you currently need...    Turning from your back to your side while in flat bed without using bedrails? 2  -DM  --     Moving from lying on back to sitting on the side of a flat bed without bedrails? 2  -DM  --     Moving to and from a bed to a chair (including a wheelchair)? 2  -DM  --     Standing up from a chair using your arms (e.g., wheelchair, bedside chair)? 2  -DM  --     Climbing 3-5 steps with a railing? 1  -DM  --     To walk in hospital room? 3  -DM  --     AM-[facility] 6 Clicks Score 12  -DM  --        How much help from another is currently needed...    Putting on and taking off regular lower body clothing?  -- 2  -KF     Bathing (including washing, rinsing, and drying)  -- 2  -KF     Toileting (which includes using toilet bed pan or urinal)  -- 2  -KF     Putting on and taking off regular upper body clothing  -- 2  -KF     Taking care of personal grooming (such as brushing teeth)  -- 3  -KF     Eating meals  -- 3  -KF     Score  -- 14  -KF        Functional Assessment    Outcome Measure Options AM-[facility] 6 Clicks Basic Mobility (PT)  -DM AM-[facility] 6 Clicks Daily Activity (OT)  -KF       User Key  (r) = Recorded By, (t) = Taken By, (c) = Cosigned By    Initials Name Provider Type    DHAVAL Metzger, PT Physical Therapist    IAN Cruz, PT Physical Therapist    FRANCIS Coles, OT Occupational Therapist           Time Calculation:         Time Calculation- OT     Row Name 05/09/18 1424             Time Calculation- OT    OT Start Time 1317  -KF      Total Timed Code Minutes- OT 19 minute(s)  -KF      OT Received On 05/09/18  -KF      OT Goal Re-Cert Due Date 05/11/18  -KF        User Key  (r) = Recorded By, (t) = Taken By, (c) = Cosigned By    Initials Name Provider Type    FRANCIS Coles, OT Occupational Therapist            Therapy Charges for Today     Code Description Service Date Service Provider Modifiers Qty    61642241964 HC OT THERAPEUTIC ACT EA 15 MIN 5/8/2018 Fatoumata Coles, OT GO 1    71539224202 HC OT THER SUPP EA 15 MIN 5/8/2018 Fatoumata Coles, OT GO 1    23513482599 HC OT THERAPEUTIC ACT EA 15 MIN 5/9/2018 Fatoumata Coles, OT GO 1    54396675770 HC OT THER SUPP EA 15 MIN 5/9/2018 Fatoumata Coles, OT GO 1               Fatoumata Coles OT  5/9/2018

## 2018-05-09 NOTE — NURSING NOTE
Patient seemed to have some slurred speech this morning when giving 6AM meds. Pt was drowsy, had just woken up and had a dry mouth from sleeping with dentures in. Assessed pt's neuro status, pt was alert and oriented x4. His hand  were equal but weak (same as they were on initial assessment) and his feet pushes/pulls were also equal. No one sided weakness or defecit was not. The patient's smile was also symmetric and tongue midline. Called charge nurse Nieves to have her assess patient as well. She thought patient was ok and still at his baseline just drowsy with a dry mouth. Patient's speech seemed to clear up the more he talked. Vitals are stable. Will continue to monitor.  -Aliyah Umaña RN

## 2018-05-09 NOTE — PLAN OF CARE
Problem: Patient Care Overview  Goal: Plan of Care Review  Outcome: Ongoing (interventions implemented as appropriate)   05/09/18 5655   Coping/Psychosocial   Plan of Care Reviewed With patient   Plan of Care Review   Progress no change   OTHER   Outcome Summary VSS, Bladder training initiated this shift, D/C to [facility] 5/10

## 2018-05-10 NOTE — PLAN OF CARE
Problem: Patient Care Overview  Goal: Plan of Care Review  Outcome: Ongoing (interventions implemented as appropriate)   05/10/18 0458   Coping/Psychosocial   Plan of Care Reviewed With patient   Plan of Care Review   Progress improving   OTHER   Outcome Summary no changes/complaints overnight, VSS, continue bladder training, plan for discharge 5/10/2018 to [facility]

## 2018-05-10 NOTE — DISCHARGE PLACEMENT REQUEST
"CANDY ABRAHAM, RN    [phone]          Terry Hammond Jr. (86 y.o. Male)     Date of Birth Social Security Number Address Home Phone MRN    08/04/1931  [address] [place] [id number] [phone] [id number]    Buddhist Marital Status          None        Admission Date Admission Type Admitting Provider Attending Provider Department, Room/Bed    4/30/18 Emergency Ankita Hernández DO Atkins, Rebecca L, DO [facility] 4G, S457/1    Discharge Date Discharge Disposition Discharge Destination         Rehab Facility or Unit (DC - External)              Attending Provider:  Ankita Hernández DO    Allergies:  No Known Allergies    Isolation:  None   Infection:  None   Code Status:  FULL    Ht:  188 cm (74\")   Wt:  69.9 kg (154 lb 3.2 oz)    Admission Cmt:  None   Principal Problem:  Generalized weakness [R53.1]                 Active Insurance as of 4/30/2018     Primary Coverage     Payor Plan Insurance Group Employer/Plan Group    MEDICARE MEDICARE A & B      Payor Plan Address Payor Plan Phone Number Effective From Effective To    [address] [phone] 8/1/1996     [place] [address]       Subscriber Name Subscriber Birth Date Member ID       TERRY HAMMOND JR. 8/4/1931 [id number]           Secondary Coverage     Payor Plan Insurance Group Employer/Plan Group    [facility] [id number]     Payor Plan Address Payor Plan Phone Number Effective From Effective To    [address] [address] [phone] 4/30/2018     [place] [id number]       Subscriber Name Subscriber Birth Date Member ID       HAMMONDTERRY WHITEHEAD JR. 8/4/1931 [id number]                 Emergency Contacts      (Rel.) Home Phone Work Phone Mobile Phone    Xochitl Stahl (Daughter) [phone] -- --    Debby Hammond (Spouse) [phone] -- --               Discharge Summary      ULYSSES Aparicio at 5/10/2018 12:06 PM              [facility] Medicine " Services  DISCHARGE SUMMARY    Patient Name: Hector Mayfield Jr.  : 1931  MRN: [id number]    Date of Admission: 2018  Date of Discharge:  5/10/2018  Primary Care Physician: Terrence Salazar MD    Consults     Date and Time Order Name Status Description    2018 1224 Inpatient Cardiology Consult Completed         Hospital Course     Presenting Problem:   Dehydration [E86.0]    Active Hospital Problems (** Indicates Principal Problem)    Diagnosis Date Noted   • **Generalized weakness [R53.1] 2018   • HTN (hypertension) [I10] 2018   • CHF (congestive heart failure) [I50.9] 2018   • Elevated troponin [R74.8] 2018   • Hyperglycemia [R73.9] 2018   • Elevated serum creatinine [R79.89] 2018   • Normocytic anemia [D64.9] 2018   • Spinal stenosis [M48.00] 2018   • Dehydration [E86.0] 2018   • Bilateral lower extremity edema [R60.0] 2018   • PVC (premature ventricular contraction) [I49.3] 2018      Resolved Hospital Problems    Diagnosis Date Noted Date Resolved   No resolved problems to display.          Hospital Course:  Hector Mayfield Jr. is a 86 y.o. male with a past medical history significant for congestive heart failure, BPH, HTN, and non-Hodgkin's lymphoma (in remission since radiation in ) who presented to [facility] with complaints of generalized weakness over one month.  The patient reported that he fell on 2018 and has had recurrent falls since.  He denies any head trauma or loss of consciousness.  He denied being evaluated for his falls prior to his arrival to the emergency department.  The patient was also complaining of some bilateral lower extremity edema.  Evaluation in the emergency department revealed a marginally elevated troponin at 0.04, , BUN/creatinine 35 and 1.9 respectively.  Imaging showed no acute changes or fractures and he had a stable EKG.  He was admitted to Hospital medicine  services for further evaluation and treatment.  He was diuresed for his congestive heart failure/edema.  He was seen in consultation by physical therapy and occupational therapy due to his generalized weakness and falls.  He did have a CT of the cervical spine that demonstrated acquired spinal stenosis and bulging at C6 and C7.  He was seen in consultation by cardiology due to an episode of A. fib with RVR on 5/4/2018.  He converted back to normal sinus rhythm with IV digoxin.  Metoprolol was added.  His chads vasc score was 2 and no anticoagulation was recommended.  He will follow-up with Dr. Noe in 3 months.  The patient did develop some urinary retention likely due to immobility and constipation.  He was initiated on Flomax and had been intermittently in and out cathetering.  Avila has since been placed.  He will be sent to rehabilitation with this Avila in place and will begin bladder training once he is more mobile.  The patient does follow with Dr. Hamilton with outpatient urology at [facility] and come follow up with him if this continues.  Patient is now medically stable and ready for a short-term stay at rehabilitation.  He has been accepted to [facility] and will be transferred there today by his family.  He will need follow-up with his primary care provider once he is discharged from rehabilitation.           Day of Discharge     HPI:   Patient resting comfortably in bed with family at bedside.  No new complaints.  Ready to go to rehab today.      Review of Systems  Gen- No fevers, chills  CV- No chest pain, palpitations  Resp- No cough, dyspnea  GI- No N/V/D, abd pain      Otherwise ROS is negative except as mentioned in the HPI.    Vital Signs:   Temp:  [97.6 °F (36.4 °C)-97.8 °F (36.6 °C)] 97.8 °F (36.6 °C)  Heart Rate:  [43-51] 43  Resp:  [16-18] 16  BP: (126-141)/(51-73) 126/51     Physical Exam:  Constitutional: No acute distress, awake, alert  HENT: NCAT, mucous membranes  moist  Respiratory: Clear to auscultation bilaterally, respiratory effort normal   Cardiovascular: RRR, no murmurs, rubs, or gallops, palpable pedal pulses bilaterally  Gastrointestinal: Positive bowel sounds, soft, nontender, nondistended  Musculoskeletal: No bilateral ankle edema  Psychiatric: Appropriate affect, cooperative  Neurologic: Oriented x 3, strength symmetric in all extremities, Cranial Nerves grossly intact to confrontation, speech clear  Skin: No rashes      Pertinent  and/or Most Recent Results       Results from last 7 days  Lab Units 05/10/18  0525 05/09/18  0530 05/05/18  0502 05/04/18  0510   WBC 10*3/mm3 7.99 7.56  --   --    HEMOGLOBIN g/dL 8.8* 8.7*  --   --    HEMATOCRIT % 28.5* 28.2*  --   --    PLATELETS 10*3/mm3 341 305  --   --    SODIUM mmol/L  --  139 142 142   POTASSIUM mmol/L  --  4.0 4.0 3.8   CHLORIDE mmol/L  --  106 111* 112*   CO2 mmol/L  --  26.0 26.0 25.0   BUN mg/dL  --  29* 20 22   CREATININE mg/dL  --  0.90 1.00 1.10   GLUCOSE mg/dL  --  85 101* 102*   CALCIUM mg/dL  --  8.2* 8.3* 8.6*           Invalid input(s): PROT, LABALBU        Invalid input(s): TG, LDLCALC, LDLREALC      Brief Urine Lab Results  (Last result in the past 365 days)      Color   Clarity   Blood   Leuk Est   Nitrite   Protein   CREAT   Urine HCG        05/01/18 0741             93.3       05/01/18 0741 Yellow Clear Negative Negative Negative Trace(A)               Microbiology Results Abnormal     None          Imaging Results (all)     Procedure Component Value Units Date/Time    US Renal Limited [[id number]] Collected:  05/02/18 1054     Updated:  05/02/18 1154    Narrative:       EXAMINATION: US RENAL LIMITED- 05/02/2018     INDICATION: E86.0-Dehydration; R79.89-Other specified abnormal findings  of blood chemistry; R53.1-Weakness; Z74.09-Other reduced mobility;  T07.XXXA-Unspecified multiple injuries, initial encounter     COMPARISON: NONE     FINDINGS:   1. The right kidney measures 9.4 x 4.7 x 4.5 cm.  There is 1.3 cm of  cortical thickness. The right kidney is smooth without solid mass.     There is, however, a large upper pole cyst projecting from the upper  aspect of the right kidney with slight shaggy contours measuring 8.2 x  5.4 x 5.6 cm. Septations are not identified and no solid component is  noted. Further, there is no obstruction to the right kidney.  2. The left kidney measures 10.4 x 5.4 x 4.4 cm. There is 1.8 cm of  cortical thickness. There is no solid mass, dominant cyst or  hydronephrosis associated with the left kidney.  3. The bladder exam is unremarkable. There is a considerable volume of  urine in the bladder.       Impression:       1. Large right upper pole renal cyst without complicating factors or  features.  2. Normal-appearing kidneys otherwise with normal size and parenchymal  complement. Renal obstruction is not identified on either side.  3. Bladder is unremarkable and mildly distended with urine.      D:  05/02/2018  E:  05/02/2018     This report was finalized on 5/2/2018 11:52 AM by Dr. Kishor West MD.       XR Tibia Fibula 2 View Left [[id number]] Collected:  04/30/18 2101     Updated:  04/30/18 2206    Narrative:       EXAM:    XR Left Tibia and Fibula, 2 Views    CLINICAL HISTORY:    86 years old, male; Pain; Lower leg; Left; Additional info: Fall, left lower   leg pain    TECHNIQUE:    Frontal and lateral views of the left tibia and fibula.    COMPARISON:    No relevant prior studies available.    FINDINGS:    Bones/joints:  Minimal DJD of knee.  Knee joint effusion.  No dislocation or   acute fracture of tibia or fibula.  Small tibial and patellar osteophyte   formation.  Minimal osteophytes at dorsal aspect of talus and navicular bones.    Soft tissues:  Soft tissue swelling.  No radiodense foreign body.    Vasculature:  Scattered atherosclerotic placques are seen along some vessels.      Impression:       1.  Minimal DJD of knee.  2.  Knee joint effusion.  3.  Soft tissue  swelling.  4.  No dislocation or acute fracture of tibia or fibula.    THIS DOCUMENT HAS BEEN ELECTRONICALLY SIGNED BY PALOMA ROMAN MD    XR Knee 1 or 2 View Left [[id number]] Collected:  04/30/18 2057     Updated:  04/30/18 2200    Narrative:       EXAM:    XR Left Knee, 1 or 2 views    CLINICAL HISTORY:    86 years old, male; Pain; Knee; Left; Additional info: Left knee pain S/P fall    TECHNIQUE:    Frontal and/or lateral views of the left knee.    COMPARISON:    No relevant prior studies available.    FINDINGS:    Bones/joints:  Joint effusion.  Small osteophytes of patella and tibia.    Minimal DJD.  No dislocation or acute fracture visualized.    Soft tissues:  Soft tissue swelling.  No radiodense foreign body.    Vasculature:  Scattered atherosclerotic placques are seen along some vessels.      Impression:       1.  Soft tissue swelling.  2.  Joint effusion.  3.  Minimal DJD.  4.  No dislocation or acute fracture visualized.    THIS DOCUMENT HAS BEEN ELECTRONICALLY SIGNED BY PALOMA ROMAN MD    XR Humerus Left [[id number]] Collected:  04/30/18 2056     Updated:  04/30/18 2158    Narrative:       EXAM:    XR Left Humerus, 2 or More Views    CLINICAL HISTORY:    86 years old, male; Pain; Upper arm; Left; Additional info: Fall, left upper   arm pain    TECHNIQUE:    Frontal and lateral views of the left humerus.    COMPARISON:    No relevant prior studies available.    FINDINGS:    Bones/joints:  DJD of left glenohumeral and acromioclavicular joints.  No   dislocation.  No acute fracture visualized.  No significant elbow joint   effusion visualized.    Soft tissues:  Mild soft tissue swelling.  No radiodense foreign body.      Impression:       1.  DJD of left glenohumeral and acromioclavicular joints.  2.  Mild soft tissue swelling.  3.  No dislocation or acute fracture visualized.    THIS DOCUMENT HAS BEEN ELECTRONICALLY SIGNED BY PALOMA ROMAN MD    CT Head Without Contrast [[id number]] Collected:   04/30/18 2016     Updated:  04/30/18 215    Narrative:       EXAM:    CT Head Without Intravenous Contrast    CLINICAL HISTORY:    86 years old, male; Injury or trauma; Fall; Initial encounter; Blunt trauma   (contusions or hematomas); Additional info: Syncope    TECHNIQUE:    Axial computed tomography images of the head/brain without intravenous   contrast.  All CT scans at this facility use one or more dose reduction   techniques, viz.: automated exposure control; ma/kV adjustment per patient size   (including targeted exams where dose is matched to indication; i.e. head); or   iterative reconstruction technique.    Coronal and sagittal reformatted images were created and reviewed.    COMPARISON:    No relevant prior studies available.    FINDINGS:    Brain:  Mild cerebral and cerebellar atrophy.  No acute intracranial   hemorrhage.  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  No midline shift of the brain, acute mass effect, or   downward herniation.  The gray-white matter differentiation pattern is   preserved.    Ventricles:  No ventriculomegaly.    Bones/joints:  DJD of atlantoaxial joint.  No acute fracture of calvarium.    In regards to complete findings of the cervical spine, please refer to specific   CT Cervical Spine report today.    Soft tissues:  No significant soft tissue swelling of the scalp.    Vasculature:  Scattered atherosclerotic placques are seen along some vessels.    Sinuses:  No definite acute sinusitis, as visualized.    Mastoid air cells:  Unremarkable as visualized.  No mastoid effusion.    Orbits:  Visualized orbits are unremarkable.    Nasal cavity/septum:  Slight deviation of nasal septum.    Dental:  Patient is nearly edentulous.      Impression:       1.  Mild cerebral and cerebellar atrophy.  2.  No acute intracranial hemorrhage.  3.  Microvascular ischemic  disease of supratentorial brain.    THIS DOCUMENT HAS BEEN ELECTRONICALLY SIGNED BY PALOMA ROMAN MD    CT Cervical Spine Without Contrast [[id number]] Collected:  04/30/18 2059     Updated:  04/30/18 2146    Narrative:       EXAM:    CT Cervical Spine Without Intravenous Contrast    CLINICAL HISTORY:    86 years old, male; Injury or trauma; Fall; Initial encounter; Blunt trauma;   Additional info: Neck pain S/P fall    TECHNIQUE:    Axial computed tomography images of the cervical spine without intravenous   contrast.  All CT scans at this facility use one or more dose reduction   techniques, viz.: automated exposure control; ma/kV adjustment per patient size   (including targeted exams where dose is matched to indication; i.e. head); or   iterative reconstruction technique.    Coronal and sagittal reformatted images were created and reviewed.    COMPARISON:    No relevant prior studies available.    FINDINGS:    Limitations:  Motion artifact does somewhat limit the sensitivity of this   examination.    Vertebrae:  No acute fracture visualized.  Mild anterolisthesis C6-C7, C7-T1,   T1-T2, of uncertain chronicity.  DJD atlantoaxial joint.  Degenerative benign   cystic changes at odontoid.  Osteophytes are scattered along the spine.    Thickening of transverse ligament bordering the odontoid.  Multilevel facet   joint DJD.  No dislocation.    Discs/spinal canal/neural foramina:  Multilevel degenerative disc narrowing   bordered by osteophytes at C3-C4, C4-C5, C5-C6, C6-C7, C7-T1, T1-T2.    Multifactorial multilevel acquired spinal stenosis related to osteophytes and   subluxation (at some levels) causing narrowing of several lateral neural   foramina and spinal canal.  Suspect C6-C7 disc bulging.    Soft tissues:  Soft tissue swelling of the nasal turbinates, which could   relate to allergic rhinitis.  There is no significant    prevertebral/paravertebral soft tissue swelling.    Vasculature:  Scattered  atherosclerotic placques are seen along some vessels.    Sinuses:  Mild right maxillary sinusitis.    Mastoid air cells:  Visualized mastoids are clear.    Dental:  Patient is nearly edentulous.    Nasal cavity/septum:  Mild deviation of the nasal septum.    Lung apices:  Biapical scarring.  Wedge-shaped infiltrate/atelectasis or   scarring at the apex RUL.    Other findings:  Airway is satisfactory.        Impression:       1.  No acute fracture visualized.  2.  Mild anterolisthesis C6-C7, C7-T1, T1-T2, uncertain chronicity.  3.  Multilevel DDD and facet DJD.  4.  Multilevel acquired spinal stenosis.  5.  Suspect C6-C7 disc bulging.  6.  Mild right maxillary sinusitis.    THIS DOCUMENT HAS BEEN ELECTRONICALLY SIGNED BY PALOMA ROMAN MD    XR Chest 1 View [[id number]] Collected:  04/30/18 2017     Updated:  04/30/18 2119    Narrative:       EXAM:    XR Chest, 1 View    CLINICAL HISTORY:    86 years old, male; Signs and symptoms; Other: Syncope; Additional info:   Syncope, generalized weakness    TECHNIQUE:    Frontal view of the chest.    COMPARISON:    CR - XR CHEST PA AND LATERAL 2013-08-10 19:29    FINDINGS:    Lungs:  No acute infiltrates.  No pneumonia or CHF.  Old granulomatous   disease lung.    Pleural space:  No pleural effusion or pneumothorax.    Heart:  Suboptimal inspiratory effort restricts accurate heart size   evaluation, and it appears somewhat accentuated technically.  However, suspect   mild cardiomegaly.    Mediastinum:  No acute mediastinal abnormality compared to prior study.    Bones/joints:  Minimal scoliosis.  Degenerative changes of the spine.  Mild   DJD of bilateral acromioclavicular and glenohumeral joints.    Vasculature:  Mild aortic tortuosity.    Lymph nodes:  Calcified granuloma at right tracheobronchial angle, (azygos   lymph node region) from old granulomatous disease.      Impression:       1.  Suspect mild cardiomegaly.  2.  No pneumonia or CHF.    THIS DOCUMENT HAS BEEN  ELECTRONICALLY SIGNED BY PALOMA ROMAN MD          Results for orders placed during the hospital encounter of 04/30/18   Duplex Venous Lower Extremity - Bilateral CAR    Narrative · Normal bilateral lower extremity venous duplex scan.       All veins in bilateral lower extremities appear compressible and show no   obvious evidence of acute DVT.          Results for orders placed during the hospital encounter of 04/30/18   Duplex Venous Lower Extremity - Bilateral CAR    Narrative · Normal bilateral lower extremity venous duplex scan.       All veins in bilateral lower extremities appear compressible and show no   obvious evidence of acute DVT.          Results for orders placed during the hospital encounter of 04/30/18   Adult Transthoracic Echo Complete W/ Cont if Necessary Per Protocol    Narrative · Mild tricuspid valve regurgitation is present.  · Calculated right ventricular systolic pressure from tricuspid   regurgitation is 30 mmHg.            Discharge Details      Hector Mayfield Jr.   Home Medication Instructions CRIS:[id number]    Printed on:05/10/18 1444   Medication Information                      aspirin  MG tablet  Take 325 mg by mouth Daily.             diclofenac (VOLTAREN) 1 % gel gel  Apply 2 g topically 4 (Four) Times a Day.             pantoprazole (PROTONIX) 40 MG EC tablet  Take 1 tablet by mouth Every Night.             polyethylene glycol (MIRALAX) pack packet  Take 17 g by mouth Daily.             sennosides-docusate sodium (SENOKOT-S) 8.6-50 MG tablet  Take 2 tablets by mouth 2 (Two) Times a Day.             tamsulosin (FLOMAX) 0.4 MG capsule 24 hr capsule  Take 1 capsule by mouth Every Night.             traMADol (ULTRAM) 50 MG tablet  Take 1 tablet by mouth Every 6 (Six) Hours As Needed for Moderate Pain  for up to 2 days.                   Discharge Disposition:  Rehab Facility or Unit (DC - External)    Discharge Diet:  Diet Instructions     Diet: Cardiac; Thin        Discharge Diet:  Cardiac    Fluid Consistency:  Thin          Discharge Activity:   Activity Instructions     Activity as Tolerated               No future appointments.    Additional Instructions for the Follow-ups that You Need to Schedule     Discharge Follow-up with PCP    As directed      Follow Up Details:  once discharged from rehab         Discharge Follow-up with Specified Provider: Dr. Noe; 3 Months    As directed      To:  Dr. Noe    Follow Up:  3 Months         Discharge Follow-up with Specified Provider: patient's primary urologist    As directed      To:  patient's primary urologist    Follow Up Details:  as needed               Time Spent on Discharge:  45 minutes    Electronically signed by ULYSSES Aparicio, 05/10/18, 12:22 PM.        Electronically signed by ULYSSES Aparicio at 5/10/2018 12:22 PM

## 2018-05-10 NOTE — DISCHARGE SUMMARY
[facility] Medicine Services  DISCHARGE SUMMARY    Patient Name: Hector Mayfield Jr.  : 1931  MRN: [id number]    Date of Admission: 2018  Date of Discharge:  5/10/2018  Primary Care Physician: Terrence Salazar MD    Consults     Date and Time Order Name Status Description    2018 1224 Inpatient Cardiology Consult Completed         Hospital Course     Presenting Problem:   Dehydration [E86.0]    Active Hospital Problems (** Indicates Principal Problem)    Diagnosis Date Noted   • **Generalized weakness [R53.1] 2018   • HTN (hypertension) [I10] 2018   • CHF (congestive heart failure) [I50.9] 2018   • Elevated troponin [R74.8] 2018   • Hyperglycemia [R73.9] 2018   • Elevated serum creatinine [R79.89] 2018   • Normocytic anemia [D64.9] 2018   • Spinal stenosis [M48.00] 2018   • Dehydration [E86.0] 2018   • Bilateral lower extremity edema [R60.0] 2018   • PVC (premature ventricular contraction) [I49.3] 2018      Resolved Hospital Problems    Diagnosis Date Noted Date Resolved   No resolved problems to display.          Hospital Course:  Hector Mafyield Jr. is a 86 y.o. male with a past medical history significant for congestive heart failure, BPH, HTN, and non-Hodgkin's lymphoma (in remission since radiation in ) who presented to [facility] with complaints of generalized weakness over one month.  The patient reported that he fell on 2018 and has had recurrent falls since.  He denies any head trauma or loss of consciousness.  He denied being evaluated for his falls prior to his arrival to the emergency department.  The patient was also complaining of some bilateral lower extremity edema.  Evaluation in the emergency department revealed a marginally elevated troponin at 0.04, , BUN/creatinine 35 and 1.9 respectively.  Imaging showed no acute changes or fractures and he had a stable  EKG.  He was admitted to Hospital medicine services for further evaluation and treatment.  He was diuresed for his congestive heart failure/edema.  He was seen in consultation by physical therapy and occupational therapy due to his generalized weakness and falls.  He did have a CT of the cervical spine that demonstrated acquired spinal stenosis and bulging at C6 and C7.  He was seen in consultation by cardiology due to an episode of A. fib with RVR on 5/4/2018.  He converted back to normal sinus rhythm with IV digoxin.  Metoprolol was added.  His chads vasc score was 2 and no anticoagulation was recommended.  He will follow-up with Dr. Noe in 3 months.  The patient did develop some urinary retention likely due to immobility and constipation.  He was initiated on Flomax and had been intermittently in and out cathetering.  Avila has since been placed.  He will be sent to rehabilitation with this Avila in place and will begin bladder training once he is more mobile.  The patient does follow with Dr. Hamilton with outpatient urology at [facility] and come follow up with him if this continues.  Patient is now medically stable and ready for a short-term stay at rehabilitation.  He has been accepted to [facility] and will be transferred there today by his family.  He will need follow-up with his primary care provider once he is discharged from rehabilitation.           Day of Discharge     HPI:   Patient resting comfortably in bed with family at bedside.  No new complaints.  Ready to go to rehab today.      Review of Systems  Gen- No fevers, chills  CV- No chest pain, palpitations  Resp- No cough, dyspnea  GI- No N/V/D, abd pain      Otherwise ROS is negative except as mentioned in the HPI.    Vital Signs:   Temp:  [97.6 °F (36.4 °C)-97.8 °F (36.6 °C)] 97.8 °F (36.6 °C)  Heart Rate:  [43-51] 43  Resp:  [16-18] 16  BP: (126-141)/(51-73) 126/51     Physical Exam:  Constitutional: No acute distress, awake,  alert  HENT: NCAT, mucous membranes moist  Respiratory: Clear to auscultation bilaterally, respiratory effort normal   Cardiovascular: RRR, no murmurs, rubs, or gallops, palpable pedal pulses bilaterally  Gastrointestinal: Positive bowel sounds, soft, nontender, nondistended  Musculoskeletal: No bilateral ankle edema  Psychiatric: Appropriate affect, cooperative  Neurologic: Oriented x 3, strength symmetric in all extremities, Cranial Nerves grossly intact to confrontation, speech clear  Skin: No rashes      Pertinent  and/or Most Recent Results       Results from last 7 days  Lab Units 05/10/18  0525 05/09/18  0530 05/05/18  0502 05/04/18  0510   WBC 10*3/mm3 7.99 7.56  --   --    HEMOGLOBIN g/dL 8.8* 8.7*  --   --    HEMATOCRIT % 28.5* 28.2*  --   --    PLATELETS 10*3/mm3 341 305  --   --    SODIUM mmol/L  --  139 142 142   POTASSIUM mmol/L  --  4.0 4.0 3.8   CHLORIDE mmol/L  --  106 111* 112*   CO2 mmol/L  --  26.0 26.0 25.0   BUN mg/dL  --  29* 20 22   CREATININE mg/dL  --  0.90 1.00 1.10   GLUCOSE mg/dL  --  85 101* 102*   CALCIUM mg/dL  --  8.2* 8.3* 8.6*           Invalid input(s): PROT, LABALBU        Invalid input(s): TG, LDLCALC, LDLREALC      Brief Urine Lab Results  (Last result in the past 365 days)      Color   Clarity   Blood   Leuk Est   Nitrite   Protein   CREAT   Urine HCG        05/01/18 0741             93.3       05/01/18 0741 Yellow Clear Negative Negative Negative Trace(A)               Microbiology Results Abnormal     None          Imaging Results (all)     Procedure Component Value Units Date/Time    US Renal Limited [[id number]] Collected:  05/02/18 1054     Updated:  05/02/18 1154    Narrative:       EXAMINATION: US RENAL LIMITED- 05/02/2018     INDICATION: E86.0-Dehydration; R79.89-Other specified abnormal findings  of blood chemistry; R53.1-Weakness; Z74.09-Other reduced mobility;  T07.XXXA-Unspecified multiple injuries, initial encounter     COMPARISON: NONE     FINDINGS:   1. The right  kidney measures 9.4 x 4.7 x 4.5 cm. There is 1.3 cm of  cortical thickness. The right kidney is smooth without solid mass.     There is, however, a large upper pole cyst projecting from the upper  aspect of the right kidney with slight shaggy contours measuring 8.2 x  5.4 x 5.6 cm. Septations are not identified and no solid component is  noted. Further, there is no obstruction to the right kidney.  2. The left kidney measures 10.4 x 5.4 x 4.4 cm. There is 1.8 cm of  cortical thickness. There is no solid mass, dominant cyst or  hydronephrosis associated with the left kidney.  3. The bladder exam is unremarkable. There is a considerable volume of  urine in the bladder.       Impression:       1. Large right upper pole renal cyst without complicating factors or  features.  2. Normal-appearing kidneys otherwise with normal size and parenchymal  complement. Renal obstruction is not identified on either side.  3. Bladder is unremarkable and mildly distended with urine.      D:  05/02/2018  E:  05/02/2018     This report was finalized on 5/2/2018 11:52 AM by Dr. Kishor West MD.       XR Tibia Fibula 2 View Left [[id number]] Collected:  04/30/18 2101     Updated:  04/30/18 2206    Narrative:       EXAM:    XR Left Tibia and Fibula, 2 Views    CLINICAL HISTORY:    86 years old, male; Pain; Lower leg; Left; Additional info: Fall, left lower   leg pain    TECHNIQUE:    Frontal and lateral views of the left tibia and fibula.    COMPARISON:    No relevant prior studies available.    FINDINGS:    Bones/joints:  Minimal DJD of knee.  Knee joint effusion.  No dislocation or   acute fracture of tibia or fibula.  Small tibial and patellar osteophyte   formation.  Minimal osteophytes at dorsal aspect of talus and navicular bones.    Soft tissues:  Soft tissue swelling.  No radiodense foreign body.    Vasculature:  Scattered atherosclerotic placques are seen along some vessels.      Impression:       1.  Minimal DJD of knee.  2.   Knee joint effusion.  3.  Soft tissue swelling.  4.  No dislocation or acute fracture of tibia or fibula.    THIS DOCUMENT HAS BEEN ELECTRONICALLY SIGNED BY PALOAM ROMAN MD    XR Knee 1 or 2 View Left [[id number]] Collected:  04/30/18 2057     Updated:  04/30/18 2200    Narrative:       EXAM:    XR Left Knee, 1 or 2 views    CLINICAL HISTORY:    86 years old, male; Pain; Knee; Left; Additional info: Left knee pain S/P fall    TECHNIQUE:    Frontal and/or lateral views of the left knee.    COMPARISON:    No relevant prior studies available.    FINDINGS:    Bones/joints:  Joint effusion.  Small osteophytes of patella and tibia.    Minimal DJD.  No dislocation or acute fracture visualized.    Soft tissues:  Soft tissue swelling.  No radiodense foreign body.    Vasculature:  Scattered atherosclerotic placques are seen along some vessels.      Impression:       1.  Soft tissue swelling.  2.  Joint effusion.  3.  Minimal DJD.  4.  No dislocation or acute fracture visualized.    THIS DOCUMENT HAS BEEN ELECTRONICALLY SIGNED BY PALOMA ROMAN MD    XR Humerus Left [[id number]] Collected:  04/30/18 2056     Updated:  04/30/18 2158    Narrative:       EXAM:    XR Left Humerus, 2 or More Views    CLINICAL HISTORY:    86 years old, male; Pain; Upper arm; Left; Additional info: Fall, left upper   arm pain    TECHNIQUE:    Frontal and lateral views of the left humerus.    COMPARISON:    No relevant prior studies available.    FINDINGS:    Bones/joints:  DJD of left glenohumeral and acromioclavicular joints.  No   dislocation.  No acute fracture visualized.  No significant elbow joint   effusion visualized.    Soft tissues:  Mild soft tissue swelling.  No radiodense foreign body.      Impression:       1.  DJD of left glenohumeral and acromioclavicular joints.  2.  Mild soft tissue swelling.  3.  No dislocation or acute fracture visualized.    THIS DOCUMENT HAS BEEN ELECTRONICALLY SIGNED BY PALOMA ROMAN MD    CT Head Without  Contrast [[id number]] Collected:  04/30/18 2016     Updated:  04/30/18 2154    Narrative:       EXAM:    CT Head Without Intravenous Contrast    CLINICAL HISTORY:    86 years old, male; Injury or trauma; Fall; Initial encounter; Blunt trauma   (contusions or hematomas); Additional info: Syncope    TECHNIQUE:    Axial computed tomography images of the head/brain without intravenous   contrast.  All CT scans at this facility use one or more dose reduction   techniques, viz.: automated exposure control; ma/kV adjustment per patient size   (including targeted exams where dose is matched to indication; i.e. head); or   iterative reconstruction technique.    Coronal and sagittal reformatted images were created and reviewed.    COMPARISON:    No relevant prior studies available.    FINDINGS:    Brain:  Mild cerebral and cerebellar atrophy.  No acute intracranial   hemorrhage.  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  No midline shift of the brain, acute mass effect, or   downward herniation.  The gray-white matter differentiation pattern is   preserved.    Ventricles:  No ventriculomegaly.    Bones/joints:  DJD of atlantoaxial joint.  No acute fracture of calvarium.    In regards to complete findings of the cervical spine, please refer to specific   CT Cervical Spine report today.    Soft tissues:  No significant soft tissue swelling of the scalp.    Vasculature:  Scattered atherosclerotic placques are seen along some vessels.    Sinuses:  No definite acute sinusitis, as visualized.    Mastoid air cells:  Unremarkable as visualized.  No mastoid effusion.    Orbits:  Visualized orbits are unremarkable.    Nasal cavity/septum:  Slight deviation of nasal septum.    Dental:  Patient is nearly edentulous.      Impression:       1.  Mild cerebral and cerebellar atrophy.  2.  No acute intracranial  hemorrhage.  3.  Microvascular ischemic disease of supratentorial brain.    THIS DOCUMENT HAS BEEN ELECTRONICALLY SIGNED BY PALOMA ROMAN MD    CT Cervical Spine Without Contrast [[id number]] Collected:  04/30/18 2059     Updated:  04/30/18 2146    Narrative:       EXAM:    CT Cervical Spine Without Intravenous Contrast    CLINICAL HISTORY:    86 years old, male; Injury or trauma; Fall; Initial encounter; Blunt trauma;   Additional info: Neck pain S/P fall    TECHNIQUE:    Axial computed tomography images of the cervical spine without intravenous   contrast.  All CT scans at this facility use one or more dose reduction   techniques, viz.: automated exposure control; ma/kV adjustment per patient size   (including targeted exams where dose is matched to indication; i.e. head); or   iterative reconstruction technique.    Coronal and sagittal reformatted images were created and reviewed.    COMPARISON:    No relevant prior studies available.    FINDINGS:    Limitations:  Motion artifact does somewhat limit the sensitivity of this   examination.    Vertebrae:  No acute fracture visualized.  Mild anterolisthesis C6-C7, C7-T1,   T1-T2, of uncertain chronicity.  DJD atlantoaxial joint.  Degenerative benign   cystic changes at odontoid.  Osteophytes are scattered along the spine.    Thickening of transverse ligament bordering the odontoid.  Multilevel facet   joint DJD.  No dislocation.    Discs/spinal canal/neural foramina:  Multilevel degenerative disc narrowing   bordered by osteophytes at C3-C4, C4-C5, C5-C6, C6-C7, C7-T1, T1-T2.    Multifactorial multilevel acquired spinal stenosis related to osteophytes and   subluxation (at some levels) causing narrowing of several lateral neural   foramina and spinal canal.  Suspect C6-C7 disc bulging.    Soft tissues:  Soft tissue swelling of the nasal turbinates, which could   relate to allergic rhinitis.  There is no significant    prevertebral/paravertebral soft tissue  swelling.    Vasculature:  Scattered atherosclerotic placques are seen along some vessels.    Sinuses:  Mild right maxillary sinusitis.    Mastoid air cells:  Visualized mastoids are clear.    Dental:  Patient is nearly edentulous.    Nasal cavity/septum:  Mild deviation of the nasal septum.    Lung apices:  Biapical scarring.  Wedge-shaped infiltrate/atelectasis or   scarring at the apex RUL.    Other findings:  Airway is satisfactory.        Impression:       1.  No acute fracture visualized.  2.  Mild anterolisthesis C6-C7, C7-T1, T1-T2, uncertain chronicity.  3.  Multilevel DDD and facet DJD.  4.  Multilevel acquired spinal stenosis.  5.  Suspect C6-C7 disc bulging.  6.  Mild right maxillary sinusitis.    THIS DOCUMENT HAS BEEN ELECTRONICALLY SIGNED BY PALOMA ROMAN MD    XR Chest 1 View [[id number]] Collected:  04/30/18 2017     Updated:  04/30/18 2119    Narrative:       EXAM:    XR Chest, 1 View    CLINICAL HISTORY:    86 years old, male; Signs and symptoms; Other: Syncope; Additional info:   Syncope, generalized weakness    TECHNIQUE:    Frontal view of the chest.    COMPARISON:    CR - XR CHEST PA AND LATERAL 2013-08-10 19:29    FINDINGS:    Lungs:  No acute infiltrates.  No pneumonia or CHF.  Old granulomatous   disease lung.    Pleural space:  No pleural effusion or pneumothorax.    Heart:  Suboptimal inspiratory effort restricts accurate heart size   evaluation, and it appears somewhat accentuated technically.  However, suspect   mild cardiomegaly.    Mediastinum:  No acute mediastinal abnormality compared to prior study.    Bones/joints:  Minimal scoliosis.  Degenerative changes of the spine.  Mild   DJD of bilateral acromioclavicular and glenohumeral joints.    Vasculature:  Mild aortic tortuosity.    Lymph nodes:  Calcified granuloma at right tracheobronchial angle, (azygos   lymph node region) from old granulomatous disease.      Impression:       1.  Suspect mild cardiomegaly.  2.  No pneumonia or  CHF.    THIS DOCUMENT HAS BEEN ELECTRONICALLY SIGNED BY PALOMA ROMAN MD          Results for orders placed during the hospital encounter of 04/30/18   Duplex Venous Lower Extremity - Bilateral CAR    Narrative · Normal bilateral lower extremity venous duplex scan.       All veins in bilateral lower extremities appear compressible and show no   obvious evidence of acute DVT.          Results for orders placed during the hospital encounter of 04/30/18   Duplex Venous Lower Extremity - Bilateral CAR    Narrative · Normal bilateral lower extremity venous duplex scan.       All veins in bilateral lower extremities appear compressible and show no   obvious evidence of acute DVT.          Results for orders placed during the hospital encounter of 04/30/18   Adult Transthoracic Echo Complete W/ Cont if Necessary Per Protocol    Narrative · Mild tricuspid valve regurgitation is present.  · Calculated right ventricular systolic pressure from tricuspid   regurgitation is 30 mmHg.            Discharge Details      Hector Mayfield Jr.   Home Medication Instructions CRIS:[id number]    Printed on:05/10/18 3618   Medication Information                      aspirin  MG tablet  Take 325 mg by mouth Daily.             diclofenac (VOLTAREN) 1 % gel gel  Apply 2 g topically 4 (Four) Times a Day.             pantoprazole (PROTONIX) 40 MG EC tablet  Take 1 tablet by mouth Every Night.             polyethylene glycol (MIRALAX) pack packet  Take 17 g by mouth Daily.             sennosides-docusate sodium (SENOKOT-S) 8.6-50 MG tablet  Take 2 tablets by mouth 2 (Two) Times a Day.             tamsulosin (FLOMAX) 0.4 MG capsule 24 hr capsule  Take 1 capsule by mouth Every Night.             traMADol (ULTRAM) 50 MG tablet  Take 1 tablet by mouth Every 6 (Six) Hours As Needed for Moderate Pain  for up to 2 days.                   Discharge Disposition:  Rehab Facility or Unit (DC - External)    Discharge Diet:  Diet Instructions      Diet: Cardiac; Thin       Discharge Diet:  Cardiac    Fluid Consistency:  Thin          Discharge Activity:   Activity Instructions     Activity as Tolerated               No future appointments.    Additional Instructions for the Follow-ups that You Need to Schedule     Discharge Follow-up with PCP    As directed      Follow Up Details:  once discharged from rehab         Discharge Follow-up with Specified Provider: Dr. Noe; 3 Months    As directed      To:  Dr. Noe    Follow Up:  3 Months         Discharge Follow-up with Specified Provider: patient's primary urologist    As directed      To:  patient's primary urologist    Follow Up Details:  as needed               Time Spent on Discharge:  45 minutes    Electronically signed by ULYSSES Aparicio, 05/10/18, 12:22 PM.

## 2018-05-10 NOTE — THERAPY TREATMENT NOTE
Acute Care - Physical Therapy Treatment Note  [facility]     Patient Name: Hector Mayfield Jr.  : 1931  MRN: [id number]  Today's Date: 5/10/2018  Onset of Illness/Injury or Date of Surgery: 18  Date of Referral to PT: 18  Referring Physician: ANGEL Hilton    Admit Date: 2018    Visit Dx:    ICD-10-CM ICD-9-CM   1. Dehydration E86.0 276.51   2. Elevated serum creatinine R79.89 790.99   3. Generalized weakness R53.1 780.79   4. Impaired mobility and activities of daily living Z74.09 799.89   5. Multiple contusions T07.XXXA 924.8   6. Impaired mobility and ADLs Z74.09 799.89   7. Impaired functional mobility, balance, gait, and endurance Z74.09 V49.89     Patient Active Problem List   Diagnosis   • HTN (hypertension)   • CHF (congestive heart failure)   • Elevated troponin   • Hyperglycemia   • Elevated serum creatinine   • Normocytic anemia   • Spinal stenosis   • Generalized weakness   • Dehydration   • Bilateral lower extremity edema   • PVC (premature ventricular contraction)       Therapy Treatment          Rehabilitation Treatment Summary     Row Name 05/10/18 08             Treatment Time/Intention    Discipline physical therapy assistant  -AS      Document Type therapy note (daily note)  -AS      Subjective Information complains of;weakness;pain  -AS      Mode of Treatment physical therapy  -AS      Patient/Family Observations family present  -AS      Patient Effort good  -AS      Comment patient reports bilateral knee pain anteriorly  -AS      Recorded by [AS] Beba Paulino, ALEXIS 05/10/18 0908 05/10/18 0908      Row Name 05/10/18 0800             Cognitive Assessment/Intervention- PT/OT    Affect/Mental Status (Cognitive) anxious  -AS      Orientation Status (Cognition) oriented x 3  -AS      Follows Commands (Cognition) follows one step commands;75-90% accuracy  -AS      Cognitive Function (Cognitive) safety deficit  -AS      Safety Deficit (Cognitive) mild deficit  -AS       Personal Safety Interventions fall prevention program maintained;gait belt;nonskid shoes/slippers when out of bed;other (see comments)   exit alarm  -AS      Recorded by [AS] Beba Paulino, hospitals 05/10/18 0908 05/10/18 0908      Row Name 05/10/18 0800             Bed Mobility Assessment/Treatment    Supine-Sit [place] (Bed Mobility) verbal cues;minimum assist (75% patient effort)  -AS      Bed Mobility, Safety Issues decreased use of arms for pushing/pulling;decreased use of legs for bridging/pushing  -AS      Assistive Device (Bed Mobility) draw sheet;head of bed elevated;bed rails  -AS      Comment (Bed Mobility) assist at trunk to scoot to EOB  -AS      Recorded by [AS] Beba Paulino, hospitals 05/10/18 0908 05/10/18 0908      Row Name 05/10/18 0800             Transfer Assessment/Treatment    Sit-Stand [place] (Transfers) verbal cues;minimum assist (75% patient effort);2 person assist  -AS      Stand-Sit [place] (Transfers) verbal cues;minimum assist (75% patient effort);2 person assist  -AS      Recorded by [AS] Beba Paulino, hospitals 05/10/18 0908 05/10/18 0908      Row Name 05/10/18 0800             Sit-Stand Transfer    Assistive Device (Sit-Stand Transfers) walker, front-wheeled  -AS      Recorded by [AS] Beba Paulino, hospitals 05/10/18 0908 05/10/18 0908      Row Name 05/10/18 0800             Stand-Sit Transfer    Assistive Device (Stand-Sit Transfers) walker, front-wheeled   verbal cues to correct posterior lean and to widen AB  -AS      Recorded by [AS] Beba Paulino, hospitals 05/10/18 0908 05/10/18 0908      Row Name 05/10/18 0800             Gait/Stairs Assessment/Training    Gait/Stairs Assessment/Training gait/ambulation assistive device  -AS      [place] Level (Gait) verbal cues;minimum assist (75% patient effort);2 person assist  -AS      Assistive Device (Gait) walker, front-wheeled  -AS      Distance in Feet (Gait) 50  -AS      Pattern (Gait) step-through  -AS       Deviations/Abnormal Patterns (Gait) base of support, narrow;mike decreased;gait speed decreased  -AS      Bilateral Gait Deviations forward flexed posture  -AS      Comment (Gait/Stairs) verbal cues for increased upright posture and to increase step length, follow with chair for safety.  -AS      Recorded by [AS] Beba Paulino, [facility] 05/10/18 0908 05/10/18 0908      Row Name 05/10/18 0800             Therapeutic Exercise    Lower Extremity Range of Motion (Therapeutic Exercise) knee flexion/extension, bilateral;ankle dorsiflexion/plantar flexion, bilateral  -AS      Exercise Type (Therapeutic Exercise) AROM (active range of motion)  -AS      Position (Therapeutic Exercise) seated;supine  -AS      Sets/Reps (Therapeutic Exercise) 1/10  -AS      Recorded by [AS] Beba Paulino, [facility] 05/10/18 0908 05/10/18 0908      Row Name 05/10/18 0800             Positioning and Restraints    Pre-Treatment Position in bed  -AS      Post Treatment Position chair  -AS      In Chair reclined;call light within reach;encouraged to call for assist;exit alarm on;with family/caregiver  -AS      Recorded by [AS] Beba Paulino, [facility] 05/10/18 0908 05/10/18 0908      Row Name 05/10/18 0800             Pain Scale: FACES Pre/Post-Treatment    Pain: FACES Scale, Pretreatment 2-->hurts little bit  -AS      Pain: FACES Scale, Post-Treatment 2-->hurts little bit  -AS      Recorded by [AS] Beba Paulino, [facility] 05/10/18 0908 05/10/18 0908        User Key  (r) = Recorded By, (t) = Taken By, (c) = Cosigned By    Initials Name Effective Dates Discipline    AS Beba Shankar [place], [facility] 06/22/15 -  PT                     Physical Therapy Education     Title: PT OT SLP Therapies (Active)     Topic: Physical Therapy (Active)     Point: Mobility training (Active)    Learning Progress Summary     Learner Status Readiness Method Response Comment Documented by    Patient Active Acceptance E NR  AS 05/10/18 0908     Active Acceptance E NR  DHAVAL  05/08/18 1601     Active Eager E,D NR  DM 05/07/18 1439     Active Eager E,D NR  DM 05/04/18 1519     Active Acceptance E,D NR  DM 05/02/18 1249    Family Active Eager E,D NR  DM 05/07/18 1439     Active Eager E,D NR  DM 05/04/18 1519    Significant Other Active Acceptance E,D NR  DM 05/02/18 1249          Point: Home exercise program (Active)    Learning Progress Summary     Learner Status Readiness Method Response Comment Documented by    Patient Active Acceptance E NR  AS 05/10/18 0908     Active Acceptance E NR  DHAVAL 05/08/18 1601     Active Eager E,D NR  DM 05/07/18 1439     Active Eager E,D NR  DM 05/04/18 1519     Active Acceptance E,D NR  DM 05/02/18 1249    Family Active Eager E,D NR  DM 05/07/18 1439     Active Eager E,D NR  DM 05/04/18 1519    Significant Other Active Acceptance E,D NR  DM 05/02/18 1249          Point: Body mechanics (Active)    Learning Progress Summary     Learner Status Readiness Method Response Comment Documented by    Patient Active Acceptance E NR  AS 05/10/18 0908     Active Acceptance E NR  DHAVAL 05/08/18 1601     Active Eager E,D NR  DM 05/07/18 1439     Active Eager E,D NR  DM 05/04/18 1519     Active Acceptance E,D NR  DM 05/02/18 1249    Family Active Eager E,D NR  DM 05/07/18 1439     Active Eager E,D NR  DM 05/04/18 1519    Significant Other Active Acceptance E,D NR  DM 05/02/18 1249          Point: Precautions (Active)    Learning Progress Summary     Learner Status Readiness Method Response Comment Documented by    Patient Active Acceptance E NR  AS 05/10/18 0908     Active Acceptance E NR  DHAVAL 05/08/18 1601     Active Eager E,D NR  DM 05/07/18 1439     Active Eager E,D NR  DM 05/04/18 1519     Active Acceptance E,D NR  DM 05/02/18 1249    Family Active Eager E,D NR  DM 05/07/18 1439     Active Eager E,D NR  DM 05/04/18 1519    Significant Other Active Acceptance E,D NR  DM 05/02/18 1249                      User Key     Initials Effective Dates Name Provider Type Discipline     DHAVAL 06/19/15 -  Adelaide Metzger, PT Physical Therapist PT    DM 06/19/15 -  Bambi Cruz, PT Physical Therapist PT    AS 06/22/15 -  Beba Paulino PTA Physical Therapy Assistant PT                    PT Recommendation and Plan     Plan of Care Reviewed With: patient  Progress: improving  Outcome Summary: patient up ambulating with assist x2 and rolling walker for support, verbal cues to improve gait ,mechanics and posture. Follow with chair for safety.          Outcome Measures     Row Name 05/10/18 0800 05/09/18 1317 05/08/18 1500       How much help from another person do you currently need...    Turning from your back to your side while in flat bed without using bedrails? 3  -AS  -- 3  -DHAVAL    Moving from lying on back to sitting on the side of a flat bed without bedrails? 3  -AS  -- 2  -DHAVAL    Moving to and from a bed to a chair (including a wheelchair)? 3  -AS  -- 3  -DHAVAL    Standing up from a chair using your arms (e.g., wheelchair, bedside chair)? 3  -AS  -- 2  -DHAVAL    Climbing 3-5 steps with a railing? 2  -AS  -- 1  -DHAVAL    To walk in hospital room? 3  -AS  -- 3  -DHAVAL    AM-PAC 6 Clicks Score 17  -AS  -- 14  -DHAVAL       How much help from another is currently needed...    Putting on and taking off regular lower body clothing?  -- 2  -KF  --    Bathing (including washing, rinsing, and drying)  -- 2  -KF  --    Toileting (which includes using toilet bed pan or urinal)  -- 1   danielson  -KF  --    Putting on and taking off regular upper body clothing  -- 3  -KF  --    Taking care of personal grooming (such as brushing teeth)  -- 3  -KF  --    Eating meals  -- 3  -KF  --    Score  -- 14  -KF  --       Functional Assessment    Outcome Measure Options AM-PAC 6 Clicks Basic Mobility (PT)  -AS AM-PAC 6 Clicks Daily Activity (OT)  -KF  --    Row Name 05/08/18 1120 05/07/18 1307 05/07/18 1110       How much help from another person do you currently need...    Turning from your back to your side while in flat bed  without using bedrails?  -- 2  -DM  --    Moving from lying on back to sitting on the side of a flat bed without bedrails?  -- 2  -DM  --    Moving to and from a bed to a chair (including a wheelchair)?  -- 2  -DM  --    Standing up from a chair using your arms (e.g., wheelchair, bedside chair)?  -- 2  -DM  --    Climbing 3-5 steps with a railing?  -- 1  -DM  --    To walk in hospital room?  -- 3  -DM  --    AM-PAC 6 Clicks Score  -- 12  -DM  --       How much help from another is currently needed...    Putting on and taking off regular lower body clothing? 2  -KF  -- 2  -KF    Bathing (including washing, rinsing, and drying) 2  -KF  -- 2  -KF    Toileting (which includes using toilet bed pan or urinal) 2  -KF  -- 2  -KF    Putting on and taking off regular upper body clothing 2  -KF  -- 2  -KF    Taking care of personal grooming (such as brushing teeth) 3  -KF  -- 3  -KF    Eating meals 3  -KF  -- 3  -KF    Score 14  -KF  -- 14  -KF       Functional Assessment    Outcome Measure Options AM-PAC 6 Clicks Daily Activity (OT)  -KF AM-PAC 6 Clicks Basic Mobility (PT)  -DM AM-PAC 6 Clicks Daily Activity (OT)  -KF      User Key  (r) = Recorded By, (t) = Taken By, (c) = Cosigned By    Initials Name Provider Type    DHAVAL Metzger, PT Physical Therapist    IAN Cruz, PT Physical Therapist    AS Beba Paulino PTA Physical Therapy Assistant    FRANCIS Coles, OT Occupational Therapist           Time Calculation:         PT Charges     Row Name 05/10/18 0910             Time Calculation    Start Time 0800  -AS      PT Received On 05/10/18  -AS      PT Goal Re-Cert Due Date 05/12/18  -AS         Time Calculation- PT    Total Timed Code Minutes- PT 30 minute(s)  -AS        User Key  (r) = Recorded By, (t) = Taken By, (c) = Cosigned By    Initials Name Provider Type    AS Beba Paulino PTA Physical Therapy Assistant          Therapy Charges for Today     Code Description Service Date Service  Provider Modifiers Qty    00888200745 HC GAIT TRAINING EA 15 MIN 5/10/2018 Beba Paulino, PTA GP 1    88523931859 HC PT THER SUPP EA 15 MIN 5/10/2018 Beba Paulino, ALEXIS GP 2    25961478712 HC PT THER PROC EA 15 MIN 5/10/2018 Beba Paulino, ALEXIS GP 1          PT G-Codes  Outcome Measure Options: AM-PAC 6 Clicks Basic Mobility (PT)    Beba Paulino PTA  5/10/2018

## 2018-05-10 NOTE — PLAN OF CARE
Problem: Patient Care Overview  Goal: Plan of Care Review  Outcome: Ongoing (interventions implemented as appropriate)   05/10/18 0957   Coping/Psychosocial   Plan of Care Reviewed With patient   Plan of Care Review   Progress improving   OTHER   Outcome Summary patient up ambulating with assist x2 and rolling walker for support, verbal cues to improve gait ,mechanics and posture. Follow with chair for safety.

## 2018-05-10 NOTE — PLAN OF CARE
Problem: Patient Care Overview  Goal: Plan of Care Review  Outcome: Outcome(s) achieved Date Met: 05/10/18   05/10/18 1217   Coping/Psychosocial   Plan of Care Reviewed With patient;spouse   Plan of Care Review   Progress improving   OTHER   Outcome Summary ambulating with two and walking, still weak, to  for short term rehab.

## 2018-05-26 PROBLEM — J18.9 PNEUMONIA OF LEFT LOWER LOBE DUE TO INFECTIOUS ORGANISM: Status: ACTIVE | Noted: 2018-01-01

## 2018-05-26 PROBLEM — A40.3 SEPSIS DUE TO STREPTOCOCCUS PNEUMONIAE (HCC): Status: ACTIVE | Noted: 2018-01-01

## 2018-05-26 PROBLEM — R06.03 RESPIRATORY DISTRESS: Status: ACTIVE | Noted: 2018-01-01

## 2018-05-27 PROBLEM — I48.91 ATRIAL FIBRILLATION WITH RVR (HCC): Status: ACTIVE | Noted: 2018-01-01

## 2018-05-27 PROBLEM — I50.31 ACUTE DIASTOLIC HEART FAILURE (HCC): Status: ACTIVE | Noted: 2018-01-01

## 2018-05-27 PROBLEM — D64.9 CHRONIC ANEMIA: Status: ACTIVE | Noted: 2018-01-01

## 2018-05-27 PROBLEM — D72.829 LEUKOCYTOSIS: Status: ACTIVE | Noted: 2018-01-01

## 2018-05-27 PROBLEM — J96.01 ACUTE HYPOXEMIC RESPIRATORY FAILURE (HCC): Status: ACTIVE | Noted: 2018-01-01

## 2018-05-27 PROBLEM — Z87.39 HISTORY OF SPINAL STENOSIS: Status: ACTIVE | Noted: 2018-01-01

## 2018-05-27 PROBLEM — R78.81 POSITIVE BLOOD CULTURE: Status: ACTIVE | Noted: 2018-01-01

## 2018-05-28 PROBLEM — J96.01 ACUTE RESPIRATORY FAILURE WITH HYPOXIA (HCC): Status: ACTIVE | Noted: 2018-01-01

## 2018-06-01 NOTE — PLAN OF CARE
Problem: Patient Care Overview  Goal: Plan of Care Review  Outcome: Ongoing (interventions implemented as appropriate)   06/01/18 0338   Coping/Psychosocial   Plan of Care Reviewed With patient   Plan of Care Review   Progress no change   OTHER   Outcome Summary pt rested well, 2 doses of prn ativan given for restlessness tolerated well AND     Goal: Individualization and Mutuality  Outcome: Ongoing (interventions implemented as appropriate)    Goal: Discharge Needs Assessment  Outcome: Ongoing (interventions implemented as appropriate)    Goal: Interprofessional Rounds/Family Conf  Outcome: Ongoing (interventions implemented as appropriate)      Problem: Fall Risk (Adult)  Goal: Absence of Fall  Outcome: Ongoing (interventions implemented as appropriate)      Problem: Dying Patient, Actively (Adult)  Goal: Comfort/Pain Control  Outcome: Ongoing (interventions implemented as appropriate)    Goal: Peace/Preservation of Dignity During the Dying Process  Outcome: Ongoing (interventions implemented as appropriate)      Problem: Skin Injury Risk (Adult)  Goal: Skin Health and Integrity  Outcome: Ongoing (interventions implemented as appropriate)

## 2018-06-01 NOTE — PROGRESS NOTES
Continued Stay Note  [facility]     Patient Name: Hector Mayfield Jr.  MRN: [id number]  Today's Date: 6/1/2018    Admit Date: 5/28/2018          Discharge Plan     Row Name 06/01/18 1312       Plan    Plan Inpatient Hospice Note    Plan Comments Chart Reviewed, Patient is resting at the time of visit.  Wife and daughter at the bedside, both hospice nurse to be extremely quiet. It is noted the patient had a rough night multiple as needed medications were used.  This time patient is not eating or arousable.  No needs were voiced by the daughter or the mother at this time.  No needs were voiced by the [facility] nurse at this time.  Patient is currently a 10% PPS requiring GIP status for continuous skilled nursing assessment and interventions including but not limited to IV/subcutaneous medication administration.                Discharge Codes    No documentation.           Marli Alvarado RN

## 2018-06-01 NOTE — PROGRESS NOTES
"Hospice Progress Note    Patient Name: Hector Mayfield Jr.   : 1931  Gender: male    Code Status: comfort measures    Date of Admission: 2018    Subjective:    86 y.o. male admitted to in Hospice on 18 for acute hypoxic respiratory failure.    Daughter and granddaughter at bedside. \"Rough night and morning\".     - PRNs: ativan x1; morphine x5    - Held: dul    - BM:     Needs are changing - pt has required more prns every day for symptom mgmt    By choice, pt has been without anything oral for ~ seven days now    Resting comfortably this afternoon    HIGH urine output the past two days.   Increasing needs.  BM two days ago      ROS:  Review of Systems   Unable to perform ROS: Patient nonverbal   Per family: much more agitation overnight/this morning. Wanting to get out of bed. Uncomfortable. Did whisper to family c/o back pain, air hunger. Not as awake today.     Reviewed current scheduled and prn medications for route, type, dose and frequency.     ipratropium-albuterol  •  LORazepam  •  Morphine **OR** Morphine  •  palliative care oral rinse  •  sodium chloride  •  sodium chloride  •  sodium chloride    Objective:   /67   Pulse 74   Temp 98.6 °F (37 °C) (Axillary)   Resp 16   Ht 188 cm (74.02\")   Wt 72.6 kg (160 lb 0.9 oz)   SpO2 96%   BMI 20.54 kg/m²    Intake & Output (last day)        0701 -  0700  0701 -  0700    P.O.      Total Intake(mL/kg)      Urine (mL/kg/hr) 1100 (0.6)     Stool      Total Output 1100      Net -1100                Lab Results (last 24 hours)     ** No results found for the last 24 hours. **        Imaging Results (last 24 hours)     ** No results found for the last 24 hours. **          PPS: Palliative Performance Scale score as of 2018, 2:08 PM is 10% based on the following measures:   Ambulation: Totally bed bound  Activity and Evidence of Disease: Unable to do any work, extensive evidence of disease  Self-Care: Total " "care  Intake:  Mouth care only  LOC: Drowsy or coma      Physical Exam  General Appearance:    resting with eyes closed - \"finally\" per family   HEENT:    DRY MM; + temporal wasting; eyes closed   Neck:   supple, trachea midline   Lungs:      respirations regular, even and unlabored    Heart:     no edema; + radial pulses   Abdomen:     Hypoactive bowel sounds x4, soft, NT, ND   Extremities:   no edema, + muscle wasting    Pulses:   Pulses palpable and equal bilaterally   Skin:   Warm, dry; Hemosiderin staining BLE. Protective boots in place (family/staff place these off and on for comfort)   Neurologic:   resting with eyes closed   Psych:   Calm, no facial grimacing         Active Problems:    Acute respiratory failure with hypoxia      Assessment/Plan:     86 y.o. male admitted to in Hospice on 5/28/18 for acute hypoxic respiratory failure.    Long detailed discussion with daughter and granddaughter today regarding symptoms at the end of life, symptom mgmt, and impending death. They ask good questions. Daughter is tearful but \"better today\" in understanding how/why pt is end of life. Discussed at length end of life signs/symptoms.     - d/c steroids (pt has been NPO for a week)    - d/t prn usage, change scheduled morphine to 2mg q4H -- may need infusion, which family is aware and understanding    Total Visit Time: 70 min  Face to Face Time: 45 min    Justification for care:  Patient meets criteria for acute in-patient care with required nursing assessment and interventions for symptoms with IV medications.      ULYSSES Ocasio  (C) [phone]  (O) [phone]  06/01/18  11:43 AM    "

## 2018-06-02 NOTE — PLAN OF CARE
Problem: Dying Patient, Actively (Adult)  Goal: Peace/Preservation of Dignity During the Dying Process  Outcome: Ongoing (interventions implemented as appropriate)   06/01/18 0338   Dying Patient, Actively (Adult)   Peace/Preservation of Dignity During the Dying Process making progress toward outcome   hospice, resting comfortably

## 2018-06-02 NOTE — PLAN OF CARE
Problem: Patient Care Overview  Goal: Plan of Care Review  Outcome: Ongoing (interventions implemented as appropriate)   06/01/18 2000 06/02/18 0332   Coping/Psychosocial   Plan of Care Reviewed With patient;spouse --    Plan of Care Review   Progress --  declining   OTHER   Outcome Summary --  Less responsive. Family at bedside. ATC meds ongoing. [place]. Family accepting of end of life issues     Goal: Discharge Needs Assessment  Outcome: Ongoing (interventions implemented as appropriate)

## 2018-06-02 NOTE — PROGRESS NOTES
"Hospice Progress Note    Date of Admission: 5/28/2018    Subjective: Patient's daughters at bedside, either side of bed. Holding patient and encouraging him to stay lying in bed.   Report that patient has had increase restlessness. Daughter reports worsening restlessness with any stimulation.  Patient has been receiving morphine 2 mg every 4 hours, cumulative dose of 12mg since 1500 yesterday.  x4 0.25mg ativan doses since yesterday.   0.5mg dose of ativan received around 1100.     Reviewed current scheduled and prn medications for route, type, dose, and frequency.    Morphine      •  bisacodyl  •  glycopyrrolate  •  ipratropium-albuterol  •  ketorolac  •  LORazepam  •  [DISCONTINUED] Morphine **OR** Morphine  •  naloxone  •  palliative care oral rinse  •  sodium chloride  •  sodium chloride  •  sodium chloride    Objective:  PPS 20%   /67   Pulse 74   Temp 98.6 °F (37 °C) (Axillary)   Resp 16   Ht 188 cm (74.02\")   Wt 72.6 kg (160 lb 0.9 oz)   SpO2 96%   BMI 20.54 kg/m²    Intake & Output (last day)       06/01 0701 - 06/02 0700 06/02 0701 - 06/03 0700    P.O. 0     Total Intake(mL/kg) 0 (0)     Urine (mL/kg/hr) 780 (0.4) 200 (0.2)    Total Output 780 200    Net -780 -200              Lab Results (last 24 hours)     ** No results found for the last 24 hours. **        Imaging Results (last 24 hours)     ** No results found for the last 24 hours. **          Physical Exam:  Gen: mild distress, lying in bed  Skin: warm, dry   Eyes: [place], conjunctiva clear and moist   HEENT: oropharynx clear, dry  Resp/thorax: even effort, slightly labored, CTA   CV: RRR   ABD: soft, bowel sounds +, nondistended  : danielson to bedside drainage with clear light oleg urine  Ext: no edema, no redness   Neuro: awake, unable to answer yes/no questions, no myoclonus       Reviewed labs and diagnostic results.   Lab Results   Component Value Date    HGBA1C 6.00 (H) 05/01/2018       Results from last 7 days  Lab Units " 05/28/18  0245   WBC 10*3/mm3 16.13*   HEMOGLOBIN g/dL 9.2*   HEMATOCRIT % 29.6*   PLATELETS 10*3/mm3 256       Results from last 7 days  Lab Units 05/28/18  0554   SODIUM mmol/L 146   POTASSIUM mmol/L 3.7   CHLORIDE mmol/L 113*   CO2 mmol/L 22.0   BUN mg/dL 28*   CREATININE mg/dL 1.50*   CALCIUM mg/dL 8.1*   GLUCOSE mg/dL 129*       Impression: 86 y.o. male with septis - LLL pneumonia, chronic diastolic heart failure    Plan:   Restlessness - will increase ativan to 0.5mg every 6 hours, will increase prn dose to 1mg.     Dyspnea - will start morphine infusion 0.5mg/hour, continue prns.     Follow up call to nurse later in the afternoon, patient has continued to be restless.   Increased morphine infusion to 1mg/hour.  Directed to pass on to next shift to use ativan 1mg prn as needed.     Plan - comfort focused plan at end of life.   Requires nursing assessment and interventions for symptom management.   Reviewed and updated with nursing.   Family in agreement with changes for symptom management.     Time: 30 minutes   > 50% time spent in counseling and education concerning current orders for symptom management with daughter and granddaughter.     Daya Palmer, APRN  [phone]  06/02/18  6:56 PM

## 2018-06-03 NOTE — PLAN OF CARE
Problem: Dying Patient, Actively (Adult)  Goal: Comfort/Pain Control  Outcome: Ongoing (interventions implemented as appropriate)

## 2018-06-03 NOTE — PLAN OF CARE
Problem: Patient Care Overview  Goal: Plan of Care Review  Outcome: Ongoing (interventions implemented as appropriate)   06/03/18 0357   Coping/Psychosocial   Plan of Care Reviewed With patient;spouse   Plan of Care Review   Progress declining   OTHER   Outcome Summary Patient appears comfortable with PCA and scheduled/prn meds. wife at bedside. pt nonresponsive. repositioning at wife's request. AND/hospice. danielson in place.      Goal: Individualization and Mutuality  Outcome: Ongoing (interventions implemented as appropriate)    Goal: Discharge Needs Assessment  Outcome: Ongoing (interventions implemented as appropriate)    Goal: Interprofessional Rounds/Family Conf  Outcome: Ongoing (interventions implemented as appropriate)      Problem: Fall Risk (Adult)  Goal: Absence of Fall  Outcome: Ongoing (interventions implemented as appropriate)      Problem: Dying Patient, Actively (Adult)  Goal: Comfort/Pain Control  Outcome: Ongoing (interventions implemented as appropriate)    Goal: Peace/Preservation of Dignity During the Dying Process  Outcome: Ongoing (interventions implemented as appropriate)      Problem: Skin Injury Risk (Adult)  Goal: Skin Health and Integrity  Outcome: Ongoing (interventions implemented as appropriate)

## 2018-06-03 NOTE — PLAN OF CARE
Problem: Dying Patient, Actively (Adult)  Goal: Peace/Preservation of Dignity During the Dying Process  Outcome: Ongoing (interventions implemented as appropriate)

## 2018-06-03 NOTE — PROGRESS NOTES
"Hospice Progress Note    Date of Admission: 5/28/2018    Subjective:  Family at bedside: daughter, granddaugther  Report that the patient is comfortable, no further restlessness.   X 3 morphine prns.     Reviewed current scheduled and prn medications for route, type, dose, and frequency.    Morphine      •  bisacodyl  •  glycopyrrolate  •  ipratropium-albuterol  •  ketorolac  •  LORazepam  •  [DISCONTINUED] Morphine **OR** Morphine  •  naloxone  •  palliative care oral rinse  •  sodium chloride  •  sodium chloride  •  sodium chloride    Objective:  PPS 10%   /67   Pulse 74   Temp 98.6 °F (37 °C) (Axillary)   Resp 16   Ht 188 cm (74.02\")   Wt 72.6 kg (160 lb 0.9 oz)   SpO2 96%   BMI 20.54 kg/m²    Intake & Output (last day)       06/03 0701 - 06/04 0700    P.O.     Total Intake(mL/kg)     Urine (mL/kg/hr)     Total Output      Net               Lab Results (last 24 hours)     ** No results found for the last 24 hours. **        Imaging Results (last 24 hours)     ** No results found for the last 24 hours. **          Physical Exam:  Gen: NAD, resting in bed  Skin: warm, dry   Eyes: [place], conjunctiva clear and moist   Resp/thorax: even shallow effort, non labored, CTA   CV: RRR   ABD: soft, non distended  : danielson to bedside drainage with clear oleg urine  Ext: BLE trace edema, no redness   Neuro: unresponsive to voice or light touch, no myoclonus     Reviewed labs and diagnostic results.   Lab Results   Component Value Date    HGBA1C 6.00 (H) 05/01/2018       Results from last 7 days  Lab Units 05/28/18  0245   WBC 10*3/mm3 16.13*   HEMOGLOBIN g/dL 9.2*   HEMATOCRIT % 29.6*   PLATELETS 10*3/mm3 256       Results from last 7 days  Lab Units 05/28/18  0554   SODIUM mmol/L 146   POTASSIUM mmol/L 3.7   CHLORIDE mmol/L 113*   CO2 mmol/L 22.0   BUN mg/dL 28*   CREATININE mg/dL 1.50*   CALCIUM mg/dL 8.1*   GLUCOSE mg/dL 129*       Impression: 86 y.o. male with sepsis - LLL pneumonia, chronic diastolic heart " failure    Plan:   Restlessness - no change in anxiolytic med regimen, ativan 0.5mg every 6 hours.     Dyspnea - continue infusion at 1mg/hour with prns.     Plan - comfort focused plan at end of life.   Requires nursing assessment and interventions for symptom management with IV/SUBCU medications.   Reviewed signs and symptoms of dying process with family at bedside.   Reviewed and updated with nursing.   Time: 30 minutes   > 50% time spent in counseling and education concerning current orders for symptom management with daughter, granddaughter, son    Daya Palmer, APRN  [phone]  06/03/18  7:22 PM

## 2018-06-03 NOTE — PROGRESS NOTES
Continued Stay Note  [facility]     Patient Name: Hector Mayfield Jr.  MRN: [id number]  Today's Date: 6/3/2018    Admit Date: 5/28/2018          Discharge Plan     Row Name 06/03/18 1607       Plan    Plan [facility] Inpatient    Plan Comments Hector Mayfield is an 86 year old WM with a hospice diagnosis of Acute Respiratory Failure with Hypoxia.  Inpatient admission is for the treatment of dyspnea and agitation.  Wife, daughter and granddaughter were at the bedside, patient is minimally responsive.  Wife reports that she is glad that he is resting now and not picking at things and trying to get up.  I encouraged her to continue mouth care, and that because he is mouth breathing, he would enjoy that.  Wife stated that she was afraid that she would choke him.  I instructed her on how to get the excess moisture from the sponge, and I administered mouth care.  Patient shut his mouth for a short time then opened.  Wife reports that she could do that.  Skilled nursing assessment necessary to monitor for nonverbal indication of dyspnea and anxiety.  He is not expected to survive this admission.              Discharge Codes    No documentation.           Letty Calix RN

## 2018-06-04 NOTE — PLAN OF CARE
Problem: Patient Care Overview  Goal: Plan of Care Review  Outcome: Ongoing (interventions implemented as appropriate)   06/03/18 0357 06/04/18 0559   Coping/Psychosocial   Plan of Care Reviewed With --  patient   Plan of Care Review   Progress --  declining   OTHER   Outcome Summary Patient appears comfortable with PCA and scheduled/prn meds. wife at bedside. pt nonresponsive. repositioning at wife's request. AND/hospice. danielson in place.  --      Goal: Individualization and Mutuality  Outcome: Ongoing (interventions implemented as appropriate)    Goal: Discharge Needs Assessment  Outcome: Ongoing (interventions implemented as appropriate)    Goal: Interprofessional Rounds/Family Conf  Outcome: Ongoing (interventions implemented as appropriate)      Problem: Fall Risk (Adult)  Goal: Absence of Fall  Outcome: Ongoing (interventions implemented as appropriate)      Problem: Dying Patient, Actively (Adult)  Goal: Comfort/Pain Control  Outcome: Ongoing (interventions implemented as appropriate)    Goal: Peace/Preservation of Dignity During the Dying Process  Outcome: Ongoing (interventions implemented as appropriate)      Problem: Skin Injury Risk (Adult)  Goal: Skin Health and Integrity  Outcome: Ongoing (interventions implemented as appropriate)

## 2018-06-04 NOTE — PLAN OF CARE
Problem: Patient Care Overview  Goal: Plan of Care Review  Outcome: Ongoing (interventions implemented as appropriate)    Goal: Individualization and Mutuality  Outcome: Ongoing (interventions implemented as appropriate)    Goal: Discharge Needs Assessment  Outcome: Ongoing (interventions implemented as appropriate)    Goal: Interprofessional Rounds/Family Conf  Outcome: Ongoing (interventions implemented as appropriate)      Problem: Fall Risk (Adult)  Goal: Absence of Fall  Outcome: Ongoing (interventions implemented as appropriate)

## 2018-06-04 NOTE — PROGRESS NOTES
"Hospice Progress Note    Date of Admission: 5/28/2018    Subjective:   Chart reviewed. No acute events or needs.    PRN- morphine x2        Reviewed current scheduled and prn medications for route, type, dose, and frequency.    Morphine      •  bisacodyl  •  glycopyrrolate  •  ipratropium-albuterol  •  ketorolac  •  LORazepam  •  [DISCONTINUED] Morphine **OR** Morphine  •  palliative care oral rinse  •  polyvinyl alcohol  •  sodium chloride  •  sodium chloride  •  sodium chloride    Objective:  PPS 10%   /67   Pulse 74   Temp 98.6 °F (37 °C) (Axillary)   Resp 16   Ht 188 cm (74.02\")   Wt 72.6 kg (160 lb 0.9 oz)   SpO2 96%   BMI 20.54 kg/m²    Intake & Output (last day)       06/03 0701 - 06/04 0700 06/04 0701 - 06/05 0700    P.O.  0    Total Intake(mL/kg)  0 (0)    Urine (mL/kg/hr) 100 (0.1)     Total Output 100      Net -100 0              Lab Results (last 24 hours)     ** No results found for the last 24 hours. **        Imaging Results (last 24 hours)     ** No results found for the last 24 hours. **          Physical Exam:  Gen: NAD, resting in bed  Skin: warm, dry, blue nail beds   Eyes: conjunctiva clear and moist   Resp/thorax: shallow effort, non labored, CTA   CV: irreg  ABD: soft, non distended  : danielson to bedside drainage with yellow urine  Ext: BLE trace edema (left greater)  Neuro: unresponsive to voice or light touch, no myoclonus     Reviewed labs and diagnostic results.       Impression: 86 y.o. male with sepsis - LLL pneumonia, chronic diastolic heart failure    Plan:   Continue current [facility] hospice care.  ATC ativan.  Morphine infusion.      Marzena Genao MD  06/04/18  11:37 AM      "

## 2018-06-04 NOTE — PROGRESS NOTES
Continued Stay Note  [facility]     Patient Name: Hector Mayfield Jr.  MRN: [id number]  Today's Date: 6/4/2018    Admit Date: 5/28/2018          Discharge Plan     Row Name 06/04/18 1639       Plan    Plan [facility] inpatient hospice    Plan Comments Chart reviewed.  Patient resting comfortably with eyes opened.  Unresponsive to voice and touch during assessment.  Wife at bedside.  Eyedrops scheduled 3 times a day.  Patient continues to require [facility] hospice care for skilled nursing assessment for nonverbal signs of pain and discomfort, interventions for symptom management of pain and anxiety was scheduled and prn IV med administration, and monitoring for effectiveness of these meds.  If hospice team can be of further assist call extension [phone].              Discharge Codes    No documentation.           Bev Healy RN

## 2018-06-05 NOTE — PROGRESS NOTES
Continued Stay Note  [facility]     Patient Name: Hector Mayfield Jr.  MRN: [id number]  Today's Date: 6/5/2018    Admit Date: 5/28/2018          Discharge Plan     Row Name 06/05/18 1412       Plan    Plan [facility] inpatient hospice    Plan Comments Chart reviewed.  Patient resting comfortably with eyes open at this time.  Eye lubricant ointment ordered twice a day.  Scheduled eyedrops discontinued.  Family at bedside.  Patient continues to require [facility] hospice care for skilled nursing assessment for nonverbal signs of pain and discomfort, interventions for symptom management of pain and anxiety with continuous morphine infusion of 1 mg per hour, scheduled and prn IV med administration, and monitoring for effectiveness of these meds.  If hospice team can be of further assist call extension [phone].              Discharge Codes    No documentation.           Bev Healy RN

## 2018-06-05 NOTE — PLAN OF CARE
Problem: Dying Patient, Actively (Adult)  Goal: Peace/Preservation of Dignity During the Dying Process  Outcome: Ongoing (interventions implemented as appropriate)   06/05/18 0416   Dying Patient, Actively (Adult)   Peace/Preservation of Dignity During the Dying Process making progress toward outcome   Hospice, resting comfortably.

## 2018-06-05 NOTE — PLAN OF CARE
Problem: Patient Care Overview  Goal: Discharge Needs Assessment  Outcome: Ongoing (interventions implemented as appropriate)   05/29/18 1634 05/29/18 1636 05/30/18 0508   Discharge Needs Assessment   Readmission Within the Last 30 Days --  --  no previous admission in last 30 days   Concerns to be Addressed --  --  adjustment to diagnosis/illness;discharge planning   Patient/Family Anticipates Transition to --  inpatient hospice --    Patient/Family Anticipated Services at Transition --  hospice care --    Transportation Anticipated --  health plan transportation --    Anticipated Changes Related to Illness --  --  inability to care for self   Current Discharge Risk --  --  terminally ill;chronically ill   Disability   Equipment Currently Used at Home none --  --      Goal: Interprofessional Rounds/Family Conf  Outcome: Ongoing (interventions implemented as appropriate)      Problem: Fall Risk (Adult)  Goal: Absence of Fall  Outcome: Ongoing (interventions implemented as appropriate)      Problem: Dying Patient, Actively (Adult)  Goal: Comfort/Pain Control  Outcome: Ongoing (interventions implemented as appropriate)    Goal: Peace/Preservation of Dignity During the Dying Process  Outcome: Ongoing (interventions implemented as appropriate)      Problem: Skin Injury Risk (Adult)  Goal: Skin Health and Integrity  Outcome: Ongoing (interventions implemented as appropriate)

## 2018-06-05 NOTE — PROGRESS NOTES
"Hospice Progress Note    Date of Admission: 5/28/2018    Subjective:    Chart reviewed.  Overnight events noted.  Meds reviewed with PRN med administration noted.    Daughter present for visit.  No changes or concerns.  UO down.       Morphine      bisacodyl  •  glycopyrrolate  •  ipratropium-albuterol  •  ketorolac  •  LORazepam  •  [DISCONTINUED] Morphine **OR** Morphine  •  palliative care oral rinse  •  polyvinyl alcohol  •  sodium chloride  •  sodium chloride  •  sodium chloride    Objective:  PPS 10%   /67   Pulse 74   Temp 98.6 °F (37 °C) (Axillary)   Resp 16   Ht 188 cm (74.02\")   Wt 72.6 kg (160 lb 0.9 oz)   SpO2 96%   BMI 20.54 kg/m²    Intake & Output (last day)       06/04 0701 - 06/05 0700 06/05 0701 - 06/06 0700    P.O. 0     Total Intake(mL/kg) 0 (0)     Urine (mL/kg/hr) 25 (0)     Total Output 25      Net -25                Lab Results (last 24 hours)     ** No results found for the last 24 hours. **        Imaging Results (last 24 hours)     ** No results found for the last 24 hours. **          Physical Exam:  Gen: NAD, resting in bed  Skin: cold feet, mottled hands  Eyes: nl lids/lashes  Resp/thorax: shallow effort, non labored, few rhonchi  CV: RRR   ABD: soft, non distended  : danielson to bedside drain  Ext: BLE trace edema  Neuro: unresponsive to voice or light touch, no myoclonus     Reviewed labs and diagnostic results.   Lab Results   Component Value Date    HGBA1C 6.00 (H) 05/01/2018       Impression: 86 y.o. male with sepsis - LLL pneumonia, chronic diastolic heart failure    Symptoms:  Restlessness -   Dyspnea -  Debility-     Plan:  Injection ativan and morphine for above.  Comfort focused plan at end of life.  Requires nursing assessment and interventions for symptom management with IV/SUBCU medications.               Marzena Genao MD  06/05/18  11:50 AM      "

## 2018-06-06 NOTE — PLAN OF CARE
Problem: Patient Care Overview  Goal: Plan of Care Review  Outcome: Ongoing (interventions implemented as appropriate)   06/06/18 0350   Coping/Psychosocial   Plan of Care Reviewed With spouse   Plan of Care Review   Progress declining   OTHER   Outcome Summary pt very comfortable, AND     Goal: Individualization and Mutuality  Outcome: Ongoing (interventions implemented as appropriate)    Goal: Discharge Needs Assessment  Outcome: Ongoing (interventions implemented as appropriate)    Goal: Interprofessional Rounds/Family Conf  Outcome: Ongoing (interventions implemented as appropriate)      Problem: Fall Risk (Adult)  Goal: Absence of Fall  Outcome: Ongoing (interventions implemented as appropriate)      Problem: Dying Patient, Actively (Adult)  Goal: Comfort/Pain Control  Outcome: Ongoing (interventions implemented as appropriate)    Goal: Peace/Preservation of Dignity During the Dying Process  Outcome: Ongoing (interventions implemented as appropriate)      Problem: Skin Injury Risk (Adult)  Goal: Skin Health and Integrity  Outcome: Ongoing (interventions implemented as appropriate)

## 2018-06-06 NOTE — PLAN OF CARE
Problem: Patient Care Overview  Goal: Plan of Care Review  Outcome: Ongoing (interventions implemented as appropriate)   06/06/18 2975   Coping/Psychosocial   Plan of Care Reviewed With patient   Plan of Care Review   Progress declining   OTHER   Outcome Summary Breathing very shallow, no s/s of pain, resting comfortably. Hospice care continuing.      Goal: Individualization and Mutuality  Outcome: Ongoing (interventions implemented as appropriate)    Goal: Discharge Needs Assessment  Outcome: Ongoing (interventions implemented as appropriate)    Goal: Interprofessional Rounds/Family Conf  Outcome: Ongoing (interventions implemented as appropriate)      Problem: Fall Risk (Adult)  Goal: Absence of Fall  Outcome: Ongoing (interventions implemented as appropriate)      Problem: Dying Patient, Actively (Adult)  Goal: Comfort/Pain Control  Outcome: Ongoing (interventions implemented as appropriate)    Goal: Peace/Preservation of Dignity During the Dying Process  Outcome: Ongoing (interventions implemented as appropriate)      Problem: Skin Injury Risk (Adult)  Goal: Skin Health and Integrity  Outcome: Ongoing (interventions implemented as appropriate)

## 2018-06-06 NOTE — PROGRESS NOTES
Continued Stay Note  [facility]     Patient Name: Hector Mayfield Jr.  MRN: [id number]  Today's Date: 6/6/2018    Admit Date: 5/28/2018          Discharge Plan     Row Name 06/06/18 1200       Plan    Plan [facility] inpatient hospice    Plan Comments Chart reviewed.  Patient resting comfortably with eyes open.  Unresponsive to voice and touch during assessment.  Feet are cold and mottled.  Fingernails on right hand are cyanotic.  Breathing is shallow and unlabored.  Assessment discussed with family.  Patient continues to require [facility]IP hospice care for skilled nursing assessment, interventions for symptom management of pain and anxiety with continuous morphine infusion IV at 1 mg an hour, scheduled and prn IV med administration, and monitoring for effectiveness of these meds.  If hospice team can be of further assist call extension [phone].              Discharge Codes    No documentation.           Bev Healy RN

## 2018-06-06 NOTE — PROGRESS NOTES
"Hospice Progress Note    Patient Name: Hector Mayfield Jr.   : 1931  Gender: male    Code Status: comfort measures    Date of Admission: 2018    Subjective:    Pt resting, appears comfortable.     Continues on Morphine PCA 1mg/hr    - PRNs: none    - Held: none    - BM:       ROS:  Review of Systems   Unable to perform ROS: Patient unresponsive       Reviewed current scheduled and prn medications for route, type, dose and frequency.    Morphine      bisacodyl  •  glycopyrrolate  •  ipratropium-albuterol  •  ketorolac  •  LORazepam  •  [DISCONTINUED] Morphine **OR** Morphine  •  palliative care oral rinse  •  polyvinyl alcohol  •  sodium chloride  •  sodium chloride  •  sodium chloride    Objective:   /67   Pulse 74   Temp 98.6 °F (37 °C) (Axillary)   Resp 16   Ht 188 cm (74.02\")   Wt 72.6 kg (160 lb 0.9 oz)   SpO2 96%   BMI 20.54 kg/m²    Intake & Output (last day)        07 -  07 07 -  0700    P.O.  0    Total Intake(mL/kg)  0 (0)    Urine (mL/kg/hr) 550 (0.3)     Total Output 550      Net -550 0              Lab Results (last 24 hours)     ** No results found for the last 24 hours. **        Imaging Results (last 24 hours)     ** No results found for the last 24 hours. **          PPS: Palliative Performance Scale score as of 2018, 3:03 PM is 10% based on the following measures:   Ambulation: Totally bed bound  Activity and Evidence of Disease: Unable to do any work, extensive evidence of disease  Self-Care: Total care  Intake:  Mouth care only  LOC: Drowsy or coma      Physical Exam  Gen: NAD, resting in bed - looks different to me today - dry, waxing appearing skin, cheekbones prominent, mottling Left knee, fingers  Skin: cool, dry   Eyes: remained closed  Resp/thorax: even shallow effort, non labored, CTA   CV: RRR, + radial pulses  ABD: soft, non distended  : danielson to bedside - output dark  Ext: no edema, + mottling    Neuro: unresponsive to voice " or light touch, no myoclonus       Active Problems:    Acute respiratory failure with hypoxia      Assessment/Plan:     Support provided to Daughter and Granddaughter. Questions answered.     - Continue current plan of care    - Monitor for increased needs    Total Visit Time: 40 min  Face to Face Time: 30 min    Justification for care:  Patient meets criteria for acute in-patient care with required nursing assessment and interventions for symptoms with IV medications.      ULYSSES Ocasio  (C) [phone]  (O) [phone]  06/06/18  1:00 PM

## 2018-06-07 NOTE — PLAN OF CARE
Problem: Patient Care Overview  Goal: Plan of Care Review  Outcome: Ongoing (interventions implemented as appropriate)   06/07/18 0408   Coping/Psychosocial   Plan of Care Reviewed With spouse   Plan of Care Review   Progress declining   OTHER   Outcome Summary comfort measures AND      Goal: Individualization and Mutuality  Outcome: Ongoing (interventions implemented as appropriate)    Goal: Discharge Needs Assessment  Outcome: Ongoing (interventions implemented as appropriate)    Goal: Interprofessional Rounds/Family Conf  Outcome: Ongoing (interventions implemented as appropriate)      Problem: Fall Risk (Adult)  Goal: Absence of Fall  Outcome: Ongoing (interventions implemented as appropriate)      Problem: Dying Patient, Actively (Adult)  Goal: Comfort/Pain Control  Outcome: Ongoing (interventions implemented as appropriate)    Goal: Peace/Preservation of Dignity During the Dying Process  Outcome: Ongoing (interventions implemented as appropriate)      Problem: Skin Injury Risk (Adult)  Goal: Skin Health and Integrity  Outcome: Ongoing (interventions implemented as appropriate)

## 2018-06-07 NOTE — PROGRESS NOTES
Continued Stay Note  [facility]     Patient Name: Hector Mayfield Jr.  MRN: [id number]  Today's Date: 6/7/2018    Admit Date: 5/28/2018          Discharge Plan     Row Name 06/07/18 1345       Plan    Plan [facility] inpatient hospice    Plan Comments Chart reviewed.  Patient resting comfortably with eyes open.  Unresponsive to voice and touch during assessment.  Right hand and bilateral feet are cool and mottled.  Fingernails are cyanotic.  Assessment discussed with family at bedside.  Patient continues to require [facility]IP hospice care for skilled nursing assessment for nonverbal signs of pain and discomfort, interventions for symptom management of pain and anxiety with continuous morphine infusion at 1 mg an hour, scheduled and prn IV med administration, and monitoring for effectiveness of these meds.  If hospice team can be of further assist call extension [phone].              Discharge Codes    No documentation.           Bev Healy RN

## 2018-06-07 NOTE — PROGRESS NOTES
"Hospice Progress Note    Patient Name: Hector Mayfield Jr.   : 1931  Gender: male    Code Status: comfort measures    Date of Admission: 2018    Subjective:    Resting comfortably - appears more dry, peaceful. Actively dying.     Continues on morphine pca    - PRNs: none    - Held: none    - BM:       ROS:  Review of Systems   Unable to perform ROS: Patient unresponsive       Reviewed current scheduled and prn medications for route, type, dose and frequency.    Morphine      bisacodyl  •  glycopyrrolate  •  ipratropium-albuterol  •  ketorolac  •  LORazepam  •  [DISCONTINUED] Morphine **OR** Morphine  •  palliative care oral rinse  •  polyvinyl alcohol  •  sodium chloride  •  sodium chloride  •  sodium chloride    Objective:   /67   Pulse 74   Temp 98.6 °F (37 °C) (Axillary)   Resp 16   Ht 188 cm (74.02\")   Wt 72.6 kg (160 lb 0.9 oz)   SpO2 96%   BMI 20.54 kg/m²    Intake & Output (last day)        07 -  0700  07 -  0700    P.O. 0     Total Intake(mL/kg) 0 (0)     Urine (mL/kg/hr) 75 (0) 425 (1.9)    Total Output 75 425    Net -75 -425              Lab Results (last 24 hours)     ** No results found for the last 24 hours. **        Imaging Results (last 24 hours)     ** No results found for the last 24 hours. **          PPS: Palliative Performance Scale score as of 2018, 10:49 PM is 10% based on the following measures:   Ambulation: Totally bed bound  Activity and Evidence of Disease: Unable to do any work, extensive evidence of disease  Self-Care: Total care  Intake:  Mouth care only  LOC: Drowsy or coma      Physical Exam  Gen: NAD, resting in bed - appears much like yesterday: dry, waxing appearing skin, cheekbones prominent, mottling Left knee, fingers  Skin: cool, dry   Eyes: remained closed  Resp/thorax: even shallow effort, non labored, CTA   CV: RRR, radial pulses not palpable  ABD: soft, non distended  : danielson to bedside - output dark  Ext: no " edema, + mottling    Neuro: unresponsive to voice or light touch, no myoclonus       Active Problems:    Acute respiratory failure with hypoxia      Assessment/Plan:     Continue current plan of care - family aware that pt's death is imminent - expecting this for the past week - continue to support the family    Monitor for increased needs    Total Visit Time: 15 min  Face to Face Time: 5 min    Justification for care:  Patient meets criteria for acute in-patient care with required nursing assessment and interventions for symptoms with IV medications.      Cheryl Darling, APRN  (C) [phone]  (O) [phone]  06/07/18  10:05 AM

## 2018-06-08 NOTE — PLAN OF CARE
Problem: Patient Care Overview  Goal: Plan of Care Review  Outcome: Ongoing (interventions implemented as appropriate)   06/08/18 1850   Coping/Psychosocial   Plan of Care Reviewed With patient;spouse;daughter   OTHER   Outcome Summary Family has remained at bedside today; continues to cope well. Pt with some SOA and tachypnea responded well with prn medication.Pt did tolerate a bath today. Avila patent /intact. Morphine drip continues.       Problem: Fall Risk (Adult)  Goal: Absence of Fall  Outcome: Ongoing (interventions implemented as appropriate)   06/08/18 1850   Fall Risk (Adult)   Absence of Fall making progress toward outcome       Problem: Dying Patient, Actively (Adult)  Goal: Comfort/Pain Control  Outcome: Ongoing (interventions implemented as appropriate)   06/08/18 1850   Dying Patient, Actively (Adult)   Comfort/Pain Control making progress toward outcome       Problem: Skin Injury Risk (Adult)  Goal: Skin Health and Integrity  Outcome: Ongoing (interventions implemented as appropriate)   06/08/18 1850   Skin Injury Risk (Adult)   Skin Health and Integrity making progress toward outcome

## 2018-06-08 NOTE — PROGRESS NOTES
"Hospice Progress Note    Date of Admission: 5/28/2018    Subjective:    Daughter present.  NO new concerns.  Last BM 5/30.  UO down.    PRN Maik x1, morph x1       Reviewed current scheduled and prn medications for route, type, dose, and frequency.    Objective:  PPS 10%   /67   Pulse 74   Temp 98.6 °F (37 °C) (Axillary)   Resp 16   Ht 188 cm (74.02\")   Wt 72.6 kg (160 lb 0.9 oz)   SpO2 96%   BMI 20.54 kg/m²    Intake & Output (last day)       06/07 0701 - 06/08 0700 06/08 0701 - 06/09 0700    P.O.      I.V. (mL/kg) 14.4 (0.2) 4.1 (0.1)    Total Intake(mL/kg) 14.4 (0.2) 4.1 (0.1)    Urine (mL/kg/hr) 625 (0.4)     Total Output 625      Net -610.6 +4.1              Lab Results (last 24 hours)     ** No results found for the last 24 hours. **        Imaging Results (last 24 hours)     ** No results found for the last 24 hours. **          Physical Exam:  Gen: NAD, resting in bed  Skin: right hand blue, feet cool   Eyes: normal lids/lashes  Resp/thorax: shallow, tachypneic  CV: faint, RRR   ABD: soft, non distended  : danielson to bedside drainage  Ext: BLE trace edema, no redness   Neuro: unresponsive to voice or light touch, no myoclonus       Impression: 86 y.o. male with sepsis - LLL pneumonia, chronic diastolic heart failure    Symptoms:  Restlessness -   Dyspnea -  Debility-    Plan:  ativan 0.5mg every 6 hours with prns.   Morphine infusion at 1mg/hour with prns.   Comfort focused plan at end of life.     Marzena Genao MD  06/08/18  5:30 PM      "

## 2018-06-08 NOTE — PROGRESS NOTES
Continued Stay Note  [facility]     Patient Name: Hector Mayfield Jr.  MRN: [id number]  Today's Date: 6/8/2018    Admit Date: 5/28/2018          Discharge Plan     Row Name 06/08/18 1145       Plan    Plan [facility] inpatient hospice    Plan Comments Chart reviewed. Patient resting comfortably with eyes open and family at bedside. Unresponsive to touch and voice during assessment. Feet cold and mottled. Finger tips and nail beds on right hand cold and cyanotic. Breathing is shallow and unlabored. Patient continues to require [facility]IP hospice care for symptom managment of pain and anxiety. Patient is receiving Morphine infusion 1mg/hr scheduled and PRN IV medication and monitoring for effectiveness of medication. If hospice team can be further assistance call ext [phone]              Discharge Codes    No documentation.           Joaquin Nelson RN

## 2018-06-08 NOTE — PLAN OF CARE
Problem: Patient Care Overview  Goal: Plan of Care Review  Outcome: Ongoing (interventions implemented as appropriate)   06/08/18 5914   Coping/Psychosocial   Plan of Care Reviewed With spouse   OTHER   Outcome Summary Spouse at bedside. Coping well. Patient remains comfortable, no restlessness noted throughout shift; no PRN medication needed as of yet. Wife requesting that patient does not be turned. WIfe requesting no bath this evening. Morphine drip continues. Avila intact. Patient appears very comfortable.      Goal: Individualization and Mutuality  Outcome: Ongoing (interventions implemented as appropriate)    Goal: Discharge Needs Assessment  Outcome: Ongoing (interventions implemented as appropriate)    Goal: Interprofessional Rounds/Family Conf  Outcome: Ongoing (interventions implemented as appropriate)      Problem: Fall Risk (Adult)  Goal: Absence of Fall  Outcome: Ongoing (interventions implemented as appropriate)      Problem: Dying Patient, Actively (Adult)  Goal: Comfort/Pain Control  Outcome: Ongoing (interventions implemented as appropriate)    Goal: Peace/Preservation of Dignity During the Dying Process  Outcome: Ongoing (interventions implemented as appropriate)      Problem: Skin Injury Risk (Adult)  Goal: Skin Health and Integrity  Outcome: Ongoing (interventions implemented as appropriate)

## 2018-06-09 NOTE — PLAN OF CARE
Problem: Patient Care Overview  Goal: Plan of Care Review  Outcome: Ongoing (interventions implemented as appropriate)   06/09/18 0706   Coping/Psychosocial   Plan of Care Reviewed With spouse   Plan of Care Review   Progress declining   OTHER   Outcome Summary Patient comfort AND hospice. Remains comfortable with no episodes of restless. No prns needed. Spouse remains at bedside and continues to cope well. Avila remains and Morphine drip continues.      Goal: Individualization and Mutuality  Outcome: Ongoing (interventions implemented as appropriate)    Goal: Discharge Needs Assessment  Outcome: Ongoing (interventions implemented as appropriate)    Goal: Interprofessional Rounds/Family Conf  Outcome: Ongoing (interventions implemented as appropriate)      Problem: Fall Risk (Adult)  Goal: Absence of Fall  Outcome: Ongoing (interventions implemented as appropriate)      Problem: Dying Patient, Actively (Adult)  Goal: Comfort/Pain Control  Outcome: Ongoing (interventions implemented as appropriate)    Goal: Peace/Preservation of Dignity During the Dying Process  Outcome: Ongoing (interventions implemented as appropriate)      Problem: Skin Injury Risk (Adult)  Goal: Skin Health and Integrity  Outcome: Ongoing (interventions implemented as appropriate)

## 2018-06-09 NOTE — SIGNIFICANT NOTE
Exam confirms with auscultation zero audible heart tones and zero audible respirations. Mr.Robert CHARLEY Mayfield Jr. was pronounced dead at 0740. Family at bedside.  MD notified by Patient's RN.    Divya Arnold RN  Clinical House Supervisor  6/9/2018 8:03 AM

## 2018-06-09 NOTE — DISCHARGE SUMMARY
Date of Death: 6/9/18  Time of Death: 0740    Presenting Problem/History of Present Illness  Active Problems:    Acute respiratory failure with hypoxia        Hospital Course    Hector Mayfield Jr. is a 86 y.o. male admitted on May 28, 2018 with hospice diagnosis of acute hypoxic respiratory failure.  Patient also with past medical history significant for congestive heart failure, hypertension, and non-Hodgkin's lymphoma in remission since 2007 status post radiation therapy. Patient had recent hospitalization April 30 through May 10 related to bilateral lower extremity edema, CHF, elevated serum creatinine and elevated troponin.  Patient was subsequently discharged to rehabilitation and then [facility]. He initially presented to [facility] emergency department from [facility] on May 26, 2018 related to progressive shortness of breath, cough and respiratory distress.  Wife verbalizes that patient has had progressive decline over the past several weeks with increased falls and weakness.  Workup this admission revealed acute hypoxic respiratory failure requiring high flow O2, aspiration pneumonia, acute on chronic diastolic heart failure, A. fib with RVR, and dysphasia with severe aspiration noted with fees study.  Patient continued to decline despite treatment and patient and family elected to pursue a more comfort focused plan of care.  Patient was admitted to inpatient hospice for symptom management of dyspnea, right lower extremity pain and anxiety.     Social History:  Social History   Substance Use Topics   • Smoking status: Never Smoker   • Smokeless tobacco: Never Used   • Alcohol use No         Consults:   Consults     Date and Time Order Name Status Description    5/28/2018 0030 Inpatient Palliative Care MD Consult Completed     5/27/2018 0030 Inpatient Cardiology Consult Completed     5/4/2018 1224 Inpatient Cardiology Consult Completed         Exam confirms with  auscultation zero audible heart tones and zero audible respirations. Mr.Robert CHARLEY Mayfield Jr. was pronounced dead at 0740. Family at bedside.  MD notified by Patient's RN.     Divya Arnold RN  Clinical House Supervisor  6/9/2018 8:03 AM      Spent some time with family at bedside for bereavement support. Wife, granddaughter present at bedside. Went back to see daughter after she arrived.     ULYSSES Ocasio  06/09/18  10:31 AM